# Patient Record
Sex: MALE | Race: WHITE | URBAN - METROPOLITAN AREA
[De-identification: names, ages, dates, MRNs, and addresses within clinical notes are randomized per-mention and may not be internally consistent; named-entity substitution may affect disease eponyms.]

---
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
## 2020-01-17 ENCOUNTER — EMERGENCY (EMERGENCY)
Facility: HOSPITAL | Age: 20
LOS: 0 days | Discharge: HOME | End: 2020-01-17
Attending: EMERGENCY MEDICINE | Admitting: EMERGENCY MEDICINE
Payer: COMMERCIAL

## 2020-01-17 VITALS
SYSTOLIC BLOOD PRESSURE: 144 MMHG | WEIGHT: 285.06 LBS | HEIGHT: 78 IN | DIASTOLIC BLOOD PRESSURE: 87 MMHG | HEART RATE: 75 BPM | OXYGEN SATURATION: 96 % | TEMPERATURE: 98 F | RESPIRATION RATE: 18 BRPM

## 2020-01-17 DIAGNOSIS — N50.811 RIGHT TESTICULAR PAIN: ICD-10-CM

## 2020-01-17 DIAGNOSIS — N34.2 OTHER URETHRITIS: ICD-10-CM

## 2020-01-17 DIAGNOSIS — N39.0 URINARY TRACT INFECTION, SITE NOT SPECIFIED: ICD-10-CM

## 2020-01-17 DIAGNOSIS — Z98.890 OTHER SPECIFIED POSTPROCEDURAL STATES: Chronic | ICD-10-CM

## 2020-01-17 DIAGNOSIS — N50.819 TESTICULAR PAIN, UNSPECIFIED: ICD-10-CM

## 2020-01-17 DIAGNOSIS — Z90.89 ACQUIRED ABSENCE OF OTHER ORGANS: Chronic | ICD-10-CM

## 2020-01-17 LAB
APPEARANCE UR: CLEAR — SIGNIFICANT CHANGE UP
BACTERIA # UR AUTO: ABNORMAL
BILIRUB UR-MCNC: NEGATIVE — SIGNIFICANT CHANGE UP
COD CRY URNS QL: NEGATIVE — SIGNIFICANT CHANGE UP
COLOR SPEC: YELLOW — SIGNIFICANT CHANGE UP
DIFF PNL FLD: NEGATIVE — SIGNIFICANT CHANGE UP
EPI CELLS # UR: ABNORMAL /HPF
GLUCOSE UR QL: NEGATIVE MG/DL — SIGNIFICANT CHANGE UP
GRAN CASTS # UR COMP ASSIST: NEGATIVE — SIGNIFICANT CHANGE UP
HYALINE CASTS # UR AUTO: NEGATIVE — SIGNIFICANT CHANGE UP
KETONES UR-MCNC: NEGATIVE — SIGNIFICANT CHANGE UP
LEUKOCYTE ESTERASE UR-ACNC: ABNORMAL
NITRITE UR-MCNC: NEGATIVE — SIGNIFICANT CHANGE UP
PH UR: 7 — SIGNIFICANT CHANGE UP (ref 5–8)
PROT UR-MCNC: NEGATIVE MG/DL — SIGNIFICANT CHANGE UP
RBC CASTS # UR COMP ASSIST: ABNORMAL /HPF
SP GR SPEC: 1.02 — SIGNIFICANT CHANGE UP (ref 1.01–1.03)
TRI-PHOS CRY UR QL COMP ASSIST: NEGATIVE — SIGNIFICANT CHANGE UP
URATE CRY FLD QL MICRO: NEGATIVE — SIGNIFICANT CHANGE UP
UROBILINOGEN FLD QL: 0.2 MG/DL — SIGNIFICANT CHANGE UP (ref 0.2–0.2)
WBC UR QL: ABNORMAL /HPF

## 2020-01-17 PROCEDURE — 99284 EMERGENCY DEPT VISIT MOD MDM: CPT

## 2020-01-17 PROCEDURE — 76870 US EXAM SCROTUM: CPT | Mod: 26

## 2020-01-17 RX ORDER — AZITHROMYCIN 500 MG/1
1000 TABLET, FILM COATED ORAL ONCE
Refills: 0 | Status: COMPLETED | OUTPATIENT
Start: 2020-01-17 | End: 2020-01-17

## 2020-01-17 RX ORDER — CEFTRIAXONE 500 MG/1
250 INJECTION, POWDER, FOR SOLUTION INTRAMUSCULAR; INTRAVENOUS ONCE
Refills: 0 | Status: COMPLETED | OUTPATIENT
Start: 2020-01-17 | End: 2020-01-17

## 2020-01-17 RX ORDER — NITROFURANTOIN MACROCRYSTAL 50 MG
1 CAPSULE ORAL
Qty: 10 | Refills: 0
Start: 2020-01-17 | End: 2020-01-21

## 2020-01-17 RX ADMIN — AZITHROMYCIN 1000 MILLIGRAM(S): 500 TABLET, FILM COATED ORAL at 21:29

## 2020-01-17 RX ADMIN — CEFTRIAXONE 250 MILLIGRAM(S): 500 INJECTION, POWDER, FOR SOLUTION INTRAMUSCULAR; INTRAVENOUS at 21:30

## 2020-01-17 NOTE — ED PROVIDER NOTE - PATIENT PORTAL LINK FT
You can access the FollowMyHealth Patient Portal offered by [facility] by registering at the following website: [email or web address]. By joining [employer]’s FollowMyHealth portal, you will also be able to view your health information using other applications (apps) compatible with our system.

## 2020-01-17 NOTE — ED PROVIDER NOTE - NS ED ROS FT
Constitutional: (-) fever  Eyes/ENT: (-) blurry vision, (-) epistaxis  Cardiovascular: (-) chest pain, (-) syncope  Respiratory: (-) cough, (-) shortness of breath  Gastrointestinal: (-) vomiting, (-) diarrhea  : R testicle pain (see HPI)  Musculoskeletal: (-) neck pain, (-) back pain, (-) joint pain  Integumentary: (-) rash, (-) edema  Neurological: (-) headache, (-) altered mental status  Psychiatric: (-) hallucinations  Allergic/Immunologic: (-) pruritus

## 2020-01-17 NOTE — ED PROVIDER NOTE - PHYSICAL EXAMINATION
Physical Exam    Vital Signs: I have reviewed the initial vital signs.  Constitutional: well-nourished, appears stated age, no acute distress  Eyes: Conjunctiva pink, Sclera clear, PERRLA, EOMI.  Gastrointestinal: soft, non-tender abdomen, no pulsatile mass, normal bowl sounds  : Minimal TTP over epididymis with no swelling, erythema or bruising. Pos cremasteric reflex.   Musculoskeletal: supple neck, no lower extremity edema, no midline tenderness  Integumentary: warm, dry, no rash  Neurologic: awake, alert, cranial nerves II-XII grossly intact, extremities’ motor and sensory functions grossly intact  Psychiatric: appropriate mood, appropriate affect

## 2020-01-17 NOTE — ED PROVIDER NOTE - CLINICAL SUMMARY MEDICAL DECISION MAKING FREE TEXT BOX
US unremarkable. UA is equivocal with WBC but no bacteria. Given this and isolated epidymal tenderness patient treated empirically for urethritis, GC/chlamydia sent, patient also to be treated for UTI.    Will follow up with urology.

## 2020-01-17 NOTE — ED ADULT TRIAGE NOTE - CHIEF COMPLAINT QUOTE
pt c/o pain to right testicle, started a few hours pta, denies pain with urination, denies swelling.

## 2020-01-17 NOTE — ED PROVIDER NOTE - ATTENDING CONTRIBUTION TO CARE
Healthy 18 yo M, here for assessment of 10 minute episode of R testicular pain which has resolved. Pain sharp, localized to posterior aspect of R testicle while urinating. No dysuria, hematuria, fever, chills, flank pain, abdominal pain, nausea, vomiting.     Denies penile discharge, sexually active with females, uses protection.     VS normal,    On exam has mild tenderness to R epidymis, normal appearing testicles, no scrotal swelling. Non tender abdomen, clear lungs, RRR.    Low suspicion for torsion, more suggestive of epidymitis. Will get UA, US and reassess

## 2020-01-17 NOTE — ED PROVIDER NOTE - NSFOLLOWUPINSTRUCTIONS_ED_ALL_ED_FT
Follow up with your primary medical doctor in 1-2 days as well as with the urologist that will be provided to you upon discharge     Hydrocele, Adult    A hydrocele is a collection of fluid in the loose pouch of skin that holds the testicles (scrotum). Usually, it affects only one testicle.     CAUSES  This condition may be caused by:    An injury to the scrotum.  An infection.  A tumor or cancer of the testicle.  Twisting of a testicle.  Decreased blood flow to the scrotum.    SYMPTOMS  A hydrocele feels like a water-filled balloon. It may also feel heavy. A hydrocele can cause:    Swelling of the scrotum. The swelling may decrease when you lie down.  Swelling of the groin.  Mild discomfort in the scrotum.  Pain. This can develop if the hydrocele was caused by infection or twisting.    DIAGNOSIS  This condition may be diagnosed with a medical history, physical exam, and imaging tests. You may also have blood and urine tests to check for infection.    TREATMENT  Treatment may include:    Watching and waiting, particularly if the hydrocele causes no symptoms.  Treatment of the underlying condition. This may include using antibiotic medicine.  Surgery to drain the fluid. Some surgical options include:  Needle aspiration. For this procedure, a needle is used to drain fluid.  Hydrocelectomy. For this procedure, an incision is made in the scrotum to remove the fluid sac.    HOME CARE INSTRUCTIONS  Keep all follow-up visits as told by your health care provider. This is important.  Watch the hydrocele for any changes.  Take over-the-counter and prescription medicines only as told by your health care provider.  If you were prescribed an antibiotic medicine, use it as told by your health care provider. Do not stop using the antibiotic even if your condition improves.    SEEK MEDICAL CARE IF:  The swelling in your scrotum or groin gets worse.  The hydrocele becomes red, firm, tender to the touch, or painful.  You notice any changes in the hydrocele.  You have a fever.    ADDITIONAL NOTES AND INSTRUCTIONS    Please follow up with your Primary MD in 24-48 hr.  Seek immediate medical care for any new/worsening signs or symptoms. Follow up with your primary medical doctor in 1-2 days as well as with the urologist that will be provided to you upon discharge       Testicle Pain    WHAT YOU NEED TO KNOW:    Testicle pain may start in your scrotum and spread to your abdomen. You may have sharp, sudden pain or dull pain that happens over time. Your testicle pain may come and go, or it may last for a long time. The cause of your pain may be unknown. Testicle pain can be caused by infection, trauma, hernia, kidney stones, or sexually transmitted infections (STIs). You may have a painful lump in your scrotum. The lump may be caused by an enlarged vein or fluid that collects around one of your testicles. This lump also may be caused by a more serious medical condition. Part of your testicle may twist. This is a serious condition that needs treatment as soon as possible.    DISCHARGE INSTRUCTIONS:    Medicines:     Antibiotics: This medicine helps fight or prevent infection. Take your antibiotics until they are gone, even if you feel better.      Pain medicine: You may be given a prescription medicine to decrease pain. Do not wait until the pain is severe before you take this medicine.      NSAIDs: These medicines decrease swelling, pain, and fever. NSAIDs are available without a doctor's order. Ask your healthcare provider which medicine is right for you. Ask how much to take and when to take it. Take as directed. NSAIDs can cause stomach bleeding and kidney problems if not taken correctly.      Take your medicine as directed. Contact your healthcare provider if you think your medicine is not helping or if you have side effects. Tell him or her if you are allergic to any medicine. Keep a list of the medicines, vitamins, and herbs you take. Include the amounts, and when and why you take them. Bring the list or the pill bottles to follow-up visits. Carry your medicine list with you in case of an emergency.    Decrease discomfort: With treatment, your pain may improve within 1 to 3 days. Depending on the cause of your testicle pain, your condition may take up to 4 weeks to heal.     Rest: Limit your activity until your pain decreases. Get more rest while you heal. Do not sit for long periods of time.       Cold packs: Place cold packs on your testicles to help ease your pain. Use cold packs as directed.      Elevation: Gently tuck a folded towel under your testicles to lift them as you sit in a chair or lie in bed. This will help ease your pain and decrease swelling.    Follow up with your healthcare provider or urologist in 3 to 7 days: Write down your questions so you remember to ask them during your visits.    Sexual activity: Avoid sexual activity until you have finished your antibiotics or until your healthcare provider tells you it is safe to have sex. Use condoms to lower your risk of STIs.    Contact your healthcare provider or urologist if:     You feel that your medicine or treatment is not working.      You feel more pain, tenderness, or swelling than before.      You have nausea or a low fever.      You have questions or concerns about your condition or care.    Return to the emergency department if:     You have sudden or severe pain in your testicles or abdomen.      You have pain in both testicles.      You are vomiting.      You have a high fever.      Your pain increases when you elevate your testicles.      Your scrotum turns blue. This could mean your testicle is not getting the blood flow it needs. Follow up with your primary medical doctor in 1-2 days as well as with the urologist that will be provided to you upon discharge       Testicle Pain    WHAT YOU NEED TO KNOW:    Testicle pain may start in your scrotum and spread to your abdomen. You may have sharp, sudden pain or dull pain that happens over time. Your testicle pain may come and go, or it may last for a long time. The cause of your pain may be unknown. Testicle pain can be caused by infection, trauma, hernia, kidney stones, or sexually transmitted infections (STIs). You may have a painful lump in your scrotum. The lump may be caused by an enlarged vein or fluid that collects around one of your testicles. This lump also may be caused by a more serious medical condition. Part of your testicle may twist. This is a serious condition that needs treatment as soon as possible.    Urinary Tract Infection    A urinary tract infection (UTI) is an infection of any part of the urinary tract, which includes the kidneys, ureters, bladder, and urethra. Risk factors include ignoring your need to urinate, wiping back to front if female, being an uncircumcised male, and having diabetes or a weak immune system. Symptoms include frequent urination, pain or burning with urination, foul smelling urine, cloudy urine, pain in the lower abdomen, blood in the urine, and fever. If you were prescribed an antibiotic medicine, take it as told by your health care provider. Do not stop taking the antibiotic even if you start to feel better.    SEEK IMMEDIATE MEDICAL CARE IF YOU HAVE THE FOLLOWING SYMPTOMS: severe back or abdominal pain, inability to keep fluids or medicine down, dizziness/lightheadedness, or a change in mental status.          DISCHARGE INSTRUCTIONS:    Medicines:     Antibiotics: This medicine helps fight or prevent infection. Take your antibiotics until they are gone, even if you feel better.      Pain medicine: You may be given a prescription medicine to decrease pain. Do not wait until the pain is severe before you take this medicine.      NSAIDs: These medicines decrease swelling, pain, and fever. NSAIDs are available without a doctor's order. Ask your healthcare provider which medicine is right for you. Ask how much to take and when to take it. Take as directed. NSAIDs can cause stomach bleeding and kidney problems if not taken correctly.      Take your medicine as directed. Contact your healthcare provider if you think your medicine is not helping or if you have side effects. Tell him or her if you are allergic to any medicine. Keep a list of the medicines, vitamins, and herbs you take. Include the amounts, and when and why you take them. Bring the list or the pill bottles to follow-up visits. Carry your medicine list with you in case of an emergency.    Decrease discomfort: With treatment, your pain may improve within 1 to 3 days. Depending on the cause of your testicle pain, your condition may take up to 4 weeks to heal.     Rest: Limit your activity until your pain decreases. Get more rest while you heal. Do not sit for long periods of time.       Cold packs: Place cold packs on your testicles to help ease your pain. Use cold packs as directed.      Elevation: Gently tuck a folded towel under your testicles to lift them as you sit in a chair or lie in bed. This will help ease your pain and decrease swelling.    Follow up with your healthcare provider or urologist in 3 to 7 days: Write down your questions so you remember to ask them during your visits.    Sexual activity: Avoid sexual activity until you have finished your antibiotics or until your healthcare provider tells you it is safe to have sex. Use condoms to lower your risk of STIs.    Contact your healthcare provider or urologist if:     You feel that your medicine or treatment is not working.      You feel more pain, tenderness, or swelling than before.      You have nausea or a low fever.      You have questions or concerns about your condition or care.    Return to the emergency department if:     You have sudden or severe pain in your testicles or abdomen.      You have pain in both testicles.      You are vomiting.      You have a high fever.      Your pain increases when you elevate your testicles.      Your scrotum turns blue. This could mean your testicle is not getting the blood flow it needs.

## 2020-01-17 NOTE — ED PROVIDER NOTE - CARE PROVIDER_API CALL
Jayant Wallace)  Urology  [address] [place] [address], [address]  [place] [address]  Phone: [phone]  Fax: [phone]  Follow Up Time: 1-3 Days

## 2020-01-17 NOTE — ED PROVIDER NOTE - CARE PLAN
Principal Discharge DX:	Hydrocele, bilateral Principal Discharge DX:	Testicular pain, right Principal Discharge DX:	Testicular pain, right  Secondary Diagnosis:	Urethritis  Secondary Diagnosis:	UTI (urinary tract infection)

## 2020-01-17 NOTE — ED PROVIDER NOTE - OBJECTIVE STATEMENT
Pt is a 19 year old male with no PMH presents to ED with testicular pain. Pt states approx.. 3 hours prior to arrival in ED developed pain to R testicle, first noticed while urinating. Pt states pain was originally aching, moderate in intensity, non radiating with no alleviating or aggravating factors. Pt states pain has since subsided and no just has a minimal aching sensation. Pt denies fever, chills. Denies abdominal pain, NVCD, dysuria, hematuria or clouding of urine.

## 2020-01-18 LAB
CULTURE RESULTS: SIGNIFICANT CHANGE UP
SPECIMEN SOURCE: SIGNIFICANT CHANGE UP

## 2020-01-20 LAB
C TRACH RRNA SPEC QL NAA+PROBE: SIGNIFICANT CHANGE UP
N GONORRHOEA RRNA SPEC QL NAA+PROBE: SIGNIFICANT CHANGE UP
SPECIMEN SOURCE: SIGNIFICANT CHANGE UP

## 2020-02-24 ENCOUNTER — EMERGENCY (EMERGENCY)
Facility: HOSPITAL | Age: 20
LOS: 0 days | Discharge: HOME | End: 2020-02-25
Attending: EMERGENCY MEDICINE | Admitting: EMERGENCY MEDICINE
Payer: COMMERCIAL

## 2020-02-24 DIAGNOSIS — Z98.890 OTHER SPECIFIED POSTPROCEDURAL STATES: Chronic | ICD-10-CM

## 2020-02-24 DIAGNOSIS — Z90.89 ACQUIRED ABSENCE OF OTHER ORGANS: Chronic | ICD-10-CM
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
## 2020-02-24 DIAGNOSIS — S09.90XA UNSPECIFIED INJURY OF HEAD, INITIAL ENCOUNTER: ICD-10-CM
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
## 2020-02-24 DIAGNOSIS — S06.0X0A CONCUSSION WITHOUT LOSS OF CONSCIOUSNESS, INITIAL ENCOUNTER: ICD-10-CM

## 2020-02-24 PROCEDURE — 99284 EMERGENCY DEPT VISIT MOD MDM: CPT

## 2020-02-25 VITALS
TEMPERATURE: 97 F | OXYGEN SATURATION: 98 % | WEIGHT: 288.81 LBS | HEART RATE: 76 BPM | RESPIRATION RATE: 17 BRPM | SYSTOLIC BLOOD PRESSURE: 130 MMHG | DIASTOLIC BLOOD PRESSURE: 68 MMHG

## 2020-02-25 RX ORDER — IBUPROFEN 200 MG
600 TABLET ORAL ONCE
Refills: 0 | Status: COMPLETED | OUTPATIENT
Start: 2020-02-25 | End: 2020-02-25

## 2020-02-25 RX ADMIN — Medication 600 MILLIGRAM(S): at 00:44

## 2020-02-25 RX ADMIN — Medication 600 MILLIGRAM(S): at 00:45

## 2020-02-25 NOTE — ED PROVIDER NOTE - CLINICAL SUMMARY MEDICAL DECISION MAKING FREE TEXT BOX
HA improving after motrin -- non focal neuro exam not suggestive of ICH, mass. Patient advised on cognitive rest, return precautions.

## 2020-02-25 NOTE — ED PROVIDER NOTE - PATIENT PORTAL LINK FT
You can access the FollowMyHealth Patient Portal offered by [facility] by registering at the following website: [email or web address]. By joining [employer]’s FollowMyHealth portal, you will also be able to view your health information using other applications (apps) compatible with our system.

## 2020-02-25 NOTE — ED PROVIDER NOTE - NS ED ROS FT
Constitutional: no fever, chills, no recent weight loss, change in appetite or malaise  Cardiac: No chest pain, SOB or edema.  Respiratory: No cough or respiratory distress  GI: No nausea, vomiting, diarrhea or abdominal pain.  : No dysuria, frequency, urgency or hematuria  MS: no pain to back or extremities, no loss of ROM  Neuro: see HPI  Skin: No skin rash.  Except as documented in the HPI, all other systems are negative.

## 2020-02-25 NOTE — ED PROVIDER NOTE - OBJECTIVE STATEMENT
18 yo M, history of anxiety, depression, chronic back pain, here for assessment of headache -- patient states he was looking for his car keys in his car, hit 18 yo M, history of anxiety, depression, chronic back pain, here for assessment of headache -- patient states he was looking for his car keys in his car, hit his head x 3 on the door frame. No LOC, nausea, vomiting. Did note that one time after lifting his head from bending over he was dizzy but those sx have not returned. However, has had mild throbbing frontal HA since hitting his head, no associated nausea.     No speech, vision or gait changes.     Not on any AC.

## 2020-02-25 NOTE — ED PROVIDER NOTE - CARE PROVIDER_API CALL
Shirley Ray (PhD)  [facility]  [address] [place] [address]  Phone: [phone]  Fax: [phone]  Follow Up Time:

## 2020-02-25 NOTE — ED PEDIATRIC NURSE NOTE - OBJECTIVE STATEMENT
Patient presents to ER in NAD AA&OX4 with Girlfriend. As per patient , he was looking for his keys when he banged his head on car door 3 times. Patient denies any loc, sob, c/p vomiting , diarrhea, or blood thinners, at this time. Patient states is nausea, dizziness at this time,

## 2020-02-25 NOTE — ED PEDIATRIC TRIAGE NOTE - CHIEF COMPLAINT QUOTE
Pt reports hitting head on car while looking for keys 3x. Pt reports nausea and dizziness at this time. Pt denies anticoagulation and denies loc.

## 2020-02-25 NOTE — ED PROVIDER NOTE - NSFOLLOWUPINSTRUCTIONS_ED_ALL_ED_FT
Concussion, Adult    A concussion is a brain injury from a direct hit (blow) to the head or body. This blow causes the brain to shake quickly back and forth inside the skull. This can damage brain cells and cause chemical changes in the brain. A concussion may also be known as a mild traumatic brain injury (TBI).    ImageConcussions are usually not life-threatening, but the effects of a concussion can be serious. If you have a concussion, you are more likely to experience concussion-like symptoms after a direct blow to the head in the future.    What are the causes?  This condition is caused by:    A direct blow to the head, such as from running into another player during a game, being hit in a fight, or hitting your head on a hard surface.  A jolt of the head or neck that causes the brain to move back and forth inside the skull, such as in a car crash.    What are the signs or symptoms?  The signs of a concussion can be hard to notice. Early on, they may be missed by you, family members, and health care providers. You may look fine but act or feel differently.    Symptoms are usually temporary, but they may last for days, weeks, or even longer. Some symptoms may appear right away but other symptoms may not show up for hours or days. Every head injury is different. Symptoms may include:    Headaches. This can include a feeling of pressure in the head.  Memory problems.  Trouble concentrating, organizing, or making decisions.  Slowness in thinking, acting or reacting, speaking, or reading.  Confusion.  Fatigue.  Changes in eating or sleeping patterns.  Problems with coordination or balance.  Nausea or vomiting.  Numbness or tingling.  Sensitivity to light or noise.  Vision or hearing problems.  Reduced sense of smell.  Irritability or mood changes.  Dizziness.  Lack of motivation.  Seeing or hearing things that other people do not see or hear (hallucinations).    How is this diagnosed?  This condition is diagnosed based on:    Your symptoms.  A description of your injury.    You may also have tests, including:    Imaging tests, such as a CT scan or MRI. These are done to look for signs of brain injury.  Neuropsychological tests. These measure your thinking, understanding, learning, and remembering abilities.    How is this treated?  This condition is treated with physical and mental rest and careful observation, usually at home. If the concussion is severe, you may need to stay home from work for a while. You may be referred to a concussion clinic or to other health care providers for management. It is important that you tell your health care provider if:    You are taking any medicines, including prescription medicines, over-the-counter medicines, and natural remedies. Some medicines, such as blood thinners (anticoagulants) and aspirin, may increase the chance of complications, such as bleeding.  You are taking or have taken alcohol or illegal drugs. Alcohol and certain other drugs may slow your recovery and can put you at risk of further injury.    How fast you will recover from a concussion depends on many factors, such as how severe your concussion is, what part of your brain was injured, how old you are, and how healthy you were before the concussion. Recovery can take time. It is important to wait to return to activity until a health care provider says it is safe to do that and your symptoms are completely gone.    Follow these instructions at home:  Activity     Limit activities that require a lot of thought or concentration. These may include:    Doing homework or job-related work.  Watching TV.  Working on the computer.  Playing memory games and puzzles.    Rest. Rest helps the brain to heal. Make sure you:    Get plenty of sleep at night. Avoid staying up late at night.  Keep the same bedtime hours on weekends and weekdays.  Rest during the day. Take naps or rest breaks when you feel tired.    Having another concussion before the first one has healed can be dangerous. Do not do high-risk activities that could cause a second concussion, such as riding a bicycle or playing sports.  Ask your health care provider when you can return to your normal activities, such as school, work, athletics, driving, riding a bicycle, or using heavy machinery. Your ability to react may be slower after a brain injury. Never do these activities if you are dizzy. Your health care provider will likely give you a plan for gradually returning to activities.  General instructions     Take over-the-counter and prescription medicines only as told by your health care provider.  Do not drink alcohol until your health care provider says you can.  If it is harder than usual to remember things, write them down.  If you are easily distracted, try to do one thing at a time. For example, do not try to watch TV while fixing dinner.  Talk with family members or close friends when making important decisions.  Watch your symptoms and tell others to do the same. Complications sometimes occur after a concussion. Older adults with a brain injury may have a higher risk of serious complications, such as a blood clot in the brain.  Tell your teachers, school nurse, school counselor, , , or  about your injury, symptoms, and restrictions. Tell them about what you can or cannot do. They should watch for:    Increased problems with attention or concentration.  Increased difficulty remembering or learning new information.  Increased time needed to complete tasks or assignments.  Increased irritability or decreased ability to cope with stress.  Increased symptoms.    Keep all follow-up visits as told by your health care provider. This is important.  How is this prevented?  It is very important to avoid another brain injury, especially as you recover. In rare cases, another injury can lead to permanent brain damage, brain swelling, or death. The risk of this is greatest during the first 7–10 days after a head injury. Avoid injuries by:    Wearing a seat belt when riding in a car.  Wearing a helmet when biking, skiing, skateboarding, skating, or doing similar activities.  Avoiding activities that could lead to a second concussion, such as contact or recreational sports, until your health care provider says it is okay.  Taking safety measures in your home, such as:    Removing clutter and tripping hazards from floors and stairways.  Using grab bars in bathrooms and handrails by stairs.  Placing non-slip mats on floors and in bathtubs.  Improving lighting in dim areas.      Contact a health care provider if:  Your symptoms get worse.  You have new symptoms.  You continue to have symptoms for more than 2 weeks.  Get help right away if:  You have severe or worsening headaches.  You have weakness or numbness in any part of your body.  Your coordination gets worse.  You vomit repeatedly.  You are sleepier.  The pupil of one eye is larger than the other.  You have convulsions or a seizure.  Your speech is slurred.  Your fatigue, confusion, or irritability gets worse.  You cannot recognize people or places.  You have neck pain.  It is difficult to wake you up.  You have unusual behavior changes.  You lose consciousness.  Summary  A concussion is a brain injury from a direct hit (blow) to the head or body.  A concussion may also be called a mild traumatic brain injury (TBI).  You may have imaging tests and neuropsychological tests to diagnose a concussion.  This condition is treated with physical and mental rest and careful observation.  Ask your health care provider when you can return to your normal activities, such as school, work, athletics, driving, riding a bicycle, or using heavy machinery. Follow safety instructions as told by your health care provider.  This information is not intended to replace advice given to you by your health care provider. Make sure you discuss any questions you have with your health care provider.

## 2020-02-25 NOTE — ED PEDIATRIC TRIAGE NOTE - TEMP(CELSIUS)
Chief complaint: Physical exam    69-year-old black female  whose PCP is retired.  Regarding health maintenance she is up-to-date on mammogram 12/17.  It looks like she had colon polyps in 2013 with a 5 year follow-up most likely recommended.  She did quit smoking in his care and about 10 pounds over the last 6 months.  Discussed calorie intake and expenditure as well as means to reduce craving    ROS:   CONST: weight stable. EYES: no vision change. ENT: no sore throat. CV: no chest pain w/ exertion. RESP: no shortness of breath. GI: no nausea, vomiting, diarrhea. No dysphagia. : no urinary issues. MUSCULOSKELETAL: no new myalgias or arthralgias. SKIN: no new changes. NEURO: no focal deficits. PSYCH: no new issues. ENDOCRINE: no polyuria. HEME: no lymph nodes. ALLERGY: no general pruritis.    Past Medical History   Diagnosis Date    Cataracts, bilateral     CKD (chronic kidney disease) stage 3, GFR 30-59 ml/min 12/15/2014    Combined hyperlipidemia associated with type 2 diabetes mellitus 6/7/2013    COPD (chronic obstructive pulmonary disease) 12/15/2014    Diabetes mellitus type II----with chronic kidney disease           Diabetes mellitus with renal manifestations, uncontrolled 2/1/2017    Diabetic retinopathy     Family history of stomach cancer 12/5/2013    H/O renal cell carcinoma 12/15/2015    History of colonic polyps 2/1/2017     3 polyps 7/13 ---5 yrs    History of gastroesophageal reflux (GERD)     History of urinary incontinence      s/p bladder lift-october, 2011 (at [facility])    Hyperlipidemia     Hypertension      120s/70s-80s    Nephrolithiasis     Osteoporosis, post-menopausal, evaluated by Dr. York, quit Fosamax due to CKD early 2017  3/17/2014    Primary hyperparathyroidism 10/20/2016    Schatzki's ring 2/1/2017     Dilated 2014   Prevnar 2017    Past Surgical History:   Procedure Laterality Date    bladder lift  2011    done at [facility]    BLADDER STONE REMOVAL  2011     before bladder lift    CATARACT EXTRACTION W/  INTRAOCULAR LENS IMPLANT Left 06/07/2016    Dr. Vásquez    CATARACT EXTRACTION W/  INTRAOCULAR LENS IMPLANT Right 06/21/2016    Dr. Vásquez    CYSTOSCOPY      NEPHRECTOMY      partial right     Social History     Social History    Marital status:      Spouse name: N/A    Number of children: N/A    Years of education: N/A     Occupational History    retired [employer]      Social History Main Topics    Smoking status: Current Every Day Smoker     Packs/day: 0.25     Years: 30.00     Types: Cigarettes    Smokeless tobacco: Never Used      Comment: 4-5 cigs/day    Alcohol use No    Drug use: Unknown    Sexual activity: Not on file     Other Topics Concern    Not on file     Social History Narrative    Lives on [facility]    Here with sister aKte [phone]             family history includes Cataracts in her mother; Colon cancer in her brother; Glaucoma in her mother; Nephrolithiasis in her sister; No Known Problems in her father, maternal aunt, maternal grandfather, maternal grandmother, maternal uncle, paternal aunt, paternal grandfather, paternal grandmother, and paternal uncle.     Gen: no distress  EYES: conjunctiva clear, non-icteric, PERRL  ENT: nose clear, nasal mucosa normal, oropharynx clear and moist, teeth good  NECK:supple, thyroid non-palpable  RESP: effort is good, lungs clear  CV: heart RRR w/o murmur, gallops or rubs; no carotid bruits, no edema at all today even at the ankles  GI: abdomen soft, non-distended, non-tender, no hepatosplenomegaly  MS: gait normal, no clubbing or cyanosis of the digits  SKIN: no rashes, warm to touch    Sarina was seen today for establish care.    Diagnoses and all orders for this visit:    Routine medical exam, new to me, up-to-date on mammogram and due for colonoscopy, continue smoking cessation                          Additional evaluation and management issues:    Additionally, patient  has numerous other medical issues to address today.  She has hypertension which appears to be under good control and needs refills of her medication.  She does get some edema at the end of the day that is gone in the morning and I reassured her about that but she is concerned and on Norvasc 5 mg.  Blood pressure is excellent and we will discontinue and have her monitor.  She could have hurt Diovan increased if needed.  She has diabetes which is still uncontrolled and apparently was seeing endocrine but now will be seeing me.  Her A1c is 7.3.  She does have chronic renal insufficiency and follows with nephrology.  I explained her chronic kidney disease.  Her GFR is actually better.  She is on Lantus 40 units and needs to switch to Levemir due to insurance.  We will reassess on her current medications.  She has a lot of reflux lately.  She's been taking some fish all.  Her reflux is worse at night despite use of Zantac.  We discussed reflux precautions at great length and also will try omeprazole 40 mg.  She apparently was given Protonix in the past and had sugars go over 500 and I reassured them I cannot explain how that would have worsened her sugar would apparently might of been a listed side effect and her family member present insists it was probably that medicine.  They will watch for any sugar related issues with the start of omeprazole but also reassess diet which may have changed with prior reflux issues.  She apparently is primary hyperparathyroidism and a history of hypercalcemia but all recent labs appear to be normal as well as PTH.  Vitamin D level also normal.  She apparently is on a weekly medication for many can for osteoporosis.  She has a history of COPD but no active symptoms lately.  She has hyperlipidemia and may been on Lipitor in the past with an unremembered intolerance.  We discussed benefits of statin and her LDL has responded  .  All these issues reviewed and patient counseled an evaluation  and management of all the separate issues we based upon time counselingTotal time over 25 minutes with over 50% counseling.            Assessment and plan:      Essential hypertension, chronic and stable but she is concerned about the edema which I reassured her about the we will discontinue Norvasc 5 mg    Uncontrolled type 2 diabetes mellitus with stage 3 chronic kidney disease, with long-term current use of insulin, may need to increase insulin, change insulin to Levemir, discussed low sugars with glipizide etc.    CKD (chronic kidney disease) stage 3, GFR 30-59 ml/min, followed by nephrology but improving    Hyperlipidemia, unspecified hyperlipidemia type, better LDL    Long-term insulin use    Anemia, unspecified type, chronic and stable    Primary hyperparathyroidism, followed by nephrology    History of colonic polyps, due for 5 year follow-up  -     Case request GI: COLONOSCOPY    Gastroesophageal reflux disease, esophagitis presence not specified, chronic and stable    Bilateral carotid artery stenosis    PVD (peripheral vascular disease) with claudication, reviewed ABIs which have improved with aspirin and smoking cessation apparently    Tobacco use disorder    Chronic obstructive pulmonary disease, unspecified COPD type    Lumbar arthropathy    DM type 2 without retinopathy    Osteoporosis, post-menopausal, due for two-year follow-up and she has been off Fosamax for about 1 year, may be a candidate for Prolia may refer back to Dr. York after bone density  -     DXA Bone Density Spine And Hip; Future    Other orders  -     insulin detemir U-100 (LEVEMIR FLEXTOUCH) 100 unit/mL (3 mL) SubQ InPn pen; Inject 50 Units into the skin every evening.        36.3

## 2020-09-16 ENCOUNTER — OUTPATIENT (OUTPATIENT)
Dept: OUTPATIENT SERVICES | Facility: HOSPITAL | Age: 20
LOS: 1 days | Discharge: HOME | End: 2020-09-16

## 2020-09-16 VITALS
TEMPERATURE: 97 F | RESPIRATION RATE: 16 BRPM | OXYGEN SATURATION: 98 % | HEART RATE: 78 BPM | WEIGHT: 315 LBS | SYSTOLIC BLOOD PRESSURE: 141 MMHG | HEIGHT: 78 IN | DIASTOLIC BLOOD PRESSURE: 80 MMHG

## 2020-09-16 DIAGNOSIS — Z90.89 ACQUIRED ABSENCE OF OTHER ORGANS: Chronic | ICD-10-CM

## 2020-09-16 DIAGNOSIS — M43.17 SPONDYLOLISTHESIS, LUMBOSACRAL REGION: ICD-10-CM

## 2020-09-16 DIAGNOSIS — Z01.818 ENCOUNTER FOR OTHER PREPROCEDURAL EXAMINATION: ICD-10-CM

## 2020-09-16 DIAGNOSIS — Z98.890 OTHER SPECIFIED POSTPROCEDURAL STATES: Chronic | ICD-10-CM

## 2020-09-16 LAB
ALBUMIN SERPL ELPH-MCNC: 4.5 G/DL — SIGNIFICANT CHANGE UP (ref 3.5–5.2)
ALP SERPL-CCNC: 60 U/L — SIGNIFICANT CHANGE UP (ref 30–115)
ALT FLD-CCNC: 34 U/L — SIGNIFICANT CHANGE UP (ref 13–38)
ANION GAP SERPL CALC-SCNC: 15 MMOL/L — HIGH (ref 7–14)
APPEARANCE UR: CLEAR — SIGNIFICANT CHANGE UP
APTT BLD: 31.4 SEC — SIGNIFICANT CHANGE UP (ref 27–39.2)
AST SERPL-CCNC: 24 U/L — SIGNIFICANT CHANGE UP (ref 13–38)
BACTERIA # UR AUTO: NEGATIVE — SIGNIFICANT CHANGE UP
BASOPHILS # BLD AUTO: 0.03 K/UL — SIGNIFICANT CHANGE UP (ref 0–0.2)
BASOPHILS NFR BLD AUTO: 0.3 % — SIGNIFICANT CHANGE UP (ref 0–1)
BILIRUB SERPL-MCNC: 0.5 MG/DL — SIGNIFICANT CHANGE UP (ref 0.2–1.2)
BILIRUB UR-MCNC: NEGATIVE — SIGNIFICANT CHANGE UP
BLD GP AB SCN SERPL QL: SIGNIFICANT CHANGE UP
BUN SERPL-MCNC: 20 MG/DL — SIGNIFICANT CHANGE UP (ref 10–20)
CALCIUM SERPL-MCNC: 9.3 MG/DL — SIGNIFICANT CHANGE UP (ref 8.5–10.1)
CHLORIDE SERPL-SCNC: 104 MMOL/L — SIGNIFICANT CHANGE UP (ref 98–110)
CO2 SERPL-SCNC: 23 MMOL/L — SIGNIFICANT CHANGE UP (ref 17–32)
COLOR SPEC: YELLOW — SIGNIFICANT CHANGE UP
CREAT SERPL-MCNC: 0.9 MG/DL — SIGNIFICANT CHANGE UP (ref 0.3–1)
DIFF PNL FLD: NEGATIVE — SIGNIFICANT CHANGE UP
EOSINOPHIL # BLD AUTO: 0.11 K/UL — SIGNIFICANT CHANGE UP (ref 0–0.7)
EOSINOPHIL NFR BLD AUTO: 1.2 % — SIGNIFICANT CHANGE UP (ref 0–8)
EPI CELLS # UR: 2 /HPF — SIGNIFICANT CHANGE UP (ref 0–5)
GLUCOSE SERPL-MCNC: 74 MG/DL — SIGNIFICANT CHANGE UP (ref 70–99)
GLUCOSE UR QL: NEGATIVE — SIGNIFICANT CHANGE UP
HCT VFR BLD CALC: 48.5 % — SIGNIFICANT CHANGE UP (ref 42–52)
HGB BLD-MCNC: 15.9 G/DL — SIGNIFICANT CHANGE UP (ref 14–18)
HYALINE CASTS # UR AUTO: 1 /LPF — SIGNIFICANT CHANGE UP (ref 0–7)
IMM GRANULOCYTES NFR BLD AUTO: 0.3 % — SIGNIFICANT CHANGE UP (ref 0.1–0.3)
INR BLD: 1 RATIO — SIGNIFICANT CHANGE UP (ref 0.65–1.3)
KETONES UR-MCNC: NEGATIVE — SIGNIFICANT CHANGE UP
LEUKOCYTE ESTERASE UR-ACNC: ABNORMAL
LYMPHOCYTES # BLD AUTO: 4.48 K/UL — HIGH (ref 1.2–3.4)
LYMPHOCYTES # BLD AUTO: 48 % — SIGNIFICANT CHANGE UP (ref 20.5–51.1)
MCHC RBC-ENTMCNC: 29.4 PG — SIGNIFICANT CHANGE UP (ref 27–31)
MCHC RBC-ENTMCNC: 32.8 G/DL — SIGNIFICANT CHANGE UP (ref 32–37)
MCV RBC AUTO: 89.8 FL — SIGNIFICANT CHANGE UP (ref 80–94)
MONOCYTES # BLD AUTO: 1.08 K/UL — HIGH (ref 0.1–0.6)
MONOCYTES NFR BLD AUTO: 11.6 % — HIGH (ref 1.7–9.3)
NEUTROPHILS # BLD AUTO: 3.6 K/UL — SIGNIFICANT CHANGE UP (ref 1.4–6.5)
NEUTROPHILS NFR BLD AUTO: 38.6 % — LOW (ref 42.2–75.2)
NITRITE UR-MCNC: NEGATIVE — SIGNIFICANT CHANGE UP
NRBC # BLD: 0 /100 WBCS — SIGNIFICANT CHANGE UP (ref 0–0)
PH UR: 6 — SIGNIFICANT CHANGE UP (ref 5–8)
PLATELET # BLD AUTO: 247 K/UL — SIGNIFICANT CHANGE UP (ref 130–400)
POTASSIUM SERPL-MCNC: 4.4 MMOL/L — SIGNIFICANT CHANGE UP (ref 3.5–5)
POTASSIUM SERPL-SCNC: 4.4 MMOL/L — SIGNIFICANT CHANGE UP (ref 3.5–5)
PROT SERPL-MCNC: 6.7 G/DL — SIGNIFICANT CHANGE UP (ref 6.1–8)
PROT UR-MCNC: SIGNIFICANT CHANGE UP
PROTHROM AB SERPL-ACNC: 11.5 SEC — SIGNIFICANT CHANGE UP (ref 9.95–12.87)
RBC # BLD: 5.4 M/UL — SIGNIFICANT CHANGE UP (ref 4.7–6.1)
RBC # FLD: 12.6 % — SIGNIFICANT CHANGE UP (ref 11.5–14.5)
RBC CASTS # UR COMP ASSIST: 2 /HPF — SIGNIFICANT CHANGE UP (ref 0–4)
SODIUM SERPL-SCNC: 142 MMOL/L — SIGNIFICANT CHANGE UP (ref 135–146)
SP GR SPEC: 1.03 — HIGH (ref 1.01–1.03)
UROBILINOGEN FLD QL: SIGNIFICANT CHANGE UP
WBC # BLD: 9.33 K/UL — SIGNIFICANT CHANGE UP (ref 4.8–10.8)
WBC # FLD AUTO: 9.33 K/UL — SIGNIFICANT CHANGE UP (ref 4.8–10.8)
WBC UR QL: 17 /HPF — HIGH (ref 0–5)

## 2020-09-16 NOTE — H&P PST ADULT - REASON FOR ADMISSION
20 yo male presents for PAST in preparation for lumbar five sacral one transforaminal lumbar interbody fusion with bilateral pedicle screw fixation and fusion on 9/25/2020.

## 2020-09-16 NOTE — H&P PST ADULT - HISTORY OF PRESENT ILLNESS
Pt complains of back pain caused from years of wear and tear from sports. Denies any chest pain, difficulty breathing, SOB, palpitations, dysuria, URI, or any other infections in the last 2 weeks. Denies any recent travel, contact, or exposure to any persons with known or suspected COVID-19. Pt also denies COVID testing within the last 2 weeks. Pt advised to self quarantine until day of procedure. Exercise tolerance of 1-2 flights of stairs without dyspnea. YUMIKO reviewed with patient.    Anesthesia Alert  NO--Difficult Airway  NO--History of neck surgery or radiation  NO--Limited ROM of neck  NO--History of Malignant hyperthermia  NO--No personal or family history of Pseudocholinesterase deficiency.  NO--Prior Anesthesia Complication  NO--Latex Allergy  NO--Loose teeth  NO--History of Rheumatoid Arthritis  NO--YUMIKO  NO--Other_____   Pt complains of back pain caused from years of wear and tear from sports. Denies any chest pain, difficulty breathing, SOB, palpitations, dysuria, URI, or any other infections in the last 2 weeks. Denies any recent travel, contact, or exposure to any persons with known or suspected COVID-19. Pt also denies COVID testing within the last 2 weeks. Pt advised to self quarantine until day of procedure. Exercise tolerance of 1-2 flights of stairs without dyspnea. YUMIKO reviewed with patient.    Anesthesia Alert  NO--Difficult Airway  NO--History of neck surgery or radiation  NO--Limited ROM of neck  NO--History of Malignant hyperthermia  NO--No personal or family history of Pseudocholinesterase deficiency.  YES--Prior Anesthesia Complication: PONV  NO--Latex Allergy  NO--Loose teeth  NO--History of Rheumatoid Arthritis  NO--YUMIKO  NO--Other_____

## 2020-09-16 NOTE — H&P PST ADULT - MEDICATION ADMINISTRATION INFO, PROFILE
Good FM, no LOF, Ctx, VB.   Wants paraguard placed at time of delivery, will order today. Does not think she wants to BF. Precautions given. RCS scheduled 7/27.     
no concerns

## 2020-09-22 ENCOUNTER — OUTPATIENT (OUTPATIENT)
Dept: OUTPATIENT SERVICES | Facility: HOSPITAL | Age: 20
LOS: 1 days | Discharge: HOME | End: 2020-09-22

## 2020-09-22 DIAGNOSIS — Z11.59 ENCOUNTER FOR SCREENING FOR OTHER VIRAL DISEASES: ICD-10-CM

## 2020-09-22 DIAGNOSIS — Z98.890 OTHER SPECIFIED POSTPROCEDURAL STATES: Chronic | ICD-10-CM

## 2020-09-22 DIAGNOSIS — Z90.89 ACQUIRED ABSENCE OF OTHER ORGANS: Chronic | ICD-10-CM

## 2020-09-22 PROBLEM — F41.9 ANXIETY DISORDER, UNSPECIFIED: Chronic | Status: ACTIVE | Noted: 2020-09-16

## 2020-09-25 ENCOUNTER — INPATIENT (INPATIENT)
Facility: HOSPITAL | Age: 20
LOS: 2 days | Discharge: HOME | End: 2020-09-28
Attending: NEUROLOGICAL SURGERY | Admitting: NEUROLOGICAL SURGERY
Payer: COMMERCIAL

## 2020-09-25 ENCOUNTER — RESULT REVIEW (OUTPATIENT)
Age: 20
End: 2020-09-25

## 2020-09-25 VITALS
TEMPERATURE: 99 F | SYSTOLIC BLOOD PRESSURE: 166 MMHG | HEART RATE: 88 BPM | DIASTOLIC BLOOD PRESSURE: 80 MMHG | HEIGHT: 78 IN | RESPIRATION RATE: 20 BRPM | WEIGHT: 315 LBS

## 2020-09-25 DIAGNOSIS — Z98.890 OTHER SPECIFIED POSTPROCEDURAL STATES: Chronic | ICD-10-CM

## 2020-09-25 DIAGNOSIS — Z90.89 ACQUIRED ABSENCE OF OTHER ORGANS: Chronic | ICD-10-CM

## 2020-09-25 LAB — ABO RH CONFIRMATION: SIGNIFICANT CHANGE UP

## 2020-09-25 PROCEDURE — 88304 TISSUE EXAM BY PATHOLOGIST: CPT | Mod: 26

## 2020-09-25 PROCEDURE — 88311 DECALCIFY TISSUE: CPT | Mod: 26

## 2020-09-25 RX ORDER — ONDANSETRON 8 MG/1
4 TABLET, FILM COATED ORAL EVERY 6 HOURS
Refills: 0 | Status: DISCONTINUED | OUTPATIENT
Start: 2020-09-25 | End: 2020-09-28

## 2020-09-25 RX ORDER — ACETAMINOPHEN 500 MG
650 TABLET ORAL EVERY 6 HOURS
Refills: 0 | Status: DISCONTINUED | OUTPATIENT
Start: 2020-09-25 | End: 2020-09-28

## 2020-09-25 RX ORDER — CEFAZOLIN SODIUM 1 G
1000 VIAL (EA) INJECTION EVERY 8 HOURS
Refills: 0 | Status: COMPLETED | OUTPATIENT
Start: 2020-09-25 | End: 2020-09-26

## 2020-09-25 RX ORDER — SODIUM CHLORIDE 9 MG/ML
1000 INJECTION INTRAMUSCULAR; INTRAVENOUS; SUBCUTANEOUS
Refills: 0 | Status: DISCONTINUED | OUTPATIENT
Start: 2020-09-25 | End: 2020-09-28

## 2020-09-25 RX ORDER — SENNA PLUS 8.6 MG/1
2 TABLET ORAL AT BEDTIME
Refills: 0 | Status: DISCONTINUED | OUTPATIENT
Start: 2020-09-25 | End: 2020-09-28

## 2020-09-25 RX ORDER — HYDROMORPHONE HYDROCHLORIDE 2 MG/ML
1 INJECTION INTRAMUSCULAR; INTRAVENOUS; SUBCUTANEOUS
Refills: 0 | Status: DISCONTINUED | OUTPATIENT
Start: 2020-09-25 | End: 2020-09-25

## 2020-09-25 RX ORDER — HEPARIN SODIUM 5000 [USP'U]/ML
5000 INJECTION INTRAVENOUS; SUBCUTANEOUS EVERY 8 HOURS
Refills: 0 | Status: DISCONTINUED | OUTPATIENT
Start: 2020-09-26 | End: 2020-09-28

## 2020-09-25 RX ORDER — INFLUENZA VIRUS VACCINE 15; 15; 15; 15 UG/.5ML; UG/.5ML; UG/.5ML; UG/.5ML
0.5 SUSPENSION INTRAMUSCULAR ONCE
Refills: 0 | Status: COMPLETED | OUTPATIENT
Start: 2020-09-25 | End: 2020-09-25

## 2020-09-25 RX ORDER — CLONAZEPAM 1 MG
0.5 TABLET ORAL EVERY 12 HOURS
Refills: 0 | Status: DISCONTINUED | OUTPATIENT
Start: 2020-09-25 | End: 2020-09-28

## 2020-09-25 RX ORDER — HYDROMORPHONE HYDROCHLORIDE 2 MG/ML
30 INJECTION INTRAMUSCULAR; INTRAVENOUS; SUBCUTANEOUS
Refills: 0 | Status: DISCONTINUED | OUTPATIENT
Start: 2020-09-25 | End: 2020-09-26

## 2020-09-25 RX ORDER — ONDANSETRON 8 MG/1
4 TABLET, FILM COATED ORAL ONCE
Refills: 0 | Status: DISCONTINUED | OUTPATIENT
Start: 2020-09-25 | End: 2020-09-25

## 2020-09-25 RX ORDER — PANTOPRAZOLE SODIUM 20 MG/1
40 TABLET, DELAYED RELEASE ORAL
Refills: 0 | Status: DISCONTINUED | OUTPATIENT
Start: 2020-09-25 | End: 2020-09-28

## 2020-09-25 RX ORDER — METHOCARBAMOL 500 MG/1
750 TABLET, FILM COATED ORAL EVERY 6 HOURS
Refills: 0 | Status: DISCONTINUED | OUTPATIENT
Start: 2020-09-25 | End: 2020-09-28

## 2020-09-25 RX ORDER — SODIUM CHLORIDE 9 MG/ML
1000 INJECTION, SOLUTION INTRAVENOUS
Refills: 0 | Status: DISCONTINUED | OUTPATIENT
Start: 2020-09-25 | End: 2020-09-25

## 2020-09-25 RX ADMIN — HYDROMORPHONE HYDROCHLORIDE 1 MILLIGRAM(S): 2 INJECTION INTRAMUSCULAR; INTRAVENOUS; SUBCUTANEOUS at 17:45

## 2020-09-25 RX ADMIN — METHOCARBAMOL 750 MILLIGRAM(S): 500 TABLET, FILM COATED ORAL at 23:08

## 2020-09-25 RX ADMIN — HYDROMORPHONE HYDROCHLORIDE 30 MILLILITER(S): 2 INJECTION INTRAMUSCULAR; INTRAVENOUS; SUBCUTANEOUS at 18:25

## 2020-09-25 RX ADMIN — METHOCARBAMOL 750 MILLIGRAM(S): 500 TABLET, FILM COATED ORAL at 19:03

## 2020-09-25 RX ADMIN — SODIUM CHLORIDE 100 MILLILITER(S): 9 INJECTION INTRAMUSCULAR; INTRAVENOUS; SUBCUTANEOUS at 19:00

## 2020-09-25 RX ADMIN — HYDROMORPHONE HYDROCHLORIDE 1 MILLIGRAM(S): 2 INJECTION INTRAMUSCULAR; INTRAVENOUS; SUBCUTANEOUS at 17:40

## 2020-09-25 RX ADMIN — HYDROMORPHONE HYDROCHLORIDE 1 MILLIGRAM(S): 2 INJECTION INTRAMUSCULAR; INTRAVENOUS; SUBCUTANEOUS at 18:15

## 2020-09-25 RX ADMIN — HYDROMORPHONE HYDROCHLORIDE 1 MILLIGRAM(S): 2 INJECTION INTRAMUSCULAR; INTRAVENOUS; SUBCUTANEOUS at 17:16

## 2020-09-25 RX ADMIN — Medication 100 MILLIGRAM(S): at 22:35

## 2020-09-25 NOTE — PROGRESS NOTE ADULT - SUBJECTIVE AND OBJECTIVE BOX
NEUROSURGERY POST OP NOTE:    POD# 0 S/P L5-S1 TLIF with stabilization from L4-S1    S: no headache      T(C): 36.5 (09-25-20 @ 17:09), Max: 37.1 (09-25-20 @ 06:37)  HR: 74 (09-25-20 @ 18:30) (74 - 88)  BP: 139/82 (09-25-20 @ 18:30) (138/70 - 166/80)  RR: 25 (09-25-20 @ 18:30) (19 - 25)  SpO2: 95% (09-25-20 @ 18:30) (93% - 95%)      09-25-20 @ 07:01  -  09-25-20 @ 18:59  --------------------------------------------------------  IN: 125 mL / OUT: 40 mL / NET: 85 mL        acetaminophen   Tablet .. 650 milliGRAM(s) Oral every 6 hours PRN  bisacodyl 5 milliGRAM(s) Oral daily PRN  ceFAZolin   IVPB 1000 milliGRAM(s) IV Intermittent every 8 hours  clonazePAM  Tablet 0.5 milliGRAM(s) Oral every 12 hours PRN  HYDROmorphone  Injectable 1 milliGRAM(s) IV Push every 10 minutes PRN  HYDROmorphone PCA (1 mG/mL) 30 milliLiter(s) PCA Continuous PCA Continuous  lactated ringers. 1000 milliLiter(s) IV Continuous <Continuous>  methocarbamol 750 milliGRAM(s) Oral every 6 hours  ondansetron Injectable 4 milliGRAM(s) IV Push every 6 hours PRN  ondansetron Injectable 4 milliGRAM(s) IV Push once PRN  pantoprazole    Tablet 40 milliGRAM(s) Oral before breakfast  senna 2 Tablet(s) Oral at bedtime PRN  sodium chloride 0.9%. 1000 milliLiter(s) IV Continuous <Continuous>      RADIOLOGY:     Exam: awake, alert x3, NAD, no leg pain, mild back pain, MUÑOZ with good strength, no complaints    WOUND/DRAINS: dressing dry        Assessment: 19yMale Post op, doing well      Plan: has a brace at home, family to bring in, dariusz escobedon for anxiety, lay flat in bed, HOB elevated no more than 30 degrees, will reevaluate in am and possibly start PT

## 2020-09-25 NOTE — ASU PREOP CHECKLIST - BMI (KG/M2)
Adult Mental Health Intensive Outpatient Program  Interdisciplinary Treatment Plan    Letitia Davis  [id number]   :1967    3/14/2018    Plan Type: Discharge Plan    Your patient, Letitia Davis was seen in the Adult Mental Health Intensive Outpatient Program.  Please see below for the patient agreed upon plan, including Goal(s), Focus Indicators, Outcomes and Interventions. Goals:  Patient Reported Goal #1: Billie Espinoza will learn 2+ coping skills by the sixth session to help him manage his anger. Goal #2: Billie Espinoza will improve his symptom management with an increased ability to function within his daily living environment.                    Goal Type:  Pt Reported Goal #1 Type: Short Term Goal  Goal #2 Type: Long Term Goal    Goal Progression:  Goal #1 Progression: Outcome Not Met At Discharge (Non-Nursing Only)  Goal #2 Progression: Outcome Not Met At Discharge (Non-Nursing Only)                Focus Indicators:  Anxiety, Restlessness, Poor Memory/Poor Concentration, Indecision, Worry, Obsessive Thinking, Fear, Paranoia, Irritability, Anger, Insecurity, Negative Thoughts, Mood Swings, Sad/Tearful, Decreased Energy, Sleep Problems, Physical Concerns, Physical Pain, Suicidal Ideation    Outcomes:  Patient will attend and participate in therapeutic groups, Patient will collaborate with treatment team in planning continuing care, Patient will demonstrate and/or report use of distress tolerance skills, Patient will demonstrate and/or report use of reality acceptance skills, Patient will demonstrate and/or report use of mindfulness skills, Patient will develop and practice 2 new coping skills to manage symtoms, Patient will participate in developing a Crisis Survival Plan    Interventions:  Teach distress tolerance skills, Teach reality acceptance skills, Teach mindfulness skills, Assist patient to identify manageable tasks as a way to goal achievement, Educate patient on goal setting, Encourage group attendance to learn and reinforce positive coping strategies, Offer opportunity to report challenges and successes of using new skills, Process source and effect of emotions on patient situation, Provide patient with written material for community support, recreation, and/or leisure activities    Patient progress towards goals: Discharge [address], LPC-IT 38.1

## 2020-09-25 NOTE — BRIEF OPERATIVE NOTE - NSICDXBRIEFPROCEDURE_GEN_ALL_CORE_FT
PROCEDURES:  Fusion, spine, lumbar, TLIF 25-Sep-2020 16:37:26 Left L5-S1, with bilateral L4-S1 pedicle fusion Candice Reyes

## 2020-09-25 NOTE — BRIEF OPERATIVE NOTE - OPERATION/FINDINGS
Grade II spondylolisthesis at L5-S1, with severe degenerative disc disease, facet arthropathy and neural compression.  CSF durotomy incurred during decompression, which was controlled and repaired by Duragen on-lay matrix.

## 2020-09-25 NOTE — CHART NOTE - NSCHARTNOTEFT_GEN_A_CORE
PACU ANESTHESIA ADMISSION NOTE      Procedure: Fusion, spine, lumbar, TLIF  Left L5-S1, with bilateral L4-S1 pedicle fusion      Post op diagnosis:  Lumbar spondylolysis        ____  Intubated  TV:______       Rate: ______      FiO2: ______    ____  Patent Airway    ____  Full return of protective reflexes    ____  Full recovery from anesthesia / back to baseline     Vitals:   T:    38      R:   14             BP: 146/84                 Sat:       97            P: 92      Mental Status:  __x__ Awake   _____ Alert   _____ Drowsy   _____ Sedated    Nausea/Vomiting:  __x__ NO  ______Yes,   See Post - Op Orders          Pain Scale (0-10):  __0___    Treatment: ____ None    ____ See Post - Op/PCA Orders    Post - Operative Fluids:   ____ Oral   __x__ See Post - Op Orders    Plan: Discharge:   ____Home       _x____Floor     _____Critical Care    _____  Other:_________________    Comments:

## 2020-09-25 NOTE — ASU PATIENT PROFILE, ADULT - VISION (WITH CORRECTIVE LENSES IF THE PATIENT USUALLY WEARS THEM):
corrected with glasses/Normal vision: sees adequately in most situations; can see medication labels, newsprint

## 2020-09-25 NOTE — ASU PATIENT PROFILE, ADULT - INTERNATIONAL TRAVEL
Request:   traZODone (DESYREL) 50 MG tablet TAKE 1 1/2- 2 TABLETS BY MOUTH AT NIGHT   Last rx'd: 7/16/19   1 mo supply   Refills: 0    Next: NONE  No show/pt cancel: 11/19/19  Last: 7/16/19    Verified dose against providers last note.      Approve 1 mo supply with 0 refills.       No

## 2020-09-26 LAB
ANION GAP SERPL CALC-SCNC: 9 MMOL/L — SIGNIFICANT CHANGE UP (ref 7–14)
BUN SERPL-MCNC: 18 MG/DL — SIGNIFICANT CHANGE UP (ref 10–20)
CALCIUM SERPL-MCNC: 8.9 MG/DL — SIGNIFICANT CHANGE UP (ref 8.5–10.1)
CHLORIDE SERPL-SCNC: 103 MMOL/L — SIGNIFICANT CHANGE UP (ref 98–110)
CO2 SERPL-SCNC: 28 MMOL/L — SIGNIFICANT CHANGE UP (ref 17–32)
CREAT SERPL-MCNC: 1 MG/DL — SIGNIFICANT CHANGE UP (ref 0.3–1)
GLUCOSE SERPL-MCNC: 114 MG/DL — HIGH (ref 70–99)
HCT VFR BLD CALC: 45.9 % — SIGNIFICANT CHANGE UP (ref 42–52)
HGB BLD-MCNC: 15.2 G/DL — SIGNIFICANT CHANGE UP (ref 14–18)
MCHC RBC-ENTMCNC: 30.2 PG — SIGNIFICANT CHANGE UP (ref 27–31)
MCHC RBC-ENTMCNC: 33.1 G/DL — SIGNIFICANT CHANGE UP (ref 32–37)
MCV RBC AUTO: 91.3 FL — SIGNIFICANT CHANGE UP (ref 80–94)
NRBC # BLD: 0 /100 WBCS — SIGNIFICANT CHANGE UP (ref 0–0)
PLATELET # BLD AUTO: 233 K/UL — SIGNIFICANT CHANGE UP (ref 130–400)
POTASSIUM SERPL-MCNC: 4.4 MMOL/L — SIGNIFICANT CHANGE UP (ref 3.5–5)
POTASSIUM SERPL-SCNC: 4.4 MMOL/L — SIGNIFICANT CHANGE UP (ref 3.5–5)
RBC # BLD: 5.03 M/UL — SIGNIFICANT CHANGE UP (ref 4.7–6.1)
RBC # FLD: 13.2 % — SIGNIFICANT CHANGE UP (ref 11.5–14.5)
SODIUM SERPL-SCNC: 140 MMOL/L — SIGNIFICANT CHANGE UP (ref 135–146)
WBC # BLD: 16.79 K/UL — HIGH (ref 4.8–10.8)
WBC # FLD AUTO: 16.79 K/UL — HIGH (ref 4.8–10.8)

## 2020-09-26 RX ORDER — LANOLIN ALCOHOL/MO/W.PET/CERES
5 CREAM (GRAM) TOPICAL AT BEDTIME
Refills: 0 | Status: DISCONTINUED | OUTPATIENT
Start: 2020-09-26 | End: 2020-09-28

## 2020-09-26 RX ORDER — SODIUM CHLORIDE 9 MG/ML
500 INJECTION INTRAMUSCULAR; INTRAVENOUS; SUBCUTANEOUS ONCE
Refills: 0 | Status: COMPLETED | OUTPATIENT
Start: 2020-09-26 | End: 2020-09-26

## 2020-09-26 RX ORDER — OXYCODONE AND ACETAMINOPHEN 5; 325 MG/1; MG/1
2 TABLET ORAL EVERY 4 HOURS
Refills: 0 | Status: DISCONTINUED | OUTPATIENT
Start: 2020-09-26 | End: 2020-09-28

## 2020-09-26 RX ORDER — HYDROMORPHONE HYDROCHLORIDE 2 MG/ML
1 INJECTION INTRAMUSCULAR; INTRAVENOUS; SUBCUTANEOUS
Refills: 0 | Status: DISCONTINUED | OUTPATIENT
Start: 2020-09-26 | End: 2020-09-28

## 2020-09-26 RX ORDER — OXYCODONE AND ACETAMINOPHEN 5; 325 MG/1; MG/1
1 TABLET ORAL EVERY 4 HOURS
Refills: 0 | Status: DISCONTINUED | OUTPATIENT
Start: 2020-09-26 | End: 2020-09-28

## 2020-09-26 RX ADMIN — Medication 0.5 MILLIGRAM(S): at 14:57

## 2020-09-26 RX ADMIN — ONDANSETRON 4 MILLIGRAM(S): 8 TABLET, FILM COATED ORAL at 08:21

## 2020-09-26 RX ADMIN — METHOCARBAMOL 750 MILLIGRAM(S): 500 TABLET, FILM COATED ORAL at 05:31

## 2020-09-26 RX ADMIN — METHOCARBAMOL 750 MILLIGRAM(S): 500 TABLET, FILM COATED ORAL at 17:17

## 2020-09-26 RX ADMIN — Medication 100 MILLIGRAM(S): at 05:31

## 2020-09-26 RX ADMIN — HEPARIN SODIUM 5000 UNIT(S): 5000 INJECTION INTRAVENOUS; SUBCUTANEOUS at 23:00

## 2020-09-26 RX ADMIN — SODIUM CHLORIDE 500 MILLILITER(S): 9 INJECTION INTRAMUSCULAR; INTRAVENOUS; SUBCUTANEOUS at 15:17

## 2020-09-26 RX ADMIN — HYDROMORPHONE HYDROCHLORIDE 1 MILLIGRAM(S): 2 INJECTION INTRAMUSCULAR; INTRAVENOUS; SUBCUTANEOUS at 16:19

## 2020-09-26 RX ADMIN — PANTOPRAZOLE SODIUM 40 MILLIGRAM(S): 20 TABLET, DELAYED RELEASE ORAL at 05:32

## 2020-09-26 RX ADMIN — HYDROMORPHONE HYDROCHLORIDE 1 MILLIGRAM(S): 2 INJECTION INTRAMUSCULAR; INTRAVENOUS; SUBCUTANEOUS at 16:49

## 2020-09-26 RX ADMIN — METHOCARBAMOL 750 MILLIGRAM(S): 500 TABLET, FILM COATED ORAL at 23:02

## 2020-09-26 RX ADMIN — METHOCARBAMOL 750 MILLIGRAM(S): 500 TABLET, FILM COATED ORAL at 12:13

## 2020-09-26 RX ADMIN — ONDANSETRON 4 MILLIGRAM(S): 8 TABLET, FILM COATED ORAL at 17:21

## 2020-09-26 RX ADMIN — ONDANSETRON 4 MILLIGRAM(S): 8 TABLET, FILM COATED ORAL at 01:26

## 2020-09-26 RX ADMIN — HEPARIN SODIUM 5000 UNIT(S): 5000 INJECTION INTRAVENOUS; SUBCUTANEOUS at 05:32

## 2020-09-26 RX ADMIN — Medication 0.5 MILLIGRAM(S): at 22:23

## 2020-09-26 RX ADMIN — Medication 5 MILLIGRAM(S): at 23:02

## 2020-09-26 RX ADMIN — OXYCODONE AND ACETAMINOPHEN 2 TABLET(S): 5; 325 TABLET ORAL at 20:13

## 2020-09-26 RX ADMIN — HEPARIN SODIUM 5000 UNIT(S): 5000 INJECTION INTRAVENOUS; SUBCUTANEOUS at 13:34

## 2020-09-26 RX ADMIN — Medication 100 MILLIGRAM(S): at 13:35

## 2020-09-26 NOTE — CHART NOTE - NSCHARTNOTEFT_GEN_A_CORE
Pt c/o increased back pain and had decr u/o. Fluid bolus given. Pt c/o need to void. Shirley flushed. 1500cc u/o. Pt comfortable. Shirley D/C'd

## 2020-09-26 NOTE — PROGRESS NOTE ADULT - SUBJECTIVE AND OBJECTIVE BOX
POD # 1    S/P L5-S1 TLIF with Pedicle Screw Fixation    Pt seen and examined at bedside. Pt c/o incisional pain at this time      Vital Signs Last 24 Hrs  T(C): 35.6 (26 Sep 2020 05:58), Max: 37.1 (25 Sep 2020 08:50)  T(F): 96 (26 Sep 2020 05:58), Max: 98.5 (25 Sep 2020 20:00)  HR: 55 (26 Sep 2020 05:58) (55 - 88)  BP: 121/70 (26 Sep 2020 05:58) (121/70 - 166/80)  BP(mean): --  RR: 17 (26 Sep 2020 05:58) (16 - 25)  SpO2: 97% (26 Sep 2020 02:00) (93% - 97%)    PHYSICAL EXAM:          MEDICATIONS:  Antibiotics:  ceFAZolin   IVPB 1000 milliGRAM(s) IV Intermittent every 8 hours    Neuro:  acetaminophen   Tablet .. 650 milliGRAM(s) Oral every 6 hours PRN  clonazePAM  Tablet 0.5 milliGRAM(s) Oral every 12 hours PRN  HYDROmorphone PCA (1 mG/mL) 30 milliLiter(s) PCA Continuous PCA Continuous  melatonin 5 milliGRAM(s) Oral at bedtime  methocarbamol 750 milliGRAM(s) Oral every 6 hours  ondansetron Injectable 4 milliGRAM(s) IV Push every 6 hours PRN    Anticoagulation:  heparin   Injectable 5000 Unit(s) SubCutaneous every 8 hours    OTHER:  bisacodyl 5 milliGRAM(s) Oral daily PRN  pantoprazole    Tablet 40 milliGRAM(s) Oral before breakfast  senna 2 Tablet(s) Oral at bedtime PRN    IVF:  sodium chloride 0.9%. 1000 milliLiter(s) IV Continuous <Continuous>    Assessment:  As above    Plan:  PT/Rehab POD # 1    S/P L5-S1 TLIF with Pedicle Screw Fixation    Pt seen and examined at bedside. Pt c/o incisional pain at this time. Sts improvement in his back pain from pre-op. Denies lower extremity radiculopathy, parasthesias      Vital Signs Last 24 Hrs  T(C): 35.6 (26 Sep 2020 05:58), Max: 37.1 (25 Sep 2020 08:50)  T(F): 96 (26 Sep 2020 05:58), Max: 98.5 (25 Sep 2020 20:00)  HR: 55 (26 Sep 2020 05:58) (55 - 88)  BP: 121/70 (26 Sep 2020 05:58) (121/70 - 166/80)  BP(mean): --  RR: 17 (26 Sep 2020 05:58) (16 - 25)  SpO2: 97% (26 Sep 2020 02:00) (93% - 97%)  U/OL 600cc    PHYSICAL EXAM:  Strength 5/5  Sensation intact to light touch  KJ 2+  Dressing - mild bloody d/c    MEDICATIONS:  Antibiotics:  ceFAZolin   IVPB 1000 milliGRAM(s) IV Intermittent every 8 hours    Neuro:  acetaminophen   Tablet .. 650 milliGRAM(s) Oral every 6 hours PRN  clonazePAM  Tablet 0.5 milliGRAM(s) Oral every 12 hours PRN  HYDROmorphone PCA (1 mG/mL) 30 milliLiter(s) PCA Continuous PCA Continuous  melatonin 5 milliGRAM(s) Oral at bedtime  methocarbamol 750 milliGRAM(s) Oral every 6 hours  ondansetron Injectable 4 milliGRAM(s) IV Push every 6 hours PRN    Anticoagulation:  heparin   Injectable 5000 Unit(s) SubCutaneous every 8 hours    OTHER:  bisacodyl 5 milliGRAM(s) Oral daily PRN  pantoprazole    Tablet 40 milliGRAM(s) Oral before breakfast  senna 2 Tablet(s) Oral at bedtime PRN    IVF:  sodium chloride 0.9%. 1000 milliLiter(s) IV Continuous <Continuous>    Assessment:  As above    Plan:  PT/Rehab  D/C PCA  Dilaudid, Percocet PRN  SQ Heparin

## 2020-09-27 RX ADMIN — METHOCARBAMOL 750 MILLIGRAM(S): 500 TABLET, FILM COATED ORAL at 12:44

## 2020-09-27 RX ADMIN — Medication 0.5 MILLIGRAM(S): at 21:22

## 2020-09-27 RX ADMIN — OXYCODONE AND ACETAMINOPHEN 2 TABLET(S): 5; 325 TABLET ORAL at 22:56

## 2020-09-27 RX ADMIN — OXYCODONE AND ACETAMINOPHEN 2 TABLET(S): 5; 325 TABLET ORAL at 12:48

## 2020-09-27 RX ADMIN — OXYCODONE AND ACETAMINOPHEN 1 TABLET(S): 5; 325 TABLET ORAL at 06:54

## 2020-09-27 RX ADMIN — OXYCODONE AND ACETAMINOPHEN 2 TABLET(S): 5; 325 TABLET ORAL at 18:46

## 2020-09-27 RX ADMIN — PANTOPRAZOLE SODIUM 40 MILLIGRAM(S): 20 TABLET, DELAYED RELEASE ORAL at 06:54

## 2020-09-27 RX ADMIN — ONDANSETRON 4 MILLIGRAM(S): 8 TABLET, FILM COATED ORAL at 16:17

## 2020-09-27 RX ADMIN — HYDROMORPHONE HYDROCHLORIDE 1 MILLIGRAM(S): 2 INJECTION INTRAMUSCULAR; INTRAVENOUS; SUBCUTANEOUS at 07:57

## 2020-09-27 RX ADMIN — HEPARIN SODIUM 5000 UNIT(S): 5000 INJECTION INTRAVENOUS; SUBCUTANEOUS at 22:57

## 2020-09-27 RX ADMIN — HEPARIN SODIUM 5000 UNIT(S): 5000 INJECTION INTRAVENOUS; SUBCUTANEOUS at 15:39

## 2020-09-27 RX ADMIN — OXYCODONE AND ACETAMINOPHEN 2 TABLET(S): 5; 325 TABLET ORAL at 13:18

## 2020-09-27 RX ADMIN — OXYCODONE AND ACETAMINOPHEN 2 TABLET(S): 5; 325 TABLET ORAL at 19:16

## 2020-09-27 RX ADMIN — ONDANSETRON 4 MILLIGRAM(S): 8 TABLET, FILM COATED ORAL at 07:57

## 2020-09-27 RX ADMIN — HYDROMORPHONE HYDROCHLORIDE 1 MILLIGRAM(S): 2 INJECTION INTRAMUSCULAR; INTRAVENOUS; SUBCUTANEOUS at 08:27

## 2020-09-27 RX ADMIN — HEPARIN SODIUM 5000 UNIT(S): 5000 INJECTION INTRAVENOUS; SUBCUTANEOUS at 06:54

## 2020-09-27 RX ADMIN — Medication 5 MILLIGRAM(S): at 22:56

## 2020-09-27 RX ADMIN — HYDROMORPHONE HYDROCHLORIDE 1 MILLIGRAM(S): 2 INJECTION INTRAMUSCULAR; INTRAVENOUS; SUBCUTANEOUS at 16:12

## 2020-09-27 RX ADMIN — HYDROMORPHONE HYDROCHLORIDE 1 MILLIGRAM(S): 2 INJECTION INTRAMUSCULAR; INTRAVENOUS; SUBCUTANEOUS at 03:20

## 2020-09-27 RX ADMIN — METHOCARBAMOL 750 MILLIGRAM(S): 500 TABLET, FILM COATED ORAL at 06:54

## 2020-09-27 RX ADMIN — HYDROMORPHONE HYDROCHLORIDE 1 MILLIGRAM(S): 2 INJECTION INTRAMUSCULAR; INTRAVENOUS; SUBCUTANEOUS at 15:42

## 2020-09-27 RX ADMIN — METHOCARBAMOL 750 MILLIGRAM(S): 500 TABLET, FILM COATED ORAL at 22:57

## 2020-09-27 RX ADMIN — METHOCARBAMOL 750 MILLIGRAM(S): 500 TABLET, FILM COATED ORAL at 17:10

## 2020-09-27 NOTE — PHYSICAL THERAPY INITIAL EVALUATION ADULT - GENERAL OBSERVATIONS, REHAB EVAL
pt encountered supine in bed in NAD - agreeable to PT IE - family bedside - pt educated on BLT precautions - pt anxious about moving and PT - pt tolerated session very well

## 2020-09-27 NOTE — PROGRESS NOTE ADULT - SUBJECTIVE AND OBJECTIVE BOX
Subjective: 19yMale with a pmhx of [phone] [id number]   M43.17    [id number]   M43.17    Family history of diabetes mellitus (DM)    Handoff    MEWS Score    Anxiety    Anxiety    Depression    No pertinent past medical history    Lumbar spondylolysis    Lumbar spondylolysis    Fusion, spine, lumbar, TLIF    S/P ACL repair    History of tonsillectomy    [id number]        POD #2    S/P L5-S1 TLIF with Pedicle Screw Fixation    Pt seen and examined at bedside.  Pt c/o incisional pain only.  Denies radicular pain, numbness or tingling.   Pt has voided on his own since murray removed last night.  Sts improvement in his back pain from pre-op. Denies headaches, sitting up in bed.     Allergies    No Known Allergies    Intolerances        Vital Signs Last 24 Hrs  T(C): 36 (27 Sep 2020 06:49), Max: 36.3 (26 Sep 2020 14:21)  T(F): 96.8 (27 Sep 2020 06:49), Max: 97.4 (26 Sep 2020 14:21)  HR: 82 (27 Sep 2020 06:49) (51 - 92)  BP: 123/60 (27 Sep 2020 06:49) (123/60 - 139/81)  BP(mean): --  RR: 20 (27 Sep 2020 06:49) (18 - 20)  SpO2: --      acetaminophen   Tablet .. 650 milliGRAM(s) Oral every 6 hours PRN  bisacodyl 5 milliGRAM(s) Oral daily PRN  clonazePAM  Tablet 0.5 milliGRAM(s) Oral every 12 hours PRN  heparin   Injectable 5000 Unit(s) SubCutaneous every 8 hours  HYDROmorphone  Injectable 1 milliGRAM(s) IV Push every 3 hours PRN  melatonin 5 milliGRAM(s) Oral at bedtime  methocarbamol 750 milliGRAM(s) Oral every 6 hours  ondansetron Injectable 4 milliGRAM(s) IV Push every 6 hours PRN  oxycodone    5 mG/acetaminophen 325 mG 1 Tablet(s) Oral every 4 hours PRN  oxycodone    5 mG/acetaminophen 325 mG 2 Tablet(s) Oral every 4 hours PRN  pantoprazole    Tablet 40 milliGRAM(s) Oral before breakfast  senna 2 Tablet(s) Oral at bedtime PRN  sodium chloride 0.9%. 1000 milliLiter(s) IV Continuous <Continuous>        09-26-20 @ 07:01  -  09-27-20 @ 07:00  --------------------------------------------------------  IN: 500 mL / OUT: 1100 mL / NET: -600 mL        REVIEW OF SYSTEMS    [x ] A ten-point review of systems was otherwise negative except as noted.  [ ] Due to altered mental status/intubation, subjective information were not able to be obtained from the patient. History was obtained, to the extent possible, from review of the chart and collateral sources of information.      Exam:  AAOX3. Verbal function intact  follows commands  PERRL  Motor: MAEx4  5/5 power in b/l UE   5/5 power in b/l LE's  5/5 DF/PF  Sensation: intact         CBC Full  -  ( 26 Sep 2020 17:05 )  WBC Count : 16.79 K/uL  RBC Count : 5.03 M/uL  Hemoglobin : 15.2 g/dL  Hematocrit : 45.9 %  Platelet Count - Automated : 233 K/uL  Mean Cell Volume : 91.3 fL  Mean Cell Hemoglobin : 30.2 pg  Mean Cell Hemoglobin Concentration : 33.1 g/dL  Auto Neutrophil # : x  Auto Lymphocyte # : x  Auto Monocyte # : x  Auto Eosinophil # : x  Auto Basophil # : x  Auto Neutrophil % : x  Auto Lymphocyte % : x  Auto Monocyte % : x  Auto Eosinophil % : x  Auto Basophil % : x    09-26    140  |  103  |  18  ----------------------------<  114<H>  4.4   |  28  |  1.0    Ca    8.9      26 Sep 2020 17:05            Assessment/Plan:   cont PT/rehab  cont pain med regimen  wear TLSO for ambulation  d/w attg

## 2020-09-28 ENCOUNTER — TRANSCRIPTION ENCOUNTER (OUTPATIENT)
Age: 20
End: 2020-09-28

## 2020-09-28 VITALS
RESPIRATION RATE: 18 BRPM | SYSTOLIC BLOOD PRESSURE: 139 MMHG | DIASTOLIC BLOOD PRESSURE: 79 MMHG | TEMPERATURE: 98 F | HEART RATE: 84 BPM

## 2020-09-28 LAB
HCT VFR BLD CALC: 42.8 % — SIGNIFICANT CHANGE UP (ref 42–52)
HGB BLD-MCNC: 14.1 G/DL — SIGNIFICANT CHANGE UP (ref 14–18)
MCHC RBC-ENTMCNC: 29.6 PG — SIGNIFICANT CHANGE UP (ref 27–31)
MCHC RBC-ENTMCNC: 32.9 G/DL — SIGNIFICANT CHANGE UP (ref 32–37)
MCV RBC AUTO: 89.9 FL — SIGNIFICANT CHANGE UP (ref 80–94)
NRBC # BLD: 0 /100 WBCS — SIGNIFICANT CHANGE UP (ref 0–0)
PLATELET # BLD AUTO: 193 K/UL — SIGNIFICANT CHANGE UP (ref 130–400)
RBC # BLD: 4.76 M/UL — SIGNIFICANT CHANGE UP (ref 4.7–6.1)
RBC # FLD: 13 % — SIGNIFICANT CHANGE UP (ref 11.5–14.5)
WBC # BLD: 10.5 K/UL — SIGNIFICANT CHANGE UP (ref 4.8–10.8)
WBC # FLD AUTO: 10.5 K/UL — SIGNIFICANT CHANGE UP (ref 4.8–10.8)

## 2020-09-28 RX ORDER — SENNA PLUS 8.6 MG/1
2 TABLET ORAL
Qty: 0 | Refills: 0 | DISCHARGE
Start: 2020-09-28

## 2020-09-28 RX ORDER — PANTOPRAZOLE SODIUM 20 MG/1
1 TABLET, DELAYED RELEASE ORAL
Qty: 0 | Refills: 0 | DISCHARGE
Start: 2020-09-28

## 2020-09-28 RX ORDER — METHOCARBAMOL 500 MG/1
1 TABLET, FILM COATED ORAL
Qty: 0 | Refills: 0 | DISCHARGE
Start: 2020-09-28

## 2020-09-28 RX ORDER — PANTOPRAZOLE SODIUM 20 MG/1
1 TABLET, DELAYED RELEASE ORAL
Qty: 30 | Refills: 0
Start: 2020-09-28 | End: 2020-10-27

## 2020-09-28 RX ORDER — SENNA PLUS 8.6 MG/1
2 TABLET ORAL
Qty: 20 | Refills: 0
Start: 2020-09-28 | End: 2020-10-07

## 2020-09-28 RX ORDER — METHOCARBAMOL 500 MG/1
1 TABLET, FILM COATED ORAL
Qty: 20 | Refills: 0
Start: 2020-09-28 | End: 2020-10-02

## 2020-09-28 RX ADMIN — OXYCODONE AND ACETAMINOPHEN 2 TABLET(S): 5; 325 TABLET ORAL at 10:59

## 2020-09-28 RX ADMIN — METHOCARBAMOL 750 MILLIGRAM(S): 500 TABLET, FILM COATED ORAL at 11:01

## 2020-09-28 RX ADMIN — HEPARIN SODIUM 5000 UNIT(S): 5000 INJECTION INTRAVENOUS; SUBCUTANEOUS at 14:49

## 2020-09-28 RX ADMIN — METHOCARBAMOL 750 MILLIGRAM(S): 500 TABLET, FILM COATED ORAL at 05:21

## 2020-09-28 RX ADMIN — HEPARIN SODIUM 5000 UNIT(S): 5000 INJECTION INTRAVENOUS; SUBCUTANEOUS at 05:21

## 2020-09-28 RX ADMIN — OXYCODONE AND ACETAMINOPHEN 2 TABLET(S): 5; 325 TABLET ORAL at 04:42

## 2020-09-28 RX ADMIN — PANTOPRAZOLE SODIUM 40 MILLIGRAM(S): 20 TABLET, DELAYED RELEASE ORAL at 05:21

## 2020-09-28 NOTE — PROGRESS NOTE ADULT - SUBJECTIVE AND OBJECTIVE BOX
HISTORY OF PRESENT ILLNESS: POD #3 s/p L5-S1 TLIf. Pt seen and examine chela bedside pt states his pain is a 2/10 and is feeling well.     PAST MEDICAL & SURGICAL HISTORY:  Anxiety    S/P ACL repair  x2    History of tonsillectomy      FAMILY HISTORY:  Family history of diabetes mellitus (DM)    Allergies  No Known Allergies    Intolerances    REVIEW OF SYSTEMS  General:	  Skin/Breast:  Ophthalmologic:  ENMT:	  Respiratory and Thorax:  Cardiovascular:	  Gastrointestinal:	  Genitourinary:	  Musculoskeletal:	  Neurological:	  Psychiatric:	  Hematology/Lymphatics:	  Endocrine:	  Allergic/Immunologic:	    MEDICATIONS:  Antibiotics:    Neuro:  acetaminophen   Tablet .. 650 milliGRAM(s) Oral every 6 hours PRN  clonazePAM  Tablet 0.5 milliGRAM(s) Oral every 12 hours PRN  HYDROmorphone  Injectable 1 milliGRAM(s) IV Push every 3 hours PRN  melatonin 5 milliGRAM(s) Oral at bedtime  methocarbamol 750 milliGRAM(s) Oral every 6 hours  ondansetron Injectable 4 milliGRAM(s) IV Push every 6 hours PRN  oxycodone    5 mG/acetaminophen 325 mG 1 Tablet(s) Oral every 4 hours PRN  oxycodone    5 mG/acetaminophen 325 mG 2 Tablet(s) Oral every 4 hours PRN    Anticoagulation:  heparin   Injectable 5000 Unit(s) SubCutaneous every 8 hours    OTHER:  bisacodyl 5 milliGRAM(s) Oral daily PRN  pantoprazole    Tablet 40 milliGRAM(s) Oral before breakfast  senna 2 Tablet(s) Oral at bedtime PRN    IVF:  sodium chloride 0.9%. 1000 milliLiter(s) IV Continuous <Continuous>      Vital Signs Last 24 Hrs  T(C): 36.9 (28 Sep 2020 05:00), Max: 37.1 (27 Sep 2020 21:53)  T(F): 98.5 (28 Sep 2020 05:00), Max: 98.8 (27 Sep 2020 21:53)  HR: 83 (28 Sep 2020 05:00) (83 - 92)  BP: 128/71 (28 Sep 2020 05:00) (128/71 - 139/75)  BP(mean): --  RR: 18 (28 Sep 2020 05:00) (18 - 18)  SpO2: --    Physical Exam :  General :   A&O x 3   Tongue midline  Facial features symmetric, No droop  Speech clear and appropriate, no slur   Pt speaking in full sentences   Follows all commands   Occular :   MAXIM JUNG  Motor :   MAEx4 b/l  strength 5/5 in b/l LE & UE   dorsiflexion / plantarflexion intact   no pain on straight leg raise   Shoulder shrug intact   Full ROM of neck   No spinal point tenderness   Withdraws / Extends to painful stimuli  Sensory :  Intact bilaterally in UE & LE    Wound : Pt reports 2/10 pain in incision site.     LABS:                        14.1   10.50 )-----------( 193      ( 28 Sep 2020 04:30 )             42.8     09-26    140  |  103  |  18  ----------------------------<  114<H>  4.4   |  28  |  1.0    Ca    8.9      26 Sep 2020 17:05    RADIOLOGY & ADDITIONAL STUDIES:    Assessment / Plan: Pt seen and examined at bedside POD #3. Pt requesting to be discharged home today, states he is in 2/10 pain and has ambulated with PT.   -   -Will discuss with attending

## 2020-09-28 NOTE — DISCHARGE NOTE PROVIDER - CARE PROVIDER_API CALL
Candice Reyes  [facility]  [address] [place] [address]  Phone: [phone]  Fax: [phone]  Follow Up Time:

## 2020-09-28 NOTE — DISCHARGE NOTE PROVIDER - NSDCFUADDINST_GEN_ALL_CORE_FT
- Upon discharge,  please call to schedule a follow up with Dr. Reyes in 1-2 weeks.  - Upon discharge, please call to make a follow up appointment with your primary care provider to discuss your recent hospitalization/operation.  - Keep dressings dry for 48 hours after which you may remove dressing and cleanse with soap and water in the shower (no scrubbing).  Run water and soap over incision sites and pat dry (no scrubbing). No submerging your incision sites in water (i.e. no swimming or baths) for 2-3 weeks and avoid exposing the area to jets/streams of water.   - You can resume your normal activities as tolerated, but avoid heavy (>15lb.) lifting and strenuous exercise for 4-6 weeks.    - You were prescribed percocet (oxycodone-acetaminophen) for pain, take these only as needed.  Your pain should subside over the next few days.  While taking narcotic pain medications, you should not drive or operate heavy machinery. If taking with other medications containing acetaminophen (Tylenol), reduce your dose of percocet so that you  do not exceed 3000mg of acetaminophen per day.  ***You were prescribed narcotic pain medications, take these only as needed.  Your pain should subside over the next few days.  While taking narcotic pain medications, you should not drive or operate heavy machinery.   - Narcotic pain medicine tends to cause constipation, you have been prescribed colace 3 times a day to help prevent that. Hold the colace if you start to have loose stools.  - You were prescribed Robaxin for muscle spasm, only take this medication as needed.   - If you experience fevers, chills, increasing abdominal pain, nausea, vomiting, inability to pass stool or gas, bleeding, or any other acute symptoms, please call your doctor and report to the emergency room immediately for further management.

## 2020-09-28 NOTE — DISCHARGE NOTE PROVIDER - NSDCMRMEDTOKEN_GEN_ALL_CORE_FT
oxycodone-acetaminophen 5 mg-325 mg oral tablet: 2 tab(s) orally every 6 hours, As Needed -Moderate Pain (4 - 6) MDD:8  pantoprazole 40 mg oral delayed release tablet: 1 tab(s) orally once a day (before a meal)  Robaxin-750 oral tablet: 1 tab(s) orally every 6 hours, As Needed -for muscle spasm MDD:4  senna oral tablet: 2 tab(s) orally once a day (at bedtime), As Needed -Constipation - for congestion - for constipation

## 2020-09-28 NOTE — DISCHARGE NOTE PROVIDER - NSDCACTIVITY_GEN_ALL_CORE
Stairs allowed/Showering allowed/Walking - Indoors allowed/Do not make important decisions/No heavy lifting/straining/Walking - Outdoors allowed/Do not drive or operate machinery

## 2020-09-28 NOTE — DISCHARGE NOTE PROVIDER - NSDCCPTREATMENT_GEN_ALL_CORE_FT
PRINCIPAL PROCEDURE  Procedure: Fusion, spine, lumbar, TLIF  Findings and Treatment: Left L5-S1, with bilateral L4-S1 pedicle fusion

## 2020-09-28 NOTE — DISCHARGE NOTE NURSING/CASE MANAGEMENT/SOCIAL WORK - PATIENT PORTAL LINK FT
You can access the FollowMyHealth Patient Portal offered by [facility] by registering at the following website: [email or web address]. By joining [employer]’s FollowMyHealth portal, you will also be able to view your health information using other applications (apps) compatible with our system.

## 2020-09-28 NOTE — PROGRESS NOTE ADULT - ATTENDING COMMENTS
The patient was seen and examined at bedside.  Golf is at bedside.  Patient appears comfortable, reports that his symptoms are improved, compared with the day prior.  Neurological examination lower 70s remained stable.  Patient denies headache.  Patient has been sitting at least, up to, 45-90°, without evidence of headache.  PT evaluation was pending.    Plan: Continue with oral pain medications, DVT prophylaxis, mobilization out of bed with physical therapy.  Discharge will be pending clearance by physical therapy.
Patient seen and examined in the operating room, given that he was on PACU fold.    Neurological examination remained stable, compared with preoperative neurological examination.  Discussion was held with the patient's mother.
The patient's status was discussed with neurosurgery KRISHNA Ramirez.  Agree with slow mobilization out of bed.  First, agree with slow increase in bed height, in order to ensure that patient does not have any headache.    Initiate DVT prophylaxis, appropriate pain control.
Discussed with neurosurgery PAEmmett.    In the patient be discharged, patient must be cleared for stairs by physical therapy.  Conversation was held with the patient's mother, yesterday, which expressed, that the patient had approximately 12 stairs at home, which the patient would need to traverse from his room to the bathroom.  Once he is cleared by physical therapy for stairs, patient can be discharged home, with instructions to follow up with me in the office in approximately 2 weeks.

## 2020-09-28 NOTE — DISCHARGE NOTE PROVIDER - NSDCHHNEEDSERVICE_GEN_ALL_CORE
Medication teaching and assessment/Teaching and training/Observation and assessment/Wound care and assessment/Rehabilitation services

## 2020-09-28 NOTE — DISCHARGE NOTE PROVIDER - HOSPITAL COURSE
Patient arrived at the hospital on 9.25.20 for a scheduled TLIF with Dr. Reyes. The patient was cleared by pre admission testing and was brought to the OR for surgery - Upon discharge,  please call to schedule a follow The patient was cleared by pre admission testing and was brought to the OR for surgery - Upon discharge,  please call to schedule a follow   20yo M w/ that presented with persistent lower back pain arrived at the hospital on 9.25.20 for a scheduled TLIF with Dr. Reyes. The patient was admitted to the surgical service and subsequently underwent an L5-S1 TLIF. The patient tolerated the procedure well and postoperatively was transferred to the PACU and subsequently to the floor. Patient's diet was advanced as tolerated and his pain was controlled with IV, and subsequently po, pain medication as needed. On post op day 3, the patient ambulated with physical therapy and was cleared for discharge, the patient was afebrile, tolerating a regular diet, voiding appropriately, ambulating without difficulty, making flatus, and the patient's pain was well controlled. VS were WNL and pt was hemodynamically stable. Pt was medically cleared for discharge and patient was provided with discharge instructions regarding, but not limited to medication regimen and follow up.        The patient was cleared by pre admission testing and was brought to the OR for surgery - Upon discharge,  please call to schedule a follow   18yo M w/ that presented with persistent lower back pain arrived at the hospital on 9.25.20 for a scheduled TLIF with Dr. Reyes. The patient was admitted to the surgical service and subsequently underwent an L5-S1 TLIF. The patient tolerated the procedure well and postoperatively was transferred to the PACU and subsequently to the floor. Patient's diet was advanced as tolerated and his pain was controlled with IV, and subsequently po, pain medication as needed. On post op day 3, the patient ambulated with physical therapy and was cleared for discharge, the patient was afebrile, tolerating a regular diet, voiding appropriately, ambulating without difficulty, making flatus, and the patient's pain was well controlled. VS were WNL and pt was hemodynamically stable. Pt was medically cleared for discharge and patient was provided with discharge instructions regarding, but not limited to medication regimen and follow up. Pt given script for a rolling walker and 3:1 commode at time of d/c.

## 2020-09-30 LAB — SURGICAL PATHOLOGY STUDY: SIGNIFICANT CHANGE UP

## 2020-10-02 ENCOUNTER — INPATIENT (INPATIENT)
Facility: HOSPITAL | Age: 20
LOS: 9 days | Discharge: HOME HEALTH | End: 2020-10-12
Admitting: NEUROLOGICAL SURGERY
Payer: COMMERCIAL

## 2020-10-02 VITALS
TEMPERATURE: 98 F | OXYGEN SATURATION: 97 % | DIASTOLIC BLOOD PRESSURE: 77 MMHG | WEIGHT: 315 LBS | SYSTOLIC BLOOD PRESSURE: 137 MMHG | HEART RATE: 80 BPM | RESPIRATION RATE: 20 BRPM

## 2020-10-02 DIAGNOSIS — Z90.89 ACQUIRED ABSENCE OF OTHER ORGANS: Chronic | ICD-10-CM

## 2020-10-02 DIAGNOSIS — Z98.890 OTHER SPECIFIED POSTPROCEDURAL STATES: Chronic | ICD-10-CM

## 2020-10-02 LAB
ALBUMIN SERPL ELPH-MCNC: 4.3 G/DL — SIGNIFICANT CHANGE UP (ref 3.5–5.2)
ALP SERPL-CCNC: 49 U/L — SIGNIFICANT CHANGE UP (ref 30–115)
ALT FLD-CCNC: 58 U/L — HIGH (ref 13–38)
ANION GAP SERPL CALC-SCNC: 12 MMOL/L — SIGNIFICANT CHANGE UP (ref 7–14)
AST SERPL-CCNC: 33 U/L — SIGNIFICANT CHANGE UP (ref 13–38)
BASOPHILS # BLD AUTO: 0.03 K/UL — SIGNIFICANT CHANGE UP (ref 0–0.2)
BASOPHILS NFR BLD AUTO: 0.3 % — SIGNIFICANT CHANGE UP (ref 0–1)
BILIRUB SERPL-MCNC: 0.7 MG/DL — SIGNIFICANT CHANGE UP (ref 0.2–1.2)
BLD GP AB SCN SERPL QL: SIGNIFICANT CHANGE UP
BUN SERPL-MCNC: 20 MG/DL — SIGNIFICANT CHANGE UP (ref 10–20)
CALCIUM SERPL-MCNC: 9.7 MG/DL — SIGNIFICANT CHANGE UP (ref 8.5–10.1)
CHLORIDE SERPL-SCNC: 105 MMOL/L — SIGNIFICANT CHANGE UP (ref 98–110)
CO2 SERPL-SCNC: 24 MMOL/L — SIGNIFICANT CHANGE UP (ref 17–32)
CREAT SERPL-MCNC: 1 MG/DL — SIGNIFICANT CHANGE UP (ref 0.3–1)
EOSINOPHIL # BLD AUTO: 0.29 K/UL — SIGNIFICANT CHANGE UP (ref 0–0.7)
EOSINOPHIL NFR BLD AUTO: 2.5 % — SIGNIFICANT CHANGE UP (ref 0–8)
GLUCOSE SERPL-MCNC: 129 MG/DL — HIGH (ref 70–99)
HCT VFR BLD CALC: 46 % — SIGNIFICANT CHANGE UP (ref 42–52)
HGB BLD-MCNC: 15.1 G/DL — SIGNIFICANT CHANGE UP (ref 14–18)
IMM GRANULOCYTES NFR BLD AUTO: 0.7 % — HIGH (ref 0.1–0.3)
LYMPHOCYTES # BLD AUTO: 2.72 K/UL — SIGNIFICANT CHANGE UP (ref 1.2–3.4)
LYMPHOCYTES # BLD AUTO: 23.9 % — SIGNIFICANT CHANGE UP (ref 20.5–51.1)
MAGNESIUM SERPL-MCNC: 2.1 MG/DL — SIGNIFICANT CHANGE UP (ref 1.8–2.4)
MCHC RBC-ENTMCNC: 29.5 PG — SIGNIFICANT CHANGE UP (ref 27–31)
MCHC RBC-ENTMCNC: 32.8 G/DL — SIGNIFICANT CHANGE UP (ref 32–37)
MCV RBC AUTO: 90 FL — SIGNIFICANT CHANGE UP (ref 80–94)
MONOCYTES # BLD AUTO: 1.23 K/UL — HIGH (ref 0.1–0.6)
MONOCYTES NFR BLD AUTO: 10.8 % — HIGH (ref 1.7–9.3)
NEUTROPHILS # BLD AUTO: 7.05 K/UL — HIGH (ref 1.4–6.5)
NEUTROPHILS NFR BLD AUTO: 61.8 % — SIGNIFICANT CHANGE UP (ref 42.2–75.2)
NRBC # BLD: 0 /100 WBCS — SIGNIFICANT CHANGE UP (ref 0–0)
PLATELET # BLD AUTO: 271 K/UL — SIGNIFICANT CHANGE UP (ref 130–400)
POTASSIUM SERPL-MCNC: 4.4 MMOL/L — SIGNIFICANT CHANGE UP (ref 3.5–5)
POTASSIUM SERPL-SCNC: 4.4 MMOL/L — SIGNIFICANT CHANGE UP (ref 3.5–5)
PROT SERPL-MCNC: 7 G/DL — SIGNIFICANT CHANGE UP (ref 6.1–8)
RBC # BLD: 5.11 M/UL — SIGNIFICANT CHANGE UP (ref 4.7–6.1)
RBC # FLD: 12.7 % — SIGNIFICANT CHANGE UP (ref 11.5–14.5)
SODIUM SERPL-SCNC: 141 MMOL/L — SIGNIFICANT CHANGE UP (ref 135–146)
WBC # BLD: 11.4 K/UL — HIGH (ref 4.8–10.8)
WBC # FLD AUTO: 11.4 K/UL — HIGH (ref 4.8–10.8)

## 2020-10-02 PROCEDURE — 71045 X-RAY EXAM CHEST 1 VIEW: CPT | Mod: 26

## 2020-10-02 PROCEDURE — 72131 CT LUMBAR SPINE W/O DYE: CPT | Mod: 26

## 2020-10-02 PROCEDURE — 99285 EMERGENCY DEPT VISIT HI MDM: CPT

## 2020-10-02 PROCEDURE — 70450 CT HEAD/BRAIN W/O DYE: CPT | Mod: 26

## 2020-10-02 PROCEDURE — 93010 ELECTROCARDIOGRAM REPORT: CPT

## 2020-10-02 RX ORDER — SENNA PLUS 8.6 MG/1
2 TABLET ORAL AT BEDTIME
Refills: 0 | Status: DISCONTINUED | OUTPATIENT
Start: 2020-10-02 | End: 2020-10-03

## 2020-10-02 RX ORDER — ACETAZOLAMIDE 250 MG/1
500 TABLET ORAL
Refills: 0 | Status: DISCONTINUED | OUTPATIENT
Start: 2020-10-02 | End: 2020-10-03

## 2020-10-02 RX ORDER — KETOROLAC TROMETHAMINE 30 MG/ML
30 SYRINGE (ML) INJECTION ONCE
Refills: 0 | Status: DISCONTINUED | OUTPATIENT
Start: 2020-10-02 | End: 2020-10-02

## 2020-10-02 RX ORDER — CYCLOBENZAPRINE HYDROCHLORIDE 10 MG/1
10 TABLET, FILM COATED ORAL
Refills: 0 | Status: DISCONTINUED | OUTPATIENT
Start: 2020-10-02 | End: 2020-10-03

## 2020-10-02 RX ORDER — ONDANSETRON 8 MG/1
4 TABLET, FILM COATED ORAL ONCE
Refills: 0 | Status: COMPLETED | OUTPATIENT
Start: 2020-10-02 | End: 2020-10-02

## 2020-10-02 RX ORDER — SODIUM CHLORIDE 9 MG/ML
1000 INJECTION INTRAMUSCULAR; INTRAVENOUS; SUBCUTANEOUS ONCE
Refills: 0 | Status: COMPLETED | OUTPATIENT
Start: 2020-10-02 | End: 2020-10-02

## 2020-10-02 RX ORDER — PANTOPRAZOLE SODIUM 20 MG/1
40 TABLET, DELAYED RELEASE ORAL
Refills: 0 | Status: DISCONTINUED | OUTPATIENT
Start: 2020-10-02 | End: 2020-10-03

## 2020-10-02 RX ORDER — POLYETHYLENE GLYCOL 3350 17 G/17G
17 POWDER, FOR SOLUTION ORAL DAILY
Refills: 0 | Status: DISCONTINUED | OUTPATIENT
Start: 2020-10-02 | End: 2020-10-03

## 2020-10-02 RX ORDER — DIAZEPAM 5 MG
5 TABLET ORAL
Refills: 0 | Status: DISCONTINUED | OUTPATIENT
Start: 2020-10-02 | End: 2020-10-03

## 2020-10-02 RX ORDER — OXYCODONE AND ACETAMINOPHEN 5; 325 MG/1; MG/1
2 TABLET ORAL EVERY 6 HOURS
Refills: 0 | Status: DISCONTINUED | OUTPATIENT
Start: 2020-10-02 | End: 2020-10-03

## 2020-10-02 RX ORDER — LANOLIN ALCOHOL/MO/W.PET/CERES
5 CREAM (GRAM) TOPICAL AT BEDTIME
Refills: 0 | Status: DISCONTINUED | OUTPATIENT
Start: 2020-10-02 | End: 2020-10-03

## 2020-10-02 RX ADMIN — ACETAZOLAMIDE 500 MILLIGRAM(S): 250 TABLET ORAL at 17:24

## 2020-10-02 RX ADMIN — CYCLOBENZAPRINE HYDROCHLORIDE 10 MILLIGRAM(S): 10 TABLET, FILM COATED ORAL at 10:07

## 2020-10-02 RX ADMIN — Medication 5 MILLIGRAM(S): at 09:52

## 2020-10-02 RX ADMIN — Medication 5 MILLIGRAM(S): at 23:05

## 2020-10-02 RX ADMIN — ONDANSETRON 4 MILLIGRAM(S): 8 TABLET, FILM COATED ORAL at 07:35

## 2020-10-02 RX ADMIN — Medication 30 MILLIGRAM(S): at 07:35

## 2020-10-02 RX ADMIN — SODIUM CHLORIDE 2000 MILLILITER(S): 9 INJECTION INTRAMUSCULAR; INTRAVENOUS; SUBCUTANEOUS at 07:05

## 2020-10-02 RX ADMIN — Medication 5 MILLIGRAM(S): at 17:23

## 2020-10-02 NOTE — H&P ADULT - NSHPREVIEWOFSYSTEMS_GEN_ALL_CORE
20 yo male POD#7 s/p L5-S1 TLIF with dural leak repair, now with headache likely secondary to CSF leak.  Plan for blood patch as inpatient.     - admit to Dr. Reyes  - CTH and CT Lspine   - start diamox 500mg BID  - regular diet, IVF  - trend WBC and fever curve  - pain control prn: toradol, alternating flexeril/valium  - bowel regimen: senna  - bedrest  -HOB up to 30 degrees  - VTE proph: SQH 5000u q8h  - daily CBC/BMP/MG, replete electrolytes prn  - Dispo: pending clinical course

## 2020-10-02 NOTE — ED PROVIDER NOTE - PHYSICAL EXAMINATION
CONSTITUTIONAL: well-appearing, well developed male. In mild painful distress.   SKIN: Warm dry, normal skin turgor  HEAD: NCAT  EYES: EOMI, no scleral icterus  ENT: normal pharynx with no erythema or exudates  NECK: Supple. Full ROM. No midline tenderness.   CARD: RRR, no murmurs.  RESP: clear to ausculation b/l. No crackles or wheezing.  ABD: soft, non-tender, non-distended, no rebound or guarding.  EXT: Full ROM, no bony tenderness, no pedal edema, no calf tenderness  NEURO: 5/5 motor strength to bilateral UE and LE. No sensory deficits diffusely.   PSYCH: Cooperative, appropriate. CONSTITUTIONAL: well-appearing, well developed male. In mild painful distress.   SKIN: Warm dry, normal skin turgor  HEAD: NCAT  EYES: EOMI, no scleral icterus  ENT: normal pharynx with no erythema or exudates  NECK: Supple. Full ROM. No midline tenderness.   CARD: RRR, no murmurs.  RESP: clear to ausculation b/l. No crackles or wheezing.  ABD: soft, mild diffuse TTP, non-distended, no rebound or guarding.  EXT: Full ROM, no bony tenderness, no pedal edema, no calf tenderness  NEURO: 5/5 motor strength to bilateral UE and LE. No sensory deficits diffusely. No signs of saddle anesthesia; No focal deficits  PSYCH: Cooperative, appropriate.

## 2020-10-02 NOTE — ED PEDIATRIC TRIAGE NOTE - CHIEF COMPLAINT QUOTE
Pt with hx of spinal fusion last week came c/o severe headache, lower back pain and vomiting x 2 days

## 2020-10-02 NOTE — ED PROVIDER NOTE - CLINICAL SUMMARY MEDICAL DECISION MAKING FREE TEXT BOX
pt with recent lumbar fusion 9/25 with Dr. Reyes, discharge Mon presenting with worsening back pain, headache x Mon, postural, and nausea/vomiting x2d, non-bloody, non-bilious. No vision changes, numbness/focal weakness, saddle anesthesia, urinary/fecal incontinence. Has been taking prescribed home meds with minimal improvement. Per mom, called Dr. Reyes this AM, told to come in for admission. Per mom/pt, ambulatory with rolling walker. Non toxic. NCAT PERRLA no photophobia EOMI neck supple non tender FROM normal wob abdomen s nt nd no rebound no guarding WWPx4 neuro non focal. pt refusing to turn for examination of back- per pt, finally got comfortable, would prefer to move for examination later. per mom, she just looked at the incision pta, c/d/i, mild ecchymosis. no drainage. labs reviewed. dw nsx. requesting imaging and admit to Dr. Reyes's service, will follow.

## 2020-10-02 NOTE — ED PROVIDER NOTE - NS ED ROS FT
Constitutional:  (-) fever, (-) chills,   Eyes:  (-) eye pain (-) visual changes  ENMT: (+) neck pain   Cardiac: (-) chest pain (-) palpitations  Respiratory:  (-) cough (-) respiratory distress.   GI:  (-) nausea (-) vomiting (-) diarrhea (-) abdominal pain.  :  (-) dysuria (-) burning.  MS:  (+) neck and back pain, acute on chronic  Neuro:  (+) headache (-) focal weakness.  Skin:  (-) rash  Except as documented in the HPI,  all other systems are negative Constitutional:  (-) fever, (-) chills,   Eyes:  (-) eye pain (-) visual changes  ENMT: (+) neck pain   Cardiac: (-) chest pain (-) palpitations  Respiratory:  (-) cough (-) respiratory distress.   GI:  (+) nausea (+) vomiting (-) diarrhea (-) abdominal pain.  :  (-) dysuria (-) burning.  MS:  (+) neck and back pain, acute on chronic  Neuro:  (+) headache (-) focal weakness.  Skin:  (-) rash  Except as documented in the HPI,  all other systems are negative

## 2020-10-02 NOTE — H&P ADULT - HISTORY OF PRESENT ILLNESS
Patient is a 19y old  Male who presents with a chief complaint of headache    HISTORY OF PRESENT ILLNESS:   19 year old male with a h/o chronic sports-related vertebral degenerative changes, s/p L5-S1 TLIF with repaired dural leak with Dr. Reyes on 9/25/20, who was sent by his neurosurgeon to the ED for evaluation of progressively worsening positional diffuse headache, nausea with emesis, and back pain after being discharged 4 days ago. As per mother at the bedside, pt has been having headache escalating from 0-20 upon standing up, and has had 6-7 episodes of vomiting over the past 2 days. He also notes associated photophobia. Pt states symptoms are persistent and is unable to sleep due to pain.   He has been on Diazepam, Percocet, Robaxin, Acetazolamide, Senna, Protonix.  Medications were changed from Percocet to Fioricet for symptomatic control yesterday morning. Last pain medication taken was 11 PM last night, Diazepam.  Urinated this AM and able to defecate.  Otherwise, he denies vision changes, stool/urine incontinence or retention, weakness, focal deficits, or other sx and complaints including fever, chills, diarrhea, chest pain, dyspnea.  Denies saddle anesthesia, paresthesias.       PAST MEDICAL & SURGICAL HISTORY:  Anxiety    S/P ACL repair  x2    History of tonsillectomy      FAMILY HISTORY:  Family history of diabetes mellitus (DM)        SOCIAL HISTORY:  Tobacco Use:  EtOH use:   Substance:    Allergies    No Known Allergies    Intolerances        REVIEW OF SYSTEMS  Negative except as noted in HPI  CONSTITUTIONAL: No fever, weight loss, or fatigue  EYES: No eye pain, visual disturbances, or discharge  ENMT:  No difficulty hearing, tinnitus, vertigo; No sinus or throat pain  NECK: No pain or stiffness  BREASTS: No pain, masses, or nipple discharge  RESPIRATORY: No cough, wheezing, chills or hemoptysis; No shortness of breath  CARDIOVASCULAR: No chest pain, palpitations, dizziness, or leg swelling  GASTROINTESTINAL: No abdominal or epigastric pain. No nausea, vomiting, or hematemesis; No diarrhea or constipation. No melena or hematochezia.  GENITOURINARY: No dysuria, frequency, hematuria, or incontinence  NEUROLOGICAL: No headaches, memory loss, loss of strength, numbness, or tremors  SKIN: No itching, burning, rashes, or lesions   LYMPH NODES: No enlarged glands  ENDOCRINE: No heat or cold intolerance; No hair loss  MUSCULOSKELETAL: No joint pain or swelling; No muscle, back, or extremity pain  PSYCHIATRIC: No depression, anxiety, mood swings, or difficulty sleeping  HEME/LYMPH: No easy bruising, or bleeding gums  ALLERY AND IMMUNOLOGIC: No hives or eczema    HOME MEDICATIONS:  Home Medications:      MEDICATIONS:  Antibiotics:    Neuro:  cyclobenzaprine 10 milliGRAM(s) Oral <User Schedule> PRN  diazepam    Tablet 5 milliGRAM(s) Oral <User Schedule>    Anticoagulation:  heparin SubCutaneous Injection - Peds 5000 Unit(s) SubCutaneous every 12 hours    OTHER:  acetaZOLAMIDE    Tablet 500 milliGRAM(s) Oral two times a day  pantoprazole    Tablet 40 milliGRAM(s) Oral before breakfast    IVF:      Vital Signs Last 24 Hrs  T(C): 36.7 (02 Oct 2020 10:37), Max: 36.7 (02 Oct 2020 10:37)  T(F): 98 (02 Oct 2020 10:37), Max: 98 (02 Oct 2020 10:37)  HR: 67 (02 Oct 2020 10:37) (67 - 80)  BP: 134/63 (02 Oct 2020 10:37) (134/63 - 137/77)  BP(mean): --  RR: 20 (02 Oct 2020 10:37) (20 - 20)  SpO2: 98% (02 Oct 2020 10:37) (97% - 98%)      PHYSICAL EXAM:  GENERAL: NAD, well-groomed, well-developed  HEAD:  Atraumatic, normocephalic  EYES: Conjunctiva and sclera clear; corneal reflex intact  ENMT: No tonsillar erythema, exudates, or enlargement; moist mucous membranes, good dentition,   MENTAL STATUS: AAO x3; Opens eyes spontaneously; Appropriately conversant without aphasia; following simple commands  CRANIAL NERVES: Visual acuity normal for age, visual fields full to confrontation,   PERRL. EOMI without nystagmus.  Facial sensation intact V1-3 distribution b/l.   Face symmetric w/ normal eye closure and smile, tongue midline.   Hearing grossly intact. Speech clear.   Head turning and shoulder shrug intact.   REFLEXES: PERRL. Corneals intact b/l. Gag intact. Cough intact.   MOTOR: strength 5/5 b/l upper and lower extremities  SENSATION: grossly intact to light touch all extremities  COORDINATION:  no upper extremity dysmetria  No pronator drift  MUSCLE STRETCH REFLEXES: DTRs 2+ intact and symmetric  SKIN: Warm, dry; no rashes or lesions  Wound - patient deferred evaluation of wound 2/2 to pain    LABS:                        15.1   11.40 )-----------( 271      ( 02 Oct 2020 07:13 )             46.0     10-02    141  |  105  |  20  ----------------------------<  129<H>  4.4   |  24  |  1.0    Ca    9.7      02 Oct 2020 07:13  Mg     2.1     10-02    TPro  7.0  /  Alb  4.3  /  TBili  0.7  /  DBili  x   /  AST  33  /  ALT  58<H>  /  AlkPhos  49  10-02    PT/INR - ( 02 Oct 2020 07:13 )   PT: 12.80 sec;   INR: 1.11 ratio         PTT - ( 02 Oct 2020 07:13 )  PTT:26.7 sec      CULTURES:      RADIOLOGY & ADDITIONAL STUDIES:  pending

## 2020-10-02 NOTE — ED PROVIDER NOTE - PROGRESS NOTE DETAILS
MIT[facility] - spoke to Neurosurgery, [facility].  Per Dr. Reyes, she recommends admission to her service, labs, CT Head, Lumbar Spine, etc. MITUL - spoke to Neurosurgery, [facility].  Per Dr. Reyes, she recommends admission to her service, labs, CT Head, Lumbar Spine, etc.  Will admit to Surgery under Dr. Candice Reyes

## 2020-10-02 NOTE — ED PEDIATRIC NURSE NOTE - OBJECTIVE STATEMENT
Pt presents to ED from home s/p spinal fusion last week, now c/o severe headache, lower back pain, & vomiting x2 days. As per mom, pt is very unsteady on his feet.

## 2020-10-02 NOTE — ED PROVIDER NOTE - OBJECTIVE STATEMENT
This is a 19 year old male with a h/o chronic sports-related vertebral degenerative changes, s/p spinal fusion 7 days ago (9/25), who was sent by his neurosurgeon to the ED for evaluation of progressively worsening positional diffuse headache, nausea with emesis, and back pain after being discharged 4 days ago. As per mother at the bedside, pt has been having headache escalating from 0-20 upon standing up, and has had 6-7 episodes of vomiting over the past 2 days. He also notes associated photophobia. Given persistent symptoms and inability to sleep due to pain, Dr. Candice Reyes (neurosurgery) requested them to be admitted to pain management and imaging. Medications were changed from Percocet to Fioricet and Diazepam for symptomatic control yesterday morning. Otherwise, he denies vision changes, stool/urine incontinence or retention, weakness, or other sx and complaints including fever, chills, chest pain, dyspnea. This is a 19 year old male with a h/o chronic sports-related vertebral degenerative changes, s/p spinal fusion 7 days ago (9/25/20), who was sent by his neurosurgeon to the ED for evaluation of progressively worsening positional diffuse headache, nausea with emesis, and back pain after being discharged 4 days ago. As per mother at the bedside, pt has been having headache escalating from 0-20 upon standing up, and has had 6-7 episodes of vomiting over the past 2 days. He also notes associated photophobia. Given persistent symptoms and inability to sleep due to pain, Dr. Candice Reyes (neurosurgery) requested them to be admitted to pain management and imaging. He has been on Diazepam, Percocet, Robaxin, Acetazolamide, Senna, Protonix.  Medications were changed from Percocet to Fioricet for symptomatic control yesterday morning. Last pain medication taken was 11 PM last night, Diazepam.  Urinated this AM and able to defecate.  Otherwise, he denies vision changes, stool/urine incontinence or retention, weakness, focal deficits, or other sx and complaints including fever, chills, diarrhea, chest pain, dyspnea.

## 2020-10-02 NOTE — H&P ADULT - ATTENDING COMMENTS
The patient was seen and examined at bedside.  Patient had called early this morning, with mother reporting patient had severe symptoms of position related headaches.  Patient advised to proceed to the emergency room.    On examination, patient is awake, alert, and in no significant distress.  Motor examination in the lower extremities is full and at baseline.    Bilateral paraspinal incisions are clean, dry, without any evidence of tenderness, or leakage.    CT of the head demonstrates no abnormal findings.  CT of the lumbar spine was reviewed by me.  I'll, there is evidence of expected subcutaneous, and subfascial edema, without obvious evidence of fluid collection.  Hardware is in satisfactory position.    Extensive discussions were held with the patient and his mother.  We discussed management possibilities, which include, possible blood Patch versus proceeding to the operating room for exploration and repair of CSF leak.  Discussion was held with neuroradiology and anesthesiology.  Unfortunately, blood patch neuroradiology will not be able to be performed until next week.  Upon inquiring, anesthesiology does not perform blood patches on postoperative spine patients.    Given this, I recommended to the patient's and family that he would benefit from proceeding to the operating room for exploration of lumbar wound with repair of CSF leak plus/minus lumbar drain placement.  Patient understands, and is agreeable to proceed.  Patient will be prepped for surgery for tomorrow morning.

## 2020-10-02 NOTE — ED PEDIATRIC NURSE NOTE - LOW RISK FALLS INTERVENTIONS (SCORE 7-11)
Call light is within reach, educate patient/family on its functionality/Patient and family education available to parents and patient/Environment clear of unused equipment, furniture's in place, clear of hazards/Assess eliminations need, assist as needed/Use of non-skid footwear for ambulating patients, use of appropriate size clothing to prevent risk of tripping/Bed in low position, brakes on/Orientation to room/Side rails x 2 or 4 up, assess large gaps, such that a patient could get extremity or other body part entrapped, use additional safety procedures

## 2020-10-03 LAB
ALBUMIN SERPL ELPH-MCNC: 3.9 G/DL — SIGNIFICANT CHANGE UP (ref 3.5–5.2)
ALP SERPL-CCNC: 59 U/L — SIGNIFICANT CHANGE UP (ref 30–115)
ALT FLD-CCNC: 47 U/L — HIGH (ref 13–38)
ANION GAP SERPL CALC-SCNC: 13 MMOL/L — SIGNIFICANT CHANGE UP (ref 7–14)
APTT BLD: 30.7 SEC — SIGNIFICANT CHANGE UP (ref 27–39.2)
AST SERPL-CCNC: 26 U/L — SIGNIFICANT CHANGE UP (ref 13–38)
BASOPHILS # BLD AUTO: 0.04 K/UL — SIGNIFICANT CHANGE UP (ref 0–0.2)
BASOPHILS NFR BLD AUTO: 0.4 % — SIGNIFICANT CHANGE UP (ref 0–1)
BILIRUB SERPL-MCNC: 0.5 MG/DL — SIGNIFICANT CHANGE UP (ref 0.2–1.2)
BUN SERPL-MCNC: 27 MG/DL — HIGH (ref 10–20)
CALCIUM SERPL-MCNC: 9.2 MG/DL — SIGNIFICANT CHANGE UP (ref 8.5–10.1)
CHLORIDE SERPL-SCNC: 108 MMOL/L — SIGNIFICANT CHANGE UP (ref 98–110)
CO2 SERPL-SCNC: 17 MMOL/L — SIGNIFICANT CHANGE UP (ref 17–32)
CREAT SERPL-MCNC: 1.3 MG/DL — HIGH (ref 0.3–1)
EOSINOPHIL # BLD AUTO: 0.08 K/UL — SIGNIFICANT CHANGE UP (ref 0–0.7)
EOSINOPHIL NFR BLD AUTO: 0.7 % — SIGNIFICANT CHANGE UP (ref 0–8)
GLUCOSE SERPL-MCNC: 158 MG/DL — HIGH (ref 70–99)
HCT VFR BLD CALC: 46 % — SIGNIFICANT CHANGE UP (ref 42–52)
HGB BLD-MCNC: 15.2 G/DL — SIGNIFICANT CHANGE UP (ref 14–18)
IMM GRANULOCYTES NFR BLD AUTO: 1 % — HIGH (ref 0.1–0.3)
INR BLD: 1.16 RATIO — SIGNIFICANT CHANGE UP (ref 0.65–1.3)
LYMPHOCYTES # BLD AUTO: 0.94 K/UL — LOW (ref 1.2–3.4)
LYMPHOCYTES # BLD AUTO: 8.6 % — LOW (ref 20.5–51.1)
MAGNESIUM SERPL-MCNC: 2.2 MG/DL — SIGNIFICANT CHANGE UP (ref 1.8–2.4)
MCHC RBC-ENTMCNC: 30 PG — SIGNIFICANT CHANGE UP (ref 27–31)
MCHC RBC-ENTMCNC: 33 G/DL — SIGNIFICANT CHANGE UP (ref 32–37)
MCV RBC AUTO: 90.9 FL — SIGNIFICANT CHANGE UP (ref 80–94)
MONOCYTES # BLD AUTO: 0.14 K/UL — SIGNIFICANT CHANGE UP (ref 0.1–0.6)
MONOCYTES NFR BLD AUTO: 1.3 % — LOW (ref 1.7–9.3)
NEUTROPHILS # BLD AUTO: 9.6 K/UL — HIGH (ref 1.4–6.5)
NEUTROPHILS NFR BLD AUTO: 88 % — HIGH (ref 42.2–75.2)
NRBC # BLD: 0 /100 WBCS — SIGNIFICANT CHANGE UP (ref 0–0)
PHOSPHATE SERPL-MCNC: 5.9 MG/DL — HIGH (ref 2.1–4.9)
PLATELET # BLD AUTO: 284 K/UL — SIGNIFICANT CHANGE UP (ref 130–400)
POTASSIUM SERPL-MCNC: 5 MMOL/L — SIGNIFICANT CHANGE UP (ref 3.5–5)
POTASSIUM SERPL-SCNC: 5 MMOL/L — SIGNIFICANT CHANGE UP (ref 3.5–5)
PROT SERPL-MCNC: 6.7 G/DL — SIGNIFICANT CHANGE UP (ref 6.1–8)
PROTHROM AB SERPL-ACNC: 13.3 SEC — HIGH (ref 9.95–12.87)
RBC # BLD: 5.06 M/UL — SIGNIFICANT CHANGE UP (ref 4.7–6.1)
RBC # FLD: 12.9 % — SIGNIFICANT CHANGE UP (ref 11.5–14.5)
SARS-COV-2 RNA SPEC QL NAA+PROBE: SIGNIFICANT CHANGE UP
SODIUM SERPL-SCNC: 138 MMOL/L — SIGNIFICANT CHANGE UP (ref 135–146)
WBC # BLD: 10.91 K/UL — HIGH (ref 4.8–10.8)
WBC # FLD AUTO: 10.91 K/UL — HIGH (ref 4.8–10.8)

## 2020-10-03 PROCEDURE — 63707 REPAIR SPINAL FLUID LEAKAGE: CPT | Mod: AS,78

## 2020-10-03 PROCEDURE — 99223 1ST HOSP IP/OBS HIGH 75: CPT

## 2020-10-03 PROCEDURE — 93010 ELECTROCARDIOGRAM REPORT: CPT

## 2020-10-03 RX ORDER — POLYETHYLENE GLYCOL 3350 17 G/17G
17 POWDER, FOR SOLUTION ORAL DAILY
Refills: 0 | Status: DISCONTINUED | OUTPATIENT
Start: 2020-10-03 | End: 2020-10-12

## 2020-10-03 RX ORDER — CEFAZOLIN SODIUM 1 G
1000 VIAL (EA) INJECTION EVERY 8 HOURS
Refills: 0 | Status: DISCONTINUED | OUTPATIENT
Start: 2020-10-03 | End: 2020-10-11

## 2020-10-03 RX ORDER — ONDANSETRON 8 MG/1
4 TABLET, FILM COATED ORAL EVERY 6 HOURS
Refills: 0 | Status: DISCONTINUED | OUTPATIENT
Start: 2020-10-03 | End: 2020-10-04

## 2020-10-03 RX ORDER — PANTOPRAZOLE SODIUM 20 MG/1
40 TABLET, DELAYED RELEASE ORAL
Refills: 0 | Status: DISCONTINUED | OUTPATIENT
Start: 2020-10-03 | End: 2020-10-12

## 2020-10-03 RX ORDER — DIAZEPAM 5 MG
5 TABLET ORAL
Refills: 0 | Status: DISCONTINUED | OUTPATIENT
Start: 2020-10-03 | End: 2020-10-10

## 2020-10-03 RX ORDER — LANOLIN ALCOHOL/MO/W.PET/CERES
5 CREAM (GRAM) TOPICAL AT BEDTIME
Refills: 0 | Status: DISCONTINUED | OUTPATIENT
Start: 2020-10-03 | End: 2020-10-12

## 2020-10-03 RX ORDER — ACETAMINOPHEN 500 MG
1000 TABLET ORAL ONCE
Refills: 0 | Status: COMPLETED | OUTPATIENT
Start: 2020-10-03 | End: 2020-10-03

## 2020-10-03 RX ORDER — SODIUM CHLORIDE 9 MG/ML
500 INJECTION INTRAMUSCULAR; INTRAVENOUS; SUBCUTANEOUS ONCE
Refills: 0 | Status: COMPLETED | OUTPATIENT
Start: 2020-10-03 | End: 2020-10-03

## 2020-10-03 RX ORDER — HEPARIN SODIUM 5000 [USP'U]/ML
7500 INJECTION INTRAVENOUS; SUBCUTANEOUS EVERY 8 HOURS
Refills: 0 | Status: DISCONTINUED | OUTPATIENT
Start: 2020-10-03 | End: 2020-10-03

## 2020-10-03 RX ORDER — ONDANSETRON 8 MG/1
4 TABLET, FILM COATED ORAL ONCE
Refills: 0 | Status: COMPLETED | OUTPATIENT
Start: 2020-10-03 | End: 2020-10-03

## 2020-10-03 RX ORDER — ACETAMINOPHEN 500 MG
650 TABLET ORAL ONCE
Refills: 0 | Status: COMPLETED | OUTPATIENT
Start: 2020-10-03 | End: 2020-10-03

## 2020-10-03 RX ORDER — SODIUM CHLORIDE 9 MG/ML
1000 INJECTION, SOLUTION INTRAVENOUS
Refills: 0 | Status: DISCONTINUED | OUTPATIENT
Start: 2020-10-03 | End: 2020-10-04

## 2020-10-03 RX ORDER — INFLUENZA VIRUS VACCINE 15; 15; 15; 15 UG/.5ML; UG/.5ML; UG/.5ML; UG/.5ML
0.5 SUSPENSION INTRAMUSCULAR ONCE
Refills: 0 | Status: COMPLETED | OUTPATIENT
Start: 2020-10-03 | End: 2020-10-03

## 2020-10-03 RX ORDER — CYCLOBENZAPRINE HYDROCHLORIDE 10 MG/1
10 TABLET, FILM COATED ORAL
Refills: 0 | Status: DISCONTINUED | OUTPATIENT
Start: 2020-10-03 | End: 2020-10-12

## 2020-10-03 RX ORDER — OXYCODONE HYDROCHLORIDE 5 MG/1
5 TABLET ORAL EVERY 6 HOURS
Refills: 0 | Status: DISCONTINUED | OUTPATIENT
Start: 2020-10-03 | End: 2020-10-07

## 2020-10-03 RX ORDER — ACETAMINOPHEN 500 MG
650 TABLET ORAL ONCE
Refills: 0 | Status: DISCONTINUED | OUTPATIENT
Start: 2020-10-03 | End: 2020-10-03

## 2020-10-03 RX ORDER — HYDROMORPHONE HYDROCHLORIDE 2 MG/ML
1 INJECTION INTRAMUSCULAR; INTRAVENOUS; SUBCUTANEOUS
Refills: 0 | Status: DISCONTINUED | OUTPATIENT
Start: 2020-10-03 | End: 2020-10-03

## 2020-10-03 RX ORDER — SODIUM CHLORIDE 9 MG/ML
1000 INJECTION, SOLUTION INTRAVENOUS
Refills: 0 | Status: DISCONTINUED | OUTPATIENT
Start: 2020-10-03 | End: 2020-10-03

## 2020-10-03 RX ORDER — OXYCODONE AND ACETAMINOPHEN 5; 325 MG/1; MG/1
2 TABLET ORAL EVERY 6 HOURS
Refills: 0 | Status: DISCONTINUED | OUTPATIENT
Start: 2020-10-03 | End: 2020-10-03

## 2020-10-03 RX ORDER — SENNA PLUS 8.6 MG/1
2 TABLET ORAL AT BEDTIME
Refills: 0 | Status: DISCONTINUED | OUTPATIENT
Start: 2020-10-03 | End: 2020-10-06

## 2020-10-03 RX ORDER — HEPARIN SODIUM 5000 [USP'U]/ML
7500 INJECTION INTRAVENOUS; SUBCUTANEOUS EVERY 8 HOURS
Refills: 0 | Status: DISCONTINUED | OUTPATIENT
Start: 2020-10-03 | End: 2020-10-12

## 2020-10-03 RX ORDER — OXYCODONE HYDROCHLORIDE 5 MG/1
10 TABLET ORAL EVERY 6 HOURS
Refills: 0 | Status: DISCONTINUED | OUTPATIENT
Start: 2020-10-03 | End: 2020-10-07

## 2020-10-03 RX ORDER — ACETAMINOPHEN 500 MG
650 TABLET ORAL EVERY 6 HOURS
Refills: 0 | Status: DISCONTINUED | OUTPATIENT
Start: 2020-10-03 | End: 2020-10-06

## 2020-10-03 RX ADMIN — Medication 5 MILLIGRAM(S): at 21:27

## 2020-10-03 RX ADMIN — Medication 650 MILLIGRAM(S): at 02:30

## 2020-10-03 RX ADMIN — OXYCODONE HYDROCHLORIDE 5 MILLIGRAM(S): 5 TABLET ORAL at 23:39

## 2020-10-03 RX ADMIN — PANTOPRAZOLE SODIUM 40 MILLIGRAM(S): 20 TABLET, DELAYED RELEASE ORAL at 14:23

## 2020-10-03 RX ADMIN — Medication 1000 MILLIGRAM(S): at 16:49

## 2020-10-03 RX ADMIN — OXYCODONE HYDROCHLORIDE 5 MILLIGRAM(S): 5 TABLET ORAL at 17:39

## 2020-10-03 RX ADMIN — Medication 100 MILLIGRAM(S): at 14:43

## 2020-10-03 RX ADMIN — HYDROMORPHONE HYDROCHLORIDE 1 MILLIGRAM(S): 2 INJECTION INTRAMUSCULAR; INTRAVENOUS; SUBCUTANEOUS at 13:27

## 2020-10-03 RX ADMIN — Medication 650 MILLIGRAM(S): at 23:18

## 2020-10-03 RX ADMIN — SODIUM CHLORIDE 3000 MILLILITER(S): 9 INJECTION INTRAMUSCULAR; INTRAVENOUS; SUBCUTANEOUS at 20:43

## 2020-10-03 RX ADMIN — CYCLOBENZAPRINE HYDROCHLORIDE 10 MILLIGRAM(S): 10 TABLET, FILM COATED ORAL at 04:29

## 2020-10-03 RX ADMIN — HEPARIN SODIUM 5000 UNIT(S): 5000 INJECTION INTRAVENOUS; SUBCUTANEOUS at 21:29

## 2020-10-03 RX ADMIN — HEPARIN SODIUM 7500 UNIT(S): 5000 INJECTION INTRAVENOUS; SUBCUTANEOUS at 14:11

## 2020-10-03 RX ADMIN — ONDANSETRON 4 MILLIGRAM(S): 8 TABLET, FILM COATED ORAL at 12:35

## 2020-10-03 RX ADMIN — Medication 5 MILLIGRAM(S): at 17:27

## 2020-10-03 RX ADMIN — SODIUM CHLORIDE 100 MILLILITER(S): 9 INJECTION, SOLUTION INTRAVENOUS at 17:27

## 2020-10-03 RX ADMIN — HYDROMORPHONE HYDROCHLORIDE 1 MILLIGRAM(S): 2 INJECTION INTRAMUSCULAR; INTRAVENOUS; SUBCUTANEOUS at 12:57

## 2020-10-03 RX ADMIN — OXYCODONE HYDROCHLORIDE 5 MILLIGRAM(S): 5 TABLET ORAL at 18:30

## 2020-10-03 RX ADMIN — Medication 650 MILLIGRAM(S): at 04:05

## 2020-10-03 RX ADMIN — Medication 5 MILLIGRAM(S): at 23:15

## 2020-10-03 RX ADMIN — SODIUM CHLORIDE 75 MILLILITER(S): 9 INJECTION, SOLUTION INTRAVENOUS at 14:09

## 2020-10-03 RX ADMIN — Medication 400 MILLIGRAM(S): at 16:35

## 2020-10-03 RX ADMIN — Medication 1 TABLET(S): at 14:23

## 2020-10-03 RX ADMIN — Medication 100 MILLIGRAM(S): at 21:26

## 2020-10-03 NOTE — CONSULT NOTE ADULT - SUBJECTIVE AND OBJECTIVE BOX
SICU Consultation Note  =====================================================  HPI: 19y Male  HPI:  Patient is a 19y old  Male who presents with a chief complaint of headache    HISTORY OF PRESENT ILLNESS:   19 year old male with a h/o chronic sports-related vertebral degenerative changes, s/p L5-S1 TLIF with repaired dural leak with Dr. Reyes on 9/25/20, who was sent by his neurosurgeon to the ED for evaluation of progressively worsening positional diffuse headache, nausea with emesis, and back pain after being discharged 4 days ago. As per mother at the bedside, pt has been having headache escalating from 0-20 upon standing up, and has had 6-7 episodes of vomiting over the past 2 days. He also notes associated photophobia. Pt states symptoms are persistent and is unable to sleep due to pain.   He has been on Diazepam, Percocet, Robaxin, Acetazolamide, Senna, Protonix.  Medications were changed from Percocet to Fioricet for symptomatic control yesterday morning. Last pain medication taken was 11 PM last night, Diazepam.  Urinated this AM and able to defecate.  Otherwise, he denies vision changes, stool/urine incontinence or retention, weakness, focal deficits, or other sx and complaints including fever, chills, diarrhea, chest pain, dyspnea.  Denies saddle anesthesia, paresthesias.       PAST MEDICAL & SURGICAL HISTORY:  Anxiety    S/P ACL repair  x2    History of tonsillectomy      FAMILY HISTORY:  Family history of diabetes mellitus (DM)        SOCIAL HISTORY:  Tobacco Use:  EtOH use:   Substance:    Allergies    No Known Allergies    Intolerances        REVIEW OF SYSTEMS  Negative except as noted in HPI  CONSTITUTIONAL: No fever, weight loss, or fatigue  EYES: No eye pain, visual disturbances, or discharge  ENMT:  No difficulty hearing, tinnitus, vertigo; No sinus or throat pain  NECK: No pain or stiffness  BREASTS: No pain, masses, or nipple discharge  RESPIRATORY: No cough, wheezing, chills or hemoptysis; No shortness of breath  CARDIOVASCULAR: No chest pain, palpitations, dizziness, or leg swelling  GASTROINTESTINAL: No abdominal or epigastric pain. No nausea, vomiting, or hematemesis; No diarrhea or constipation. No melena or hematochezia.  GENITOURINARY: No dysuria, frequency, hematuria, or incontinence  NEUROLOGICAL: No headaches, memory loss, loss of strength, numbness, or tremors  SKIN: No itching, burning, rashes, or lesions   LYMPH NODES: No enlarged glands  ENDOCRINE: No heat or cold intolerance; No hair loss  MUSCULOSKELETAL: No joint pain or swelling; No muscle, back, or extremity pain  PSYCHIATRIC: No depression, anxiety, mood swings, or difficulty sleeping  HEME/LYMPH: No easy bruising, or bleeding gums  ALLERY AND IMMUNOLOGIC: No hives or eczema    HOME MEDICATIONS:  Home Medications:      MEDICATIONS:  Antibiotics:    Neuro:  cyclobenzaprine 10 milliGRAM(s) Oral <User Schedule> PRN  diazepam    Tablet 5 milliGRAM(s) Oral <User Schedule>    Anticoagulation:  heparin SubCutaneous Injection - Peds 5000 Unit(s) SubCutaneous every 12 hours    OTHER:  acetaZOLAMIDE    Tablet 500 milliGRAM(s) Oral two times a day  pantoprazole    Tablet 40 milliGRAM(s) Oral before breakfast    IVF:      Vital Signs Last 24 Hrs  T(C): 36.7 (02 Oct 2020 10:37), Max: 36.7 (02 Oct 2020 10:37)  T(F): 98 (02 Oct 2020 10:37), Max: 98 (02 Oct 2020 10:37)  HR: 67 (02 Oct 2020 10:37) (67 - 80)  BP: 134/63 (02 Oct 2020 10:37) (134/63 - 137/77)  BP(mean): --  RR: 20 (02 Oct 2020 10:37) (20 - 20)  SpO2: 98% (02 Oct 2020 10:37) (97% - 98%)      PHYSICAL EXAM:  GENERAL: NAD, well-groomed, well-developed  HEAD:  Atraumatic, normocephalic  EYES: Conjunctiva and sclera clear; corneal reflex intact  ENMT: No tonsillar erythema, exudates, or enlargement; moist mucous membranes, good dentition,   MENTAL STATUS: AAO x3; Opens eyes spontaneously; Appropriately conversant without aphasia; following simple commands  CRANIAL NERVES: Visual acuity normal for age, visual fields full to confrontation,   PERRL. EOMI without nystagmus.  Facial sensation intact V1-3 distribution b/l.   Face symmetric w/ normal eye closure and smile, tongue midline.   Hearing grossly intact. Speech clear.   Head turning and shoulder shrug intact.   REFLEXES: PERRL. Corneals intact b/l. Gag intact. Cough intact.   MOTOR: strength 5/5 b/l upper and lower extremities  SENSATION: grossly intact to light touch all extremities  COORDINATION:  no upper extremity dysmetria  No pronator drift  MUSCLE STRETCH REFLEXES: DTRs 2+ intact and symmetric  SKIN: Warm, dry; no rashes or lesions  Wound - patient deferred evaluation of wound 2/2 to pain    LABS:                        15.1   11.40 )-----------( 271      ( 02 Oct 2020 07:13 )             46.0     10-02    141  |  105  |  20  ----------------------------<  129<H>  4.4   |  24  |  1.0    Ca    9.7      02 Oct 2020 07:13  Mg     2.1     10-02    TPro  7.0  /  Alb  4.3  /  TBili  0.7  /  DBili  x   /  AST  33  /  ALT  58<H>  /  AlkPhos  49  10-02    PT/INR - ( 02 Oct 2020 07:13 )   PT: 12.80 sec;   INR: 1.11 ratio         PTT - ( 02 Oct 2020 07:13 )  PTT:26.7 sec      CULTURES:      RADIOLOGY & ADDITIONAL STUDIES:  pending   (02 Oct 2020 10:49)      Surgery Information  OR time:      EBL:          IV Fluids:       Blood Products:   UOP:          PAST MEDICAL & SURGICAL HISTORY:  Anxiety  S/P ACL repair x2  History of tonsillectomy    Home Meds: Home Medications: None    Allergies:   No Known Allergies    Soc:   Advanced Directives: Presumed Full Code     ROS:    REVIEW OF SYSTEMS    [ ] A ten-point review of systems was otherwise negative except as noted.  [ ] Due to altered mental status/intubation, subjective information were not able to be obtained from the patient. History was obtained, to the extent possible, from review of the chart and collateral sources of information.      CURRENT MEDICATIONS:   --------------------------------------------------------------------------------------  Neurologic Medications  acetaminophen   Tablet .. 650 milliGRAM(s) Oral once PRN Mild Pain (1 - 3), Moderate Pain (4 - 6)  acetaminophen  IVPB .. 1000 milliGRAM(s) IV Intermittent once  cyclobenzaprine 10 milliGRAM(s) Oral <User Schedule> PRN Muscle Spasm  diazepam    Tablet 5 milliGRAM(s) Oral <User Schedule>  HYDROmorphone  Injectable 1 milliGRAM(s) IV Push every 10 minutes PRN Severe Pain (7 - 10)  melatonin 5 milliGRAM(s) Oral at bedtime  oxycodone    5 mG/acetaminophen 325 mG 2 Tablet(s) Oral every 6 hours PRN Moderate Pain (4 - 6)    Gastrointestinal Medications  bisacodyl 5 milliGRAM(s) Oral at bedtime  lactated ringers. 1000 milliLiter(s) IV Continuous <Continuous>  pantoprazole    Tablet 40 milliGRAM(s) Oral before breakfast  polyethylene glycol 3350 17 Gram(s) Oral daily  senna 2 Tablet(s) Oral at bedtime PRN Constipation      Hematologic/Oncologic Medications  influenza   Vaccine 0.5 milliLiter(s) IntraMuscular once      --------------------------------------------------------------------------------------    VITAL SIGNS, INS/OUTS (last 24 hours):  --------------------------------------------------------------------------------------  ICU Vital Signs Last 24 Hrs  T(C): 36.6 (03 Oct 2020 07:52), Max: 37.2 (02 Oct 2020 23:13)  T(F): 97.8 (03 Oct 2020 07:33), Max: 98.9 (02 Oct 2020 23:13)  HR: 93 (03 Oct 2020 07:52) (81 - 93)  BP: 137/93 (03 Oct 2020 07:52) (137/93 - 147/82)  BP(mean): 99 (02 Oct 2020 23:13) (99 - 99)  ABP: --  ABP(mean): --  RR: 18 (03 Oct 2020 07:52) (15 - 18)  SpO2: 100% (03 Oct 2020 07:52) (98% - 100%)    I&O's Summary    --------------------------------------------------------------------------------------    EXAM:  General/Neuro  RASS:   GCS:   Exam: Normal, NAD, alert, oriented x 3, no focal deficits. PERRLA  ***    Respiratory  Exam: Lungs clear to auscultation, Normal expansion/effort.  ***  [] Tracheostomy   [] Intubated  Mechanical Ventilation:     Cardiovascular  Exam: S1, S2.  Regular rate and rhythm.  Peripheral edema  ***  Cardiac Rhythm: Normal Sinus Rhythm  ECHO:     GI  Exam: Abdomen soft, Non-tender, Non-distended.  Gastrostomy / Jejunostomy tube in place.  Nasogastric tube in place.  Colostomy / Ileostomy.  ***  Wound:   ***  Current Diet:  NPO***      Tubes/Lines/Drains  ***  [x] Peripheral IV  [] Central Venous Line     	[] R	[] L	[] IJ	[] Fem	[] SC        Type:	    Date Placed:   [] Arterial Line		[] R	[] L	[] Fem	[] Rad	[] Ax	Date Placed:   [] PICC:         	[] Midline		[] Mediport           [] Urinary Catheter		Date Placed:     Extremities  Exam: Extremities warm, pink, well-perfused.        Derm:  Exam: Good skin turgor, no skin breakdown.      :   Exam: Shirley catheter in place.     LABS  --------------------------------------------------------------------------------------  Labs:  CAPILLARY BLOOD GLUCOSE                              15.1   11.40 )-----------( 271      ( 02 Oct 2020 07:13 )             46.0         10-02    141  |  105  |  20  ----------------------------<  129<H>  4.4   |  24  |  1.0          LFTs:             7.0  | 0.7  | 33       ------------------[49      ( 02 Oct 2020 07:13 )  4.3  | x    | 58          Lipase:x      Amylase:x             Coags:     12.80  ----< 1.11    ( 02 Oct 2020 07:13 )     26.7                      --------------------------------------------------------------------------------------    OTHER LABS    IMAGING RESULTS      ASSESSMENT:  19y Male ***    PLAN:   Neurologic:   Respiratory:   Cardiovascular:   Gastrointestinal/Nutrition:   Renal/Genitourinary:   Hematologic:   Infectious Disease:   Lines/Tubes:  Endocrine:   Disposition:     --------------------------------------------------------------------------------------    Critical Care Diagnoses:     SICU Consultation Note  =====================================================  HPI: 19y Male  Patient is a 19y old  Male who presents with a chief complaint of headache    Indication for ICU admission:  Spinal drain  Admit Date:  10/2  ICU Date:      10/3  OR Date:      10/3    No Known Allergies    PAST MEDICAL & SURGICAL HISTORY:  Anxiety  S/P ACL repair  x2  History of tonsillectomy  Home Medications:         HISTORY OF PRESENT ILLNESS:   19 year old male with a h/o chronic sports-related vertebral degenerative changes, s/p L5-S1 TLIF with repaired dural leak with Dr. Ryees on 9/25/20, who sent patient to the  ED for evaluation of progressively worsening positional diffuse headache, nausea with emesis, and back pain after being discharged 4 days ago.  He also notes associated photophobia. Pt states symptoms are persistent and is unable to sleep due to pain.   He has been on Diazepam, Percocet, Robaxin, Acetazolamide, Senna, Protonix.  Medications were changed from Percocet to Fioricet for symptomatic control yesterday morning. Last pain medication taken was 11 PM last night, Diazepam.  Urinated this AM and able to defecate.  Otherwise, he denies vision changes, stool/urine incontinence or retention, weakness, focal deficits, or other sx and complaints including fever, chills, diarrhea, chest pain, dyspnea.  Denies saddle anesthesia, paresthesias.                 < from: CT Lumbar Spine No Cont (10.02.20 @ 17:15) >  1.  Patient is status post L4-S1 posterior germain and screw fixation, with a disc spacer at L5-S1. No evidence of hardware complication.    2.  Bilateral dorsal subcutaneous fluid collection centered at about L3/L4, measuring up to 5.5 cm on the right side and 8.5 cm on the left side. Additional edema/ill-defined fluid collection within the left dorsal paraspinal musculature measuring up to 7 cm. Since the clinical concern is for CSF leak, a CSF leak is not excluded. Consider MRI for better delineation as clinically warranted.    3.  L5-S1- grade II spondylolisthesis on the basis of bilateral L5 pars interarticularis defects. There is likely severe bilateral neural foraminal stenosis (limited in evaluation due to streak artifact from the hardware).    4.  L4-5- grade I retrolisthesis with likely mild bilateral neural foraminal stenosis.    This morning, the patient was taken urgently for repair of CSF leak  OR Time GETA 3.5 hours  IVF 1100 ml  EBL 50 ml  NO murray  Pt required intubation with glidescope for both operative procedures  He was successfully extubated and transferred to PACU in stable condition    ICU is consulted for neurologic monitoring and spinal drain    Daily Height in cm: 198.12 (03 Oct 2020 07:52)        Diet, Regular (10-03-20 @ 12:55)      CURRENT MEDS:  Neurologic Medications  acetaminophen   Tablet .. 650 milliGRAM(s) Oral every 6 hours  acetaminophen  IVPB .. 1000 milliGRAM(s) IV Intermittent once  cyclobenzaprine 10 milliGRAM(s) Oral <User Schedule> PRN Muscle Spasm  diazepam    Tablet 5 milliGRAM(s) Oral <User Schedule>  melatonin 5 milliGRAM(s) Oral at bedtime  ondansetron Injectable 4 milliGRAM(s) IV Push every 6 hours PRN Nausea and/or Vomiting  oxyCODONE    IR 5 milliGRAM(s) Oral every 6 hours PRN Moderate Pain (4 - 6)  oxyCODONE    IR 10 milliGRAM(s) Oral every 6 hours PRN Severe Pain (7 - 10)    Gastrointestinal Medications  bisacodyl 5 milliGRAM(s) Oral at bedtime  dextrose 5% + sodium chloride 0.45%. 1000 milliLiter(s) IV Continuous <Continuous>  multivitamin 1 Tablet(s) Oral daily  pantoprazole    Tablet 40 milliGRAM(s) Oral before breakfast  polyethylene glycol 3350 17 Gram(s) Oral daily  senna 2 Tablet(s) Oral at bedtime PRN Constipation    Hematologic/Oncologic Medications  heparin   Injectable 7500 Unit(s) SubCutaneous every 8 hours  influenza   Vaccine 0.5 milliLiter(s) IntraMuscular once    Antimicrobial/Immunologic Medications  ceFAZolin   IVPB 1000 milliGRAM(s) IV Intermittent every 8 hours      ICU Vital Signs Last 24 Hrs  T(C): 36.8 (03 Oct 2020 13:15), Max: 37.2 (02 Oct 2020 23:13)  T(F): 98.2 (03 Oct 2020 13:15), Max: 98.9 (02 Oct 2020 23:13)  HR: 77 (03 Oct 2020 16:00) (76 - 96)  BP: 129/79 (03 Oct 2020 16:00) (119/62 - 147/82)  BP(mean): 85 (03 Oct 2020 12:45) (85 - 99)  RR: 16 (03 Oct 2020 16:00) (12 - 20)  SpO2: 96% (03 Oct 2020 16:00) (96% - 100%)        PHYSICAL EXAM:    General/Neuro  RASS:   0          GCS:  15         alert & oriented x 3, no focal deficits  MUÑOZ  Motor strength 5/5 UE, 5/5 LE bilaterally  Pupils: equal and reactive    Lungs:   Clear to auscultation, Normal expansion/effort.     Cardiovascular : S1, S2.  Regular rate and rhythm.    Cardiac Rhythm: Normal Sinus Rhythm    GI: Abdomen soft, Non-tender, Non-distended.    Bowel sounds present    Extremities: Extremities warm, pink, well-perfused. Calves soft, non tender    Back- spinal drain intact  Dressings  left lateral with serosanguinous drainage  right lateral C/D/I  inferior dressing (drain underneath- is clean and dry    Derm: Good skin turgor, no skin breakdown.      :   nl genitalia, no murray      CXR:     LABS:                          15.2   10.91 )-----------( 284      ( 03 Oct 2020 12:56 )             46.0       10-03    138  |  108  |  27<H>  ----------------------------<  158<H>  5.0   |  17  |  1.3<H>    Ca    9.2      03 Oct 2020 12:56  Phos  5.9     10-03  Mg     2.2     10-03    TPro  6.7  /  Alb  3.9  /  TBili  0.5  /  DBili  x   /  AST  26  /  ALT  47<H>  /  AlkPhos  59  10-03      PT/INR - ( 03 Oct 2020 12:56 )   PT: 13.30 sec;   INR: 1.16 ratio         PTT - ( 03 Oct 2020 12:56 )  PTT:30.7 sec

## 2020-10-03 NOTE — CONSULT NOTE ADULT - ATTENDING COMMENTS
I personally examined this patient with the PA and resident and discussed my plan with them.    pt with dural leak after spinal fusion, repaired and post-op has a lumbar drain  pt admitted for lumbar drain care, hourly opening  hd stable  anxiety non medicated at home  post-op pain treated with tylenol, oxycodone  ancef for drain I personally examined this patient with the PA and resident and discussed my plan with them.    pt with dural leak after spinal fusion, repaired and post-op has a lumbar drain  pt admitted for lumbar drain care, hourly opening  hd stable  anxiety on valium at home  post-op pain treated with tylenol, oxycodone  ancef for drain

## 2020-10-03 NOTE — CONSULT NOTE ADULT - ASSESSMENT
18 y/o male with recurrent CSF leak on POD #7  (had been repaired at time of initial surgery)  Now with spinal drain    Neuro-  Pain control Tylenol standing/ Oxycodone 5mg/ 10 mg prn  Muscle spasm- Valium 5mg prn and Robaxin  Sensory and motor function intact  Lumbar drain to be opened q1hr and drain 15ml each hour    Pulm-  No chronic diagnosis  Currently on RA, saturating 93-95%  Encourage IS    Cardiology-  No chronic diagnosis  Cardiac monitoring, baseline   while on Zofran      GI-  Intractable vomiting prior to admission  Post op nausea- Zofran with relief of symptoms  Regular diet as tolerated  Protonix PPX    Renal  MAGNUS- 2/2 dehydration baseline BUN/ creat 20/0.9-1.0   Continue fluids and change to LR  Can NICOM for fluid responsiveness once arrives in the unit    ID  Mild leukocytosis 10.9  desirae-op ancef- continue until drain is discontinued  No signs of infection    Heme  H&H 15/46  this is patient's baseline  HSQ for DVT PPX    MSK-  MUÑOZ  bedrest with HOB at 15 degrees, may increase to 30 degrees when eating    Endo  No diagnosis  FS ac and qhs with coverage as needed    DIspo- Admit to SICU approved by Dr. Cintron

## 2020-10-03 NOTE — PROGRESS NOTE ADULT - SUBJECTIVE AND OBJECTIVE BOX
Post op check     S/p Repair of CSF leak and placement of Lumbar drain     pt seen and examined at bedside pt is alert , has c/o some nausea after he had dilaudid he  was given zofran and it resolved     Vital Signs Last 24 Hrs  T(C): 36.8 (03 Oct 2020 13:15), Max: 37.2 (02 Oct 2020 23:13)  T(F): 98.2 (03 Oct 2020 13:15), Max: 98.9 (02 Oct 2020 23:13)  HR: 86 (03 Oct 2020 14:00) (81 - 96)  BP: 129/75 (03 Oct 2020 14:00) (119/62 - 147/82)  BP(mean): 85 (03 Oct 2020 12:45) (85 - 99)  RR: 14 (03 Oct 2020 14:00) (12 - 18)  SpO2: 97% (03 Oct 2020 14:00) (97% - 100%)    I&O's Detail    03 Oct 2020 07:01  -  03 Oct 2020 14:27  --------------------------------------------------------  IN:    Lactated Ringers: 125 mL  Total IN: 125 mL    OUT:    External Ventricular Device (mL): 22 mL  Total OUT: 22 mL    Total NET: 103 mL        I&O's Summary    03 Oct 2020 07:01  -  03 Oct 2020 14:27  --------------------------------------------------------  IN: 125 mL / OUT: 22 mL / NET: 103 mL        PHYSICAL EXAM:    A&OX3 , PERRL   EOM (  I  )   MAEX4   MS bilateral UE's 5/5         bilateral LE"s 5/5   lumbar drain intact     LABS:                        15.2   10.91 )-----------( 284      ( 03 Oct 2020 12:56 )             46.0     10-03    138  |  108  |  27<H>  ----------------------------<  158<H>  5.0   |  17  |  1.3<H>    Ca    9.2      03 Oct 2020 12:56  Phos  5.9     10-03  Mg     2.2     10-03    TPro  6.7  /  Alb  3.9  /  TBili  0.5  /  DBili  x   /  AST  26  /  ALT  47<H>  /  AlkPhos  59  10-03    PT/INR - ( 03 Oct 2020 12:56 )   PT: 13.30 sec;   INR: 1.16 ratio         PTT - ( 03 Oct 2020 12:56 )  PTT:30.7 sec        Allergies    No Known Allergies    Intolerances      MEDICATIONS:  Antibiotics:  ceFAZolin   IVPB 1000 milliGRAM(s) IV Intermittent every 8 hours    Neuro:  acetaminophen   Tablet .. 650 milliGRAM(s) Oral every 6 hours  acetaminophen  IVPB .. 1000 milliGRAM(s) IV Intermittent once  cyclobenzaprine 10 milliGRAM(s) Oral <User Schedule> PRN  diazepam    Tablet 5 milliGRAM(s) Oral <User Schedule>  HYDROmorphone  Injectable 1 milliGRAM(s) IV Push every 10 minutes PRN  melatonin 5 milliGRAM(s) Oral at bedtime  ondansetron Injectable 4 milliGRAM(s) IV Push every 6 hours PRN  oxyCODONE    IR 5 milliGRAM(s) Oral every 6 hours PRN  oxyCODONE    IR 10 milliGRAM(s) Oral every 6 hours PRN    Anticoagulation:  heparin   Injectable 7500 Unit(s) SubCutaneous every 8 hours    OTHER:  bisacodyl 5 milliGRAM(s) Oral at bedtime  influenza   Vaccine 0.5 milliLiter(s) IntraMuscular once  pantoprazole    Tablet 40 milliGRAM(s) Oral before breakfast  polyethylene glycol 3350 17 Gram(s) Oral daily  senna 2 Tablet(s) Oral at bedtime PRN    IVF:  dextrose 5% + sodium chloride 0.45%. 1000 milliLiter(s) IV Continuous <Continuous>  multivitamin 1 Tablet(s) Oral daily    CULTURES:    RADIOLOGY & ADDITIONAL TESTS:      ASSESSMENT:  19y Male s/p    BACK PAIN, HEADACHE    ^BACK PAIN, HEADACHE    Family history of diabetes mellitus (DM)    Handoff    MEWS Score    Anxiety    Anxiety    Depression    No pertinent past medical history    Postoperative CSF leak    Postoperative CSF leak    Back pain    Exploration, wound, lumbar region, with repair of dural defect if indicated, for CSF leak    S/P ACL repair    History of tonsillectomy    BACK SURGERY COMPLICATIONS    3    Headache    [email or web address]        A/p          S/p Repair of CSF leak and placement of lumbar drain                  drain 15 cc CSF every hour                   flat bed rest for now                   pain control                   Bowel regimen

## 2020-10-03 NOTE — CHART NOTE - NSCHARTNOTEFT_GEN_A_CORE
PACU ANESTHESIA ADMISSION NOTE      Procedure: EXPLORATION AND REPAIR OF CSF LEAK WITH LUMBAR DRAIN  Post op diagnosis:  CSF LEAK    ____  Intubated  TV:______       Rate: ______      FiO2: ______    __X__  Patent Airway    ___X_  Full return of protective reflexes    __X__  Full recovery from anesthesia / back to baseline status    Vitals:  T(C):98  HR: 93  BP: 137/93   RR: 18   SpO2: 100%     Mental Status:  _X___ Awake   _____ Alert   _____ Drowsy   _____ Sedated    Nausea/Vomiting:  X____ NO  ______Yes,   See Post - Op Orders          Pain Scale (0-10):  _____    Treatment: _X___ None    ____ See Post - Op/PCA Orders    Post - Operative Fluids:   ____ Oral   _X___ See Post - Op Orders    Plan: Discharge:   ____Home       _____Floor     __X___Critical Care    _____  Other:_________________    Comments:REPORT GIVEN TP ICU KRISHNA JEONG

## 2020-10-03 NOTE — BRIEF OPERATIVE NOTE - OPERATION/FINDINGS
dural opening along lateral aspect of dura with extravasation of CSF.  Dura was closed with 4-0 Nurulon, covered with Duragen, DuraSeal, and further covered with Fat and further DuraSeal.  Lumbar drain placed as well.

## 2020-10-03 NOTE — BRIEF OPERATIVE NOTE - NSICDXBRIEFPROCEDURE_GEN_ALL_CORE_FT
PROCEDURES:  Exploration, wound, lumbar region, with repair of dural defect if indicated, for CSF leak 03-Oct-2020 12:36:13 left L5-S1 Candice Reyes

## 2020-10-04 LAB
ANION GAP SERPL CALC-SCNC: 11 MMOL/L — SIGNIFICANT CHANGE UP (ref 7–14)
APTT BLD: 27.6 SEC — SIGNIFICANT CHANGE UP (ref 27–39.2)
BUN SERPL-MCNC: 22 MG/DL — HIGH (ref 10–20)
CALCIUM SERPL-MCNC: 9.1 MG/DL — SIGNIFICANT CHANGE UP (ref 8.5–10.1)
CHLORIDE SERPL-SCNC: 108 MMOL/L — SIGNIFICANT CHANGE UP (ref 98–110)
CO2 SERPL-SCNC: 19 MMOL/L — SIGNIFICANT CHANGE UP (ref 17–32)
CREAT SERPL-MCNC: 1 MG/DL — SIGNIFICANT CHANGE UP (ref 0.3–1)
GLUCOSE SERPL-MCNC: 113 MG/DL — HIGH (ref 70–99)
HCT VFR BLD CALC: 43.4 % — SIGNIFICANT CHANGE UP (ref 42–52)
HGB BLD-MCNC: 14.2 G/DL — SIGNIFICANT CHANGE UP (ref 14–18)
INR BLD: 1.16 RATIO — SIGNIFICANT CHANGE UP (ref 0.65–1.3)
MAGNESIUM SERPL-MCNC: 2 MG/DL — SIGNIFICANT CHANGE UP (ref 1.8–2.4)
MCHC RBC-ENTMCNC: 29.5 PG — SIGNIFICANT CHANGE UP (ref 27–31)
MCHC RBC-ENTMCNC: 32.7 G/DL — SIGNIFICANT CHANGE UP (ref 32–37)
MCV RBC AUTO: 90.2 FL — SIGNIFICANT CHANGE UP (ref 80–94)
NRBC # BLD: 0 /100 WBCS — SIGNIFICANT CHANGE UP (ref 0–0)
PHOSPHATE SERPL-MCNC: 4.3 MG/DL — SIGNIFICANT CHANGE UP (ref 2.1–4.9)
PLATELET # BLD AUTO: 299 K/UL — SIGNIFICANT CHANGE UP (ref 130–400)
POTASSIUM SERPL-MCNC: 4.4 MMOL/L — SIGNIFICANT CHANGE UP (ref 3.5–5)
POTASSIUM SERPL-SCNC: 4.4 MMOL/L — SIGNIFICANT CHANGE UP (ref 3.5–5)
PROTHROM AB SERPL-ACNC: 13.3 SEC — HIGH (ref 9.95–12.87)
RBC # BLD: 4.81 M/UL — SIGNIFICANT CHANGE UP (ref 4.7–6.1)
RBC # FLD: 12.6 % — SIGNIFICANT CHANGE UP (ref 11.5–14.5)
SODIUM SERPL-SCNC: 138 MMOL/L — SIGNIFICANT CHANGE UP (ref 135–146)
WBC # BLD: 10.45 K/UL — SIGNIFICANT CHANGE UP (ref 4.8–10.8)
WBC # FLD AUTO: 10.45 K/UL — SIGNIFICANT CHANGE UP (ref 4.8–10.8)

## 2020-10-04 PROCEDURE — 71045 X-RAY EXAM CHEST 1 VIEW: CPT | Mod: 26

## 2020-10-04 PROCEDURE — 99232 SBSQ HOSP IP/OBS MODERATE 35: CPT

## 2020-10-04 RX ORDER — CLONAZEPAM 1 MG
0.5 TABLET ORAL ONCE
Refills: 0 | Status: DISCONTINUED | OUTPATIENT
Start: 2020-10-04 | End: 2020-10-04

## 2020-10-04 RX ORDER — CHLORHEXIDINE GLUCONATE 213 G/1000ML
1 SOLUTION TOPICAL
Refills: 0 | Status: DISCONTINUED | OUTPATIENT
Start: 2020-10-04 | End: 2020-10-12

## 2020-10-04 RX ORDER — DIPHENHYDRAMINE HCL 50 MG
50 CAPSULE ORAL ONCE
Refills: 0 | Status: COMPLETED | OUTPATIENT
Start: 2020-10-04 | End: 2020-10-04

## 2020-10-04 RX ADMIN — Medication 0.5 MILLIGRAM(S): at 21:43

## 2020-10-04 RX ADMIN — OXYCODONE HYDROCHLORIDE 10 MILLIGRAM(S): 5 TABLET ORAL at 12:29

## 2020-10-04 RX ADMIN — CHLORHEXIDINE GLUCONATE 1 APPLICATION(S): 213 SOLUTION TOPICAL at 05:49

## 2020-10-04 RX ADMIN — Medication 650 MILLIGRAM(S): at 12:29

## 2020-10-04 RX ADMIN — PANTOPRAZOLE SODIUM 40 MILLIGRAM(S): 20 TABLET, DELAYED RELEASE ORAL at 06:26

## 2020-10-04 RX ADMIN — Medication 650 MILLIGRAM(S): at 18:06

## 2020-10-04 RX ADMIN — Medication 5 MILLIGRAM(S): at 09:57

## 2020-10-04 RX ADMIN — Medication 5 MILLIGRAM(S): at 18:06

## 2020-10-04 RX ADMIN — HEPARIN SODIUM 7500 UNIT(S): 5000 INJECTION INTRAVENOUS; SUBCUTANEOUS at 21:26

## 2020-10-04 RX ADMIN — POLYETHYLENE GLYCOL 3350 17 GRAM(S): 17 POWDER, FOR SOLUTION ORAL at 14:06

## 2020-10-04 RX ADMIN — Medication 650 MILLIGRAM(S): at 06:24

## 2020-10-04 RX ADMIN — Medication 5 MILLIGRAM(S): at 23:18

## 2020-10-04 RX ADMIN — Medication 100 MILLIGRAM(S): at 21:26

## 2020-10-04 RX ADMIN — Medication 650 MILLIGRAM(S): at 12:55

## 2020-10-04 RX ADMIN — OXYCODONE HYDROCHLORIDE 5 MILLIGRAM(S): 5 TABLET ORAL at 06:25

## 2020-10-04 RX ADMIN — HEPARIN SODIUM 7500 UNIT(S): 5000 INJECTION INTRAVENOUS; SUBCUTANEOUS at 14:09

## 2020-10-04 RX ADMIN — POLYETHYLENE GLYCOL 3350 17 GRAM(S): 17 POWDER, FOR SOLUTION ORAL at 12:30

## 2020-10-04 RX ADMIN — Medication 650 MILLIGRAM(S): at 18:30

## 2020-10-04 RX ADMIN — Medication 1 TABLET(S): at 12:30

## 2020-10-04 RX ADMIN — CYCLOBENZAPRINE HYDROCHLORIDE 10 MILLIGRAM(S): 10 TABLET, FILM COATED ORAL at 09:44

## 2020-10-04 RX ADMIN — Medication 650 MILLIGRAM(S): at 23:18

## 2020-10-04 RX ADMIN — Medication 100 MILLIGRAM(S): at 06:34

## 2020-10-04 RX ADMIN — Medication 5 MILLIGRAM(S): at 21:26

## 2020-10-04 RX ADMIN — HEPARIN SODIUM 7500 UNIT(S): 5000 INJECTION INTRAVENOUS; SUBCUTANEOUS at 06:26

## 2020-10-04 RX ADMIN — OXYCODONE HYDROCHLORIDE 5 MILLIGRAM(S): 5 TABLET ORAL at 00:40

## 2020-10-04 RX ADMIN — OXYCODONE HYDROCHLORIDE 10 MILLIGRAM(S): 5 TABLET ORAL at 13:00

## 2020-10-04 RX ADMIN — Medication 650 MILLIGRAM(S): at 06:11

## 2020-10-04 RX ADMIN — Medication 5 MILLIGRAM(S): at 20:26

## 2020-10-04 RX ADMIN — Medication 100 MILLIGRAM(S): at 14:04

## 2020-10-04 RX ADMIN — OXYCODONE HYDROCHLORIDE 5 MILLIGRAM(S): 5 TABLET ORAL at 07:43

## 2020-10-04 NOTE — PROGRESS NOTE ADULT - SUBJECTIVE AND OBJECTIVE BOX
Subjective: 19yMale with a pmhx of BACK PAIN, HEADACHE    ^BACK PAIN, HEADACHE    Family history of diabetes mellitus (DM)    Handoff    MEWS Score    Anxiety    Anxiety    Depression    No pertinent past medical history    Postoperative CSF leak    Postoperative CSF leak    Back pain    Exploration, wound, lumbar region, with repair of dural defect if indicated, for CSF leak    S/P ACL repair    History of tonsillectomy    BACK SURGERY COMPLICATIONS    3    Headache    [email or web address]      POD # 1 S/p Repair of CSF leak and placement of Lumbar drain     pt seen and examined at bedside pt is alert , c/o some incisional back pain. No radicular pain or parethesias.     Allergies    No Known Allergies    Intolerances        Vital Signs Last 24 Hrs  T(C): 36.8 (04 Oct 2020 08:23), Max: 37 (03 Oct 2020 20:00)  T(F): 98.3 (04 Oct 2020 08:23), Max: 98.6 (03 Oct 2020 20:00)  HR: 73 (04 Oct 2020 07:00) (73 - 95)  BP: 138/75 (04 Oct 2020 07:00) (119/62 - 150/87)  BP(mean): 101 (04 Oct 2020 07:00) (84 - 113)  RR: 16 (03 Oct 2020 21:00) (12 - 20)  SpO2: 100% (04 Oct 2020 07:00) (96% - 100%)      acetaminophen   Tablet .. 650 milliGRAM(s) Oral every 6 hours  bisacodyl 5 milliGRAM(s) Oral at bedtime  ceFAZolin   IVPB 1000 milliGRAM(s) IV Intermittent every 8 hours  chlorhexidine 4% Liquid 1 Application(s) Topical <User Schedule>  cyclobenzaprine 10 milliGRAM(s) Oral <User Schedule> PRN  diazepam    Tablet 5 milliGRAM(s) Oral <User Schedule>  heparin   Injectable 7500 Unit(s) SubCutaneous every 8 hours  influenza   Vaccine 0.5 milliLiter(s) IntraMuscular once  melatonin 5 milliGRAM(s) Oral at bedtime  multivitamin 1 Tablet(s) Oral daily  oxyCODONE    IR 5 milliGRAM(s) Oral every 6 hours PRN  oxyCODONE    IR 10 milliGRAM(s) Oral every 6 hours PRN  pantoprazole    Tablet 40 milliGRAM(s) Oral before breakfast  polyethylene glycol 3350 17 Gram(s) Oral daily  senna 2 Tablet(s) Oral at bedtime PRN        10-03-20 @ 07:01  -  10-04-20 @ 07:00  --------------------------------------------------------  IN: 2550 mL / OUT: 1498 mL / NET: 1052 mL        REVIEW OF SYSTEMS    [ x ] A ten-point review of systems was otherwise negative except as noted.  [ ] Due to altered mental status/intubation, subjective information were not able to be obtained from the patient. History was obtained, to the extent possible, from review of the chart and collateral sources of information.      Exam:  AAOX3. Verbal function intact  tongue midline, facial motions symmetric  PERRLA, EOMI  Motor: MAEx4, 5/5 power in b/l UE and LE  Sensation: intact to touch in all extremities  lumbar drain intact     CBC Full  -  ( 03 Oct 2020 23:30 )  WBC Count : 10.45 K/uL  RBC Count : 4.81 M/uL  Hemoglobin : 14.2 g/dL  Hematocrit : 43.4 %  Platelet Count - Automated : 299 K/uL  Mean Cell Volume : 90.2 fL  Mean Cell Hemoglobin : 29.5 pg  Mean Cell Hemoglobin Concentration : 32.7 g/dL      10-03    138  |  108  |  22<H>  ----------------------------<  113<H>  4.4   |  19  |  1.0    Ca    9.1      03 Oct 2020 23:30  Phos  4.3     10-03  Mg     2.0     10-03    TPro  6.7  /  Alb  3.9  /  TBili  0.5  /  DBili  x   /  AST  26  /  ALT  47<H>  /  AlkPhos  59  10-03    PT/INR - ( 03 Oct 2020 23:30 )   PT: 13.30 sec;   INR: 1.16 ratio         PTT - ( 03 Oct 2020 23:30 )  PTT:27.6 sec      OUT:    External Ventricular Device (mL): 98 mL      Imaging:    < from: Xray Chest 1 View- PORTABLE-Routine (10.04.20 @ 06:20) >  Impression:    No radiographic evidence of acute cardiopulmonary disease.    < end of copied text >    < from: CT Lumbar Spine No Cont (10.02.20 @ 17:15) >  IMPRESSION:    1.  Patient is status post L4-S1 posterior germain and screw fixation, with a disc spacer at L5-S1. No evidence of hardware complication.    2.  Bilateral dorsal subcutaneous fluid collection centered at about L3/L4, measuring up to 5.5 cm on the right side and 8.5 cm on the left side. Additional edema/ill-defined fluid collection within the left dorsal paraspinal musculature measuring up to 7 cm. Since the clinical concern is for CSF leak, a CSF leak is not excluded. Consider MRI for better delineation as clinically warranted.    3.  L5-S1- grade II spondylolisthesis on the basis of bilateral L5 pars interarticularis defects. There is likely severe bilateral neural foraminal stenosis (limited in evaluation due to streak artifact from the hardware).    4.  L4-5- grade I retrolisthesis with likely mild bilateral neural foraminal stenosis.    ANTIONETTE GRULLON M.D., ATTENDING RADIOLOGIST  This document has been electronically signed. Oct  2 2020  5:12PM    < end of copied text >    Assessment/Plan:           S/p Repair of CSF leak and placement of lumbar drain                   drain 15 cc CSF every hour                   ok to raise HOB slowly today up to 90 degrees but keep in bed today                  pain control                   Bowel regimen                   on hep sub q for dvt/prophylaxis                  d/w attending

## 2020-10-04 NOTE — PROGRESS NOTE ADULT - SUBJECTIVE AND OBJECTIVE BOX
Ary name: Manohar Aguayo  Age 19 yrs    Indication for ICU admission:  Spinal drain  Admit Date:  10/2  ICU Date:      10/3  OR Date:      10/3    No Known Allergies    PAST MEDICAL & SURGICAL HISTORY:  Anxiety  S/P ACL repair  x2  History of tonsillectomy  Home Medications:     HISTORY OF PRESENT ILLNESS:   19 year old male with a h/o chronic sports-related vertebral degenerative changes, s/p L5-S1 TLIF with repaired dural leak with Dr. Reyes on 9/25/20, who sent patient to the  ED for evaluation of progressively worsening positional diffuse headache, nausea with emesis, and back pain after being discharged 4 days ago.  He also notes associated photophobia. Pt states symptoms are persistent and is unable to sleep due to pain.   He has been on Diazepam, Percocet, Robaxin, Acetazolamide, Senna, Protonix.  Medications were changed from Percocet to Fioricet for symptomatic control yesterday morning. Last pain medication taken was 11 PM last night, Diazepam.  Urinated this AM and able to defecate.  Otherwise, he denies vision changes, stool/urine incontinence or retention, weakness, focal deficits, or other sx and complaints including fever, chills, diarrhea, chest pain, dyspnea.  Denies saddle anesthesia, paresthesias.     20 y/o male with recurrent CSF leak on POD #7  (had been repaired at time of initial surgery)  Now s/p primary repair of dura , application of duraseal and a fat plug  A spinal drain was placed for intermittant drainage of CSF    24 HOUR EVENTS    NICOM was 7.9. Total received 500cc. Creatinine improved from 1.3 to 1.0. IVL. Received Benadryl 50 IVP x1 for sleep.     Neuro-  Pain control Tylenol standing/ Oxycodone 5mg/ 10 mg prn  Muscle spasm- Valium 5mg prn and Robaxin  Sensory and motor function intact  Lumbar drain to be opened q1hr and drain 15ml each hour    Pulm-  No chronic diagnosis  Currently on RA, saturating 93-95%  Encourage IS    Cardiology-  No chronic diagnosis  Cardiac monitoring, baseline   while on Zofran      GI-  Intractable vomiting prior to admission  Post op nausea- Zofran with relief of symptoms  Regular diet as tolerated  Protonix PPX    Renal  MAGNUS 2/2 dehydration resolved. BUN/Cr now 1.0/22  Baseline BUN/ creat 20/0.9-1.0   IVL  NICOM 500 was 7.9  Voiding     ID  Mild leukocytosis that is lateral 10.9 > 10.3  desirae-op ancef- continue until drain is discontinued  No signs of infection    Heme  H&H 15/46 > 14.2  this is patient's baseline  HSQ for DVT PPX    MSK-  MUÑOZ  bedrest with HOB at 15 degrees, may increase to 30 degrees when eating    Endo  No diagnosis  FS ac and qhs with coverage as needed                     Ary name: Manohar Aguayo  Age 19 yrs    Indication for ICU admission:  Spinal drain  Admit Date:  10/2  ICU Date:      10/3  OR Date:      10/3    No Known Allergies    PAST MEDICAL & SURGICAL HISTORY:  Anxiety  S/P ACL repair  x2  History of tonsillectomy  Home Medications:     HISTORY OF PRESENT ILLNESS:   19 year old male with a h/o chronic sports-related vertebral degenerative changes, s/p L5-S1 TLIF with repaired dural leak with Dr. Reeys on 9/25/20, who sent patient to the  ED for evaluation of progressively worsening positional diffuse headache, nausea with emesis, and back pain after being discharged 4 days ago.  He also notes associated photophobia. Pt states symptoms are persistent and is unable to sleep due to pain.   He has been on Diazepam, Percocet, Robaxin, Acetazolamide, Senna, Protonix.  Medications were changed from Percocet to Fioricet for symptomatic control yesterday morning. Last pain medication taken was 11 PM last night, Diazepam.  Urinated this AM and able to defecate.  Otherwise, he denies vision changes, stool/urine incontinence or retention, weakness, focal deficits, or other sx and complaints including fever, chills, diarrhea, chest pain, dyspnea.  Denies saddle anesthesia, paresthesias.     20 y/o male with recurrent CSF leak on POD #7  (had been repaired at time of initial surgery)  Now s/p primary repair of dura , application of duraseal and a fat plug  A spinal drain was placed for intermittant drainage of CSF    24 HOUR EVENTS:    NICOM was 7.9. Total received 500cc. Creatinine improved from 1.3 to 1.0. IVL. Received Benadryl 50 IVP x1 for sleep.     Neuro-  Pain control Tylenol standing/ Oxycodone 5mg/ 10 mg prn  Muscle spasm- Valium 5mg prn and Robaxin  Sensory and motor function intact  Lumbar drain to be opened q1hr and drain 15ml each hour    Pulm-  No chronic diagnosis  Currently on RA, saturating 93-95%  Encourage IS    Cardiology-  No chronic diagnosis  Cardiac monitoring, baseline   while on Zofran    GI-  Intractable vomiting prior to admission  Post op nausea- Zofran with relief of symptoms  Regular diet as tolerated  Protonix PPX    Renal  MAGNUS 2/2 dehydration resolved. BUN/Cr now 1.0/22  Baseline BUN/ creat 20/0.9-1.0   IVL  NICOM 500 was 7.9  Voiding   Lytes-Na 138 // K 4.4 // Phos 4.3 //  Mag 2.0      ID  Mild leukocytosis that is lateral 10.9 > 10.3  desirae-op ancef- continue until drain is discontinued  No signs of infection    Heme  H&H 15/46 > 14.2  this is patient's baseline  HSQ for DVT PPX    MSK-  MUÑOZ  bedrest with HOB at 15 degrees, may increase to 30 degrees when eating    Endo  No diagnosis  FS ac and qhs with coverage as needed    DVT PTX: heparin   Injectable 7500 Unit(s) SubCutaneous every 8 hours    GI PTX:bisacodyl 5 milliGRAM(s) Oral at bedtime  pantoprazole    Tablet 40 milliGRAM(s) Oral before breakfast  polyethylene glycol 3350 17 Gram(s) Oral daily  senna 2 Tablet(s) Oral at bedtime PRN      ***Tubes/Lines/Drains  ***  Peripheral IV    [ x]A ten-point review of systems was otherwise negative except as noted above.  [ ]Due to altered mental status/intubation, subjective information was not attained from the patient.  History was obtained, to the extent possible, from review of the chart and collateral sources of information.                      Ary name: Manohar Aguayo  Age 19 yrs    Indication for ICU admission:  Spinal drain  Admit Date:  10/2  ICU Date:      10/3  OR Date:      10/3    No Known Allergies    PAST MEDICAL & SURGICAL HISTORY:  Anxiety  S/P ACL repair  x2  History of tonsillectomy  Home Medications:     HISTORY OF PRESENT ILLNESS:   19 year old male with a h/o chronic sports-related vertebral degenerative changes, s/p L5-S1 TLIF with repaired dural leak with Dr. Reyes on 20, who sent patient to the  ED for evaluation of progressively worsening positional diffuse headache, nausea with emesis, and back pain after being discharged 4 days ago.  He also notes associated photophobia. Pt states symptoms are persistent and is unable to sleep due to pain.   He has been on Diazepam, Percocet, Robaxin, Acetazolamide, Senna, Protonix.  Medications were changed from Percocet to Fioricet for symptomatic control yesterday morning. Last pain medication taken was 11 PM last night, Diazepam.  Urinated this AM and able to defecate.  Otherwise, he denies vision changes, stool/urine incontinence or retention, weakness, focal deficits, or other sx and complaints including fever, chills, diarrhea, chest pain, dyspnea.  Denies saddle anesthesia, paresthesias.     18 y/o male with recurrent CSF leak on POD #7  (had been repaired at time of initial surgery)  Now s/p primary repair of dura , application of duraseal and a fat plug  A spinal drain was placed for intermittant drainage of CSF    24 HOUR EVENTS:    NICOM was 7.9. Total received 500cc. Creatinine improved from 1.3 to 1.0. IVL. Received Benadryl 50 IVP x1 for sleep.     DVT PTX: heparin   Injectable 7500 Unit(s) SubCutaneous every 8 hours    GI PTX:bisacodyl 5 milliGRAM(s) Oral at bedtime  pantoprazole    Tablet 40 milliGRAM(s) Oral before breakfast  polyethylene glycol 3350 17 Gram(s) Oral daily  senna 2 Tablet(s) Oral at bedtime PRN      ***Tubes/Lines/Drains  ***  Peripheral IV    [ x]A ten-point review of systems was otherwise negative except as noted above.  [ ]Due to altered mental status/intubation, subjective information was not attained from the patient.  History was obtained, to the extent possible, from review of the chart and collateral sources of information.     Daily Height in cm: 198.12 (03 Oct 2020 07:52)    Daily Weight in k.9 (04 Oct 2020 06:00)    Diet, Regular (10-03-20 @ 12:55)      CURRENT MEDS:  Neurologic Medications  acetaminophen   Tablet .. 650 milliGRAM(s) Oral every 6 hours  cyclobenzaprine 10 milliGRAM(s) Oral <User Schedule> PRN Muscle Spasm  diazepam    Tablet 5 milliGRAM(s) Oral <User Schedule>  melatonin 5 milliGRAM(s) Oral at bedtime  ondansetron Injectable 4 milliGRAM(s) IV Push every 6 hours PRN Nausea and/or Vomiting  oxyCODONE    IR 5 milliGRAM(s) Oral every 6 hours PRN Moderate Pain (4 - 6)  oxyCODONE    IR 10 milliGRAM(s) Oral every 6 hours PRN Severe Pain (7 - 10)    Respiratory Medications    Cardiovascular Medications    Gastrointestinal Medications  bisacodyl 5 milliGRAM(s) Oral at bedtime  multivitamin 1 Tablet(s) Oral daily  pantoprazole    Tablet 40 milliGRAM(s) Oral before breakfast  polyethylene glycol 3350 17 Gram(s) Oral daily  senna 2 Tablet(s) Oral at bedtime PRN Constipation    Genitourinary Medications    Hematologic/Oncologic Medications  heparin   Injectable 7500 Unit(s) SubCutaneous every 8 hours  influenza   Vaccine 0.5 milliLiter(s) IntraMuscular once    Antimicrobial/Immunologic Medications  ceFAZolin   IVPB 1000 milliGRAM(s) IV Intermittent every 8 hours    Endocrine/Metabolic Medications    Topical/Other Medications  chlorhexidine 4% Liquid 1 Application(s) Topical <User Schedule>      ICU Vital Signs Last 24 Hrs  T(C): 37 (04 Oct 2020 04:00), Max: 37 (03 Oct 2020 20:00)  T(F): 98.6 (04 Oct 2020 04:00), Max: 98.6 (03 Oct 2020 20:00)  HR: 73 (04 Oct 2020 07:00) (73 - 96)  BP: 138/75 (04 Oct 2020 07:00) (119/62 - 150/87)  BP(mean): 101 (04 Oct 2020 07:00) (84 - 113)  RR: 16 (03 Oct 2020 21:00) (12 - 20)  SpO2: 100% (04 Oct 2020 07:00) (96% - 100%)            I&O's Summary    03 Oct 2020 07:01  -  04 Oct 2020 07:00  --------------------------------------------------------  IN: 2550 mL / OUT: 1498 mL / NET: 1052 mL      I&O's Detail    03 Oct 2020 07:01  -  04 Oct 2020 07:00  --------------------------------------------------------  IN:    dextrose 5% + sodium chloride 0.45%: 225 mL    Lactated Ringers: 125 mL    Lactated Ringers: 1000 mL    Oral Fluid: 700 mL    Sodium Chloride 0.9% Bolus: 500 mL  Total IN: 2550 mL    OUT:    External Ventricular Device (mL): 98 mL    Voided (mL): 1400 mL  Total OUT: 1498 mL    Total NET: 1052 mL          PHYSICAL EXAM:    General/Neuro  RASS:             GCS:     = E   / V   / M      Deficits:                             alert & oriented x 3, no focal deficits  Pupils:    Lungs:      clear to auscultation, Normal expansion/effort.     Cardiovascular : S1, S2.  Regular rate and rhythm.  Peripheral edema   Cardiac Rhythm: Normal Sinus Rhythm    GI: Abdomen soft, Non-tender, Non-distended.    Gastrostomy / Jejunostomy tube in place.  Nasogastric tube in place.  Colostomy / Ileostomy.    Wound:    Extremities: Extremities warm, pink, well-perfused. Pulses:Rt     Lt    Derm: Good skin turgor, no skin breakdown.      :       Shirley catheter in place.      CXR:     LABS:  CAPILLARY BLOOD GLUCOSE                              14.2   10.45 )-----------( 299      ( 03 Oct 2020 23:30 )             43.4       10-03    138  |  108  |  22<H>  ----------------------------<  113<H>  4.4   |  19  |  1.0    Ca    9.1      03 Oct 2020 23:30  Phos  4.3     10-03  Mg     2.0     10-03    TPro  6.7  /  Alb  3.9  /  TBili  0.5  /  DBili  x   /  AST  26  /  ALT  47<H>  /  AlkPhos  59  10-03      PT/INR - ( 03 Oct 2020 23:30 )   PT: 13.30 sec;   INR: 1.16 ratio         PTT - ( 03 Oct 2020 23:30 )  PTT:27.6 sec                               Patient  name: Manohar Aguayo  Age 19 yrs    Indication for ICU admission:  Spinal drain  Admit Date:  10/2  ICU Date:      10/3  OR Date:      10/3    No Known Allergies    PAST MEDICAL & SURGICAL HISTORY:  Anxiety  S/P ACL repair  x2  History of tonsillectomy  Home Medications:     HISTORY OF PRESENT ILLNESS:   19 year old male with a h/o chronic sports-related vertebral degenerative changes, s/p L5-S1 TLIF with repaired dural leak with Dr. Reyes on 20, who sent patient to the  ED for evaluation of progressively worsening positional diffuse headache, nausea with emesis, and back pain after being discharged 4 days ago.  He also notes associated photophobia. Pt states symptoms are persistent and is unable to sleep due to pain.   He has been on Diazepam, Percocet, Robaxin, Acetazolamide, Senna, Protonix.  Medications were changed from Percocet to Fioricet for symptomatic control yesterday morning. Last pain medication taken was 11 PM last night, Diazepam.  Urinated this AM and able to defecate.  Otherwise, he denies vision changes, stool/urine incontinence or retention, weakness, focal deficits, or other sx and complaints including fever, chills, diarrhea, chest pain, dyspnea.  Denies saddle anesthesia, paresthesias.     18 y/o male with recurrent CSF leak on POD #7  (had been repaired at time of initial surgery)  Now s/p primary repair of dura , application of duraseal and a fat plug  A spinal drain was placed for intermittant drainage of CSF    24 HOUR EVENTS:    NICOM was 7.9. Total received 500cc. Creatinine improved from 1.3 to 1.0. IVL. Received Benadryl 50 IVP x1 for sleep.     DVT PTX: heparin   Injectable 7500 Unit(s) SubCutaneous every 8 hours    GI PTX:bisacodyl 5 milliGRAM(s) Oral at bedtime  pantoprazole    Tablet 40 milliGRAM(s) Oral before breakfast  polyethylene glycol 3350 17 Gram(s) Oral daily  senna 2 Tablet(s) Oral at bedtime PRN      ***Tubes/Lines/Drains  ***  Peripheral IV    [ x]A ten-point review of systems was otherwise negative except as noted above.  [ ]Due to altered mental status/intubation, subjective information was not attained from the patient.  History was obtained, to the extent possible, from review of the chart and collateral sources of information.     Daily Height in cm: 198.12 (03 Oct 2020 07:52)    Daily Weight in k.9 (04 Oct 2020 06:00)    Diet, Regular (10-03-20 @ 12:55)      CURRENT MEDS:  Neurologic Medications  acetaminophen   Tablet .. 650 milliGRAM(s) Oral every 6 hours  cyclobenzaprine 10 milliGRAM(s) Oral <User Schedule> PRN Muscle Spasm  diazepam    Tablet 5 milliGRAM(s) Oral <User Schedule>  melatonin 5 milliGRAM(s) Oral at bedtime  ondansetron Injectable 4 milliGRAM(s) IV Push every 6 hours PRN Nausea and/or Vomiting  oxyCODONE    IR 5 milliGRAM(s) Oral every 6 hours PRN Moderate Pain (4 - 6)  oxyCODONE    IR 10 milliGRAM(s) Oral every 6 hours PRN Severe Pain (7 - 10)      Gastrointestinal Medications  bisacodyl 5 milliGRAM(s) Oral at bedtime  multivitamin 1 Tablet(s) Oral daily  pantoprazole    Tablet 40 milliGRAM(s) Oral before breakfast  polyethylene glycol 3350 17 Gram(s) Oral daily  senna 2 Tablet(s) Oral at bedtime PRN Constipation      Hematologic/Oncologic Medications  heparin   Injectable 7500 Unit(s) SubCutaneous every 8 hours  influenza   Vaccine 0.5 milliLiter(s) IntraMuscular once    Antimicrobial/Immunologic Medications  ceFAZolin   IVPB 1000 milliGRAM(s) IV Intermittent every 8 hours      Topical/Other Medications  chlorhexidine 4% Liquid 1 Application(s) Topical <User Schedule>      ICU Vital Signs Last 24 Hrs  T(C): 37 (04 Oct 2020 04:00), Max: 37 (03 Oct 2020 20:00)  T(F): 98.6 (04 Oct 2020 04:00), Max: 98.6 (03 Oct 2020 20:00)  HR: 73 (04 Oct 2020 07:00) (73 - 96)  BP: 138/75 (04 Oct 2020 07:00) (119/62 - 150/87)  BP(mean): 101 (04 Oct 2020 07:00) (84 - 113)  RR: 16 (03 Oct 2020 21:00) (12 - 20)  SpO2: 100% (04 Oct 2020 07:00) (96% - 100%)            I&O's Summary    03 Oct 2020 07:01  -  04 Oct 2020 07:00  --------------------------------------------------------  IN: 2550 mL / OUT: 1498 mL / NET: 1052 mL      I&O's Detail    03 Oct 2020 07:01  -  04 Oct 2020 07:00  --------------------------------------------------------  IN:    dextrose 5% + sodium chloride 0.45%: 225 mL    Lactated Ringers: 125 mL    Lactated Ringers: 1000 mL    Oral Fluid: 700 mL    Sodium Chloride 0.9% Bolus: 500 mL  Total IN: 2550 mL    OUT:    External Ventricular Device (mL): 98 mL    Voided (mL): 1400 mL  Total OUT: 1498 mL    Total NET: 1052 mL          PHYSICAL EXAM:    General/Neuro  RASS:  0           GCS: 15     Alert & oriented x 3, no focal deficits  Motor strength 5/5 bilateral upper and lower extremities  no sensory deficits    Lungs:      clear to auscultation, Normal expansion/effort.     Cardiovascular : S1, S2.  Regular rate and rhythm.  No murmur      GI: Abdomen soft, Non-tender, Non-distended.    BS present  Wound:    Extremities: Extremities warm, pink, well-perfused. Pulses: DP/ PT palpable bilaterally    Derm: Good skin turgor, no skin breakdown.      :  No murray, voiding      LABS:                        14.2   10.45 )-----------( 299      ( 03 Oct 2020 23:30 )             43.4       10-    138  |  108  |  22<H>  ----------------------------<  113<H>  4.4   |  19  |  1.0    Ca    9.1        Phos  4.3       Mg     2.0         TPro  6.7  /  Alb  3.9  /  TBili  0.5  /  DBili  x   /  AST  26  /  ALT  47<H>  /  AlkPhos  59  10-03      PT/INR - ( 03 Oct 2020 23:30 )   PT: 13.30 sec;   INR: 1.16 ratio         PTT - ( 03 Oct 2020 23:30 )  PTT:27.6 sec

## 2020-10-04 NOTE — PROGRESS NOTE ADULT - ASSESSMENT
ASSESSMENT: 19M with h/o chronic sports related degenerative changes s/p L5-S1 TLIF w/ repaired dural leak 9/25/20 no s/p wound exploration, dural defect repair and lumbar drain     Neuro-  Pain control Tylenol standing/ Oxycodone 5mg/ 10 mg prn  Muscle spasm- Valium 5mg prn and Robaxin  Sensory and motor function intact  Lumbar drain to be opened q1hr and drain 15ml each hour    Pulm-  No chronic diagnosis  Currently on RA, saturating 93-95%  Encourage IS    Cardiology-  No chronic diagnosis  Cardiac monitoring, baseline   while on Zofran    GI-  Intractable vomiting prior to admission  Post op nausea- Zofran with relief of symptoms  Regular diet as tolerated  Protonix PPX    Renal  MAGNUS 2/2 dehydration resolved. BUN/Cr now 1.0/22  Baseline BUN/ creat 20/0.9-1.0   IVL  NICOM 500 was 7.9  Voiding     ID  Mild leukocytosis that is lateral 10.9 > 10.3  desirae-op ancef- continue until drain is discontinued  No signs of infection    Heme  H&H 15/46 > 14.2  this is patient's baseline  HSQ for DVT PPX    MSK-  MUÑOZ  bedrest with HOB at 15 degrees, may increase to 30 degrees when eating    Endo  No diagnosis  FS ac and qhs with coverage as needed Neuro-  Pain control Tylenol standing/ Oxycodone 5mg/ 10 mg prn  Muscle spasm- Valium 5mg prn and Robaxin  Sensory and motor function intact  Lumbar drain to be opened q1hr and drain 15ml each hour    Pulm-  No chronic diagnosis  Currently on RA, saturating 93-95%  Encourage IS    Cardiology-  No chronic diagnosis  Cardiac monitoring, baseline   while on Zofran    GI-  Intractable vomiting prior to admission  Post op nausea- Zofran with relief of symptoms  Regular diet as tolerated  Protonix PPX    Renal  MAGNUS 2/2 dehydration resolved. BUN/Cr now 1.0/22  Baseline BUN/ creat 20/0.9-1.0   IVL  NICOM 500 was 7.9  Voiding   Lytes-Na 138 // K 4.4 // Phos 4.3 //  Mag 2.0      ID  Mild leukocytosis that is lateral 10.9 > 10.3  desirae-op ancef- continue until drain is discontinued  No signs of infection    Heme  H&H 15/46 > 14.2  this is patient's baseline  HSQ for DVT PPX    MSK-  MUÑOZ  bedrest with HOB at 15 degrees, may increase to 30 degrees when eating    Endo  No diagnosis  FS ac and qhs with coverage as needed     Neuro-  Pain control Tylenol standing/ Oxycodone 5mg/ 10 mg prn  Muscle spasm- Valium 5mg prn and Robaxin  Sensory and motor function intact  Lumbar drain management as per neurosurgery  currently opening once per hour to drain 15 ml/hr  strict bedrest with HOB 15 degrees, can increase to 30 degrees for meals    Pulm-  No chronic diagnosis  NO acute issues  Currently on RA, saturating 93-95%  Encourage IS    Cardiology-  No chronic diagnosis  No acute issues  Cardiac monitoring, baseline      GI-  Intractable vomiting prior to admission- appears to have resolved  Tolerating regular diet  Protonix PPX- continue    Renal  MAGNUS 2/2 dehydration resolved. BUN/Cr now 22/1.0  Baseline BUN/ creat 20/0.9-1.0   IVL  Voiding   No electrolyte abnl      ID  Mild leukocytosis 10.9 > 10.3  desirae-op ancef- until drain is discontinued    Heme  H&H 15/46 > 14.2  this is patient's baseline  HSQ for DVT PPX    MSK-  MUÑOZ  bedrest with HOB at 15 degrees, may increase to 30 degrees when eating    Endo  No diagnosis  FS ac and qhs    Dispo- contnue ICU level of care.

## 2020-10-05 LAB
ANION GAP SERPL CALC-SCNC: 11 MMOL/L — SIGNIFICANT CHANGE UP (ref 7–14)
BUN SERPL-MCNC: 25 MG/DL — HIGH (ref 10–20)
CALCIUM SERPL-MCNC: 8.7 MG/DL — SIGNIFICANT CHANGE UP (ref 8.5–10.1)
CHLORIDE SERPL-SCNC: 107 MMOL/L — SIGNIFICANT CHANGE UP (ref 98–110)
CO2 SERPL-SCNC: 21 MMOL/L — SIGNIFICANT CHANGE UP (ref 17–32)
CREAT SERPL-MCNC: 1 MG/DL — SIGNIFICANT CHANGE UP (ref 0.3–1)
GLUCOSE SERPL-MCNC: 127 MG/DL — HIGH (ref 70–99)
HCT VFR BLD CALC: 41.6 % — LOW (ref 42–52)
HGB BLD-MCNC: 13.6 G/DL — LOW (ref 14–18)
INR BLD: 1.04 RATIO — SIGNIFICANT CHANGE UP (ref 0.65–1.3)
MAGNESIUM SERPL-MCNC: 1.8 MG/DL — SIGNIFICANT CHANGE UP (ref 1.8–2.4)
MCHC RBC-ENTMCNC: 29.4 PG — SIGNIFICANT CHANGE UP (ref 27–31)
MCHC RBC-ENTMCNC: 32.7 G/DL — SIGNIFICANT CHANGE UP (ref 32–37)
MCV RBC AUTO: 90 FL — SIGNIFICANT CHANGE UP (ref 80–94)
NRBC # BLD: 0 /100 WBCS — SIGNIFICANT CHANGE UP (ref 0–0)
PHOSPHATE SERPL-MCNC: 4.1 MG/DL — SIGNIFICANT CHANGE UP (ref 2.1–4.9)
PLATELET # BLD AUTO: 296 K/UL — SIGNIFICANT CHANGE UP (ref 130–400)
POTASSIUM SERPL-MCNC: 4.1 MMOL/L — SIGNIFICANT CHANGE UP (ref 3.5–5)
POTASSIUM SERPL-SCNC: 4.1 MMOL/L — SIGNIFICANT CHANGE UP (ref 3.5–5)
PROTHROM AB SERPL-ACNC: 12 SEC — SIGNIFICANT CHANGE UP (ref 9.95–12.87)
RBC # BLD: 4.62 M/UL — LOW (ref 4.7–6.1)
RBC # FLD: 13.1 % — SIGNIFICANT CHANGE UP (ref 11.5–14.5)
SODIUM SERPL-SCNC: 139 MMOL/L — SIGNIFICANT CHANGE UP (ref 135–146)
WBC # BLD: 13.59 K/UL — HIGH (ref 4.8–10.8)
WBC # FLD AUTO: 13.59 K/UL — HIGH (ref 4.8–10.8)

## 2020-10-05 PROCEDURE — 71045 X-RAY EXAM CHEST 1 VIEW: CPT | Mod: 26

## 2020-10-05 PROCEDURE — 99291 CRITICAL CARE FIRST HOUR: CPT

## 2020-10-05 PROCEDURE — 93010 ELECTROCARDIOGRAM REPORT: CPT

## 2020-10-05 RX ORDER — LACTULOSE 10 G/15ML
10 SOLUTION ORAL ONCE
Refills: 0 | Status: COMPLETED | OUTPATIENT
Start: 2020-10-05 | End: 2020-10-05

## 2020-10-05 RX ORDER — CLONAZEPAM 1 MG
0.5 TABLET ORAL ONCE
Refills: 0 | Status: DISCONTINUED | OUTPATIENT
Start: 2020-10-05 | End: 2020-10-05

## 2020-10-05 RX ORDER — MAGNESIUM SULFATE 500 MG/ML
1 VIAL (ML) INJECTION ONCE
Refills: 0 | Status: COMPLETED | OUTPATIENT
Start: 2020-10-05 | End: 2020-10-05

## 2020-10-05 RX ADMIN — Medication 650 MILLIGRAM(S): at 06:00

## 2020-10-05 RX ADMIN — OXYCODONE HYDROCHLORIDE 5 MILLIGRAM(S): 5 TABLET ORAL at 09:40

## 2020-10-05 RX ADMIN — Medication 650 MILLIGRAM(S): at 17:37

## 2020-10-05 RX ADMIN — Medication 100 MILLIGRAM(S): at 21:52

## 2020-10-05 RX ADMIN — HEPARIN SODIUM 7500 UNIT(S): 5000 INJECTION INTRAVENOUS; SUBCUTANEOUS at 21:52

## 2020-10-05 RX ADMIN — OXYCODONE HYDROCHLORIDE 5 MILLIGRAM(S): 5 TABLET ORAL at 08:53

## 2020-10-05 RX ADMIN — Medication 100 MILLIGRAM(S): at 05:04

## 2020-10-05 RX ADMIN — PANTOPRAZOLE SODIUM 40 MILLIGRAM(S): 20 TABLET, DELAYED RELEASE ORAL at 06:01

## 2020-10-05 RX ADMIN — Medication 650 MILLIGRAM(S): at 23:01

## 2020-10-05 RX ADMIN — Medication 50 GRAM(S): at 04:09

## 2020-10-05 RX ADMIN — Medication 5 MILLIGRAM(S): at 23:01

## 2020-10-05 RX ADMIN — Medication 0.5 MILLIGRAM(S): at 18:57

## 2020-10-05 RX ADMIN — OXYCODONE HYDROCHLORIDE 5 MILLIGRAM(S): 5 TABLET ORAL at 22:01

## 2020-10-05 RX ADMIN — Medication 100 MILLIGRAM(S): at 14:07

## 2020-10-05 RX ADMIN — Medication 650 MILLIGRAM(S): at 00:47

## 2020-10-05 RX ADMIN — POLYETHYLENE GLYCOL 3350 17 GRAM(S): 17 POWDER, FOR SOLUTION ORAL at 12:03

## 2020-10-05 RX ADMIN — Medication 650 MILLIGRAM(S): at 13:50

## 2020-10-05 RX ADMIN — HEPARIN SODIUM 7500 UNIT(S): 5000 INJECTION INTRAVENOUS; SUBCUTANEOUS at 14:22

## 2020-10-05 RX ADMIN — LACTULOSE 10 GRAM(S): 10 SOLUTION ORAL at 23:00

## 2020-10-05 RX ADMIN — Medication 650 MILLIGRAM(S): at 05:04

## 2020-10-05 RX ADMIN — OXYCODONE HYDROCHLORIDE 5 MILLIGRAM(S): 5 TABLET ORAL at 23:02

## 2020-10-05 RX ADMIN — Medication 5 MILLIGRAM(S): at 17:40

## 2020-10-05 RX ADMIN — CHLORHEXIDINE GLUCONATE 1 APPLICATION(S): 213 SOLUTION TOPICAL at 04:10

## 2020-10-05 RX ADMIN — Medication 5 MILLIGRAM(S): at 09:44

## 2020-10-05 RX ADMIN — Medication 650 MILLIGRAM(S): at 12:03

## 2020-10-05 RX ADMIN — Medication 650 MILLIGRAM(S): at 18:20

## 2020-10-05 RX ADMIN — Medication 5 MILLIGRAM(S): at 21:52

## 2020-10-05 RX ADMIN — HEPARIN SODIUM 5000 UNIT(S): 5000 INJECTION INTRAVENOUS; SUBCUTANEOUS at 05:05

## 2020-10-05 RX ADMIN — Medication 1 TABLET(S): at 12:03

## 2020-10-05 NOTE — PROGRESS NOTE ADULT - ASSESSMENT
Assesssment and Plan: 20 y/o male with recurrent CSF leak on POD #8  (had been repaired at time of initial surgery)  Now s/p primary repair of dura , application of duraseal and a fat plug. A spinal drain was placed for intermittant drainage of CSF    Neuro-  Pain control Tylenol standing/ Oxycodone 5mg/ 10 mg prn  Muscle spasm- Valium 5mg prn and Robaxin  Sensory and motor function intact  Lumbar drain to be opened q1hr and drain 15ml each hour  Management of drain as per NS    Pulm-  No chronic diagnosis  Currently on RA, saturating 93-95%  Encourage IS    Cardiology-  No chronic diagnosis  Cardiac monitoring    GI-  Contiue regular diet as tolerated  Protonix PPX  Avoid constipation  bisacodyl 5 milliGRAM(s) Oral at bedtime  polyethylene glycol 3350 17 Gram(s) Oral daily  senna 2 Tablet(s) Oral at bedtime PRN    Renal  MAGNUS 2/2 acetozolamide resolved with hydration  BUN/ creat back at baseline (1.0)  IVL  Voiding adequately  Lytes-Na 139 // K 4.1 // Phos 4.1 //  Mag 1.8    ID  Mild leukocytosis stable at 10.4 > 13  Ancef until drain is discontinued  No signs of infection    Heme  H&H at baseline 13  this is patient's baseline  HSQ for DVT PPX    MSK-  MUOÑZ, motor strength 5/5  bedrest with HOB at 90 degrees  or less    Endo  No chronic diagnosis  FS ac and qhs with coverage as needed    Dispo- SICU   A/P: 18 y/o male with recurrent CSF leak on POD #8  (had been repaired at time of initial surgery), POD#2 s/p primary repair of dura with application of duraseal and a fat plug & lumbar spinal drain for intermittent drainage of CSF    Neuro-  Pain control Tylenol standing/ Oxycodone 5mg/ 10 mg prn  Muscle spasm- Valium 5mg prn and Robaxin  Sensory and motor function intact  Lumbar drain to be opened q1hr and drain 10ml each hour per neurosurgery. Neuro checks hourly   Head of Bed to 90 degrees   Management of drain as per NS    Pulm-  No chronic diagnosis  Currently on RA, saturating 93-95%  Encourage IS use     Cardiology-  No chronic diagnosis  Cardiac monitoring    GI-  Diet: regular  GI PPX: Protonix   Avoid constipation, Bowel regimen:   bisacodyl 5 milliGRAM(s) Oral at bedtime  polyethylene glycol 3350 17 Gram(s) Oral daily  senna 2 Tablet(s) Oral at bedtime PRN    Renal  MAGNUS 2/2 acetozolamide resolved with hydration  BUN/ creat back at baseline (1.0)  IVL  Voiding adequately  Lytes-Na 139 // K 4.1 // Phos 4.1 //  Mag 1.8    ID  Mild leukocytosis stable at 10.4 > 13  Ancef until drain is discontinued  No signs of infection    Heme  H&H at baseline 13  this is patient's baseline  DVT PPX: Heparin subq     MSK-  MUÑOZ, motor strength 5/5 in BUE and 4/5 in BLE  Bedrest with HOB at 90 degrees  or less    Endo  No chronic diagnosis  FS ac and qhs with coverage as needed    Dispo- SICU with neurosurgery co-management

## 2020-10-05 NOTE — PROGRESS NOTE ADULT - SUBJECTIVE AND OBJECTIVE BOX
Overnight events:  POD #2 s/p wound exploration, CSF leak repair, lumbar drain placement  Patient doing well post-operatively. Sitting up to 60 degrees in bed. Denies headache, back pain. C/o persistent weakness of LEs, unchanged from prior to current admission.        Vital Signs Last 24 Hrs  T(C): 36.7 (05 Oct 2020 07:26), Max: 37.4 (04 Oct 2020 20:00)  T(F): 98.1 (05 Oct 2020 07:26), Max: 99.3 (04 Oct 2020 20:00)  HR: 89 (05 Oct 2020 10:00) (68 - 101)  BP: 128/81 (05 Oct 2020 10:00) (107/56 - 145/68)  BP(mean): 97 (05 Oct 2020 10:00) (78 - 104)  RR: 18 (05 Oct 2020 10:00) (18 - 19)  SpO2: 98% (05 Oct 2020 10:00) (97% - 99%)    PHYSICAL EXAM:  AOx3, NAD  speech clear,  CN II-XII WNL  FRANKI x4  Motor UE 5/5, LE 4/5   sens intact as per patient  lumbar drain in place          MEDICATIONS:  Antibiotics:  ceFAZolin   IVPB 1000 milliGRAM(s) IV Intermittent every 8 hours    Neuro:  acetaminophen   Tablet .. 650 milliGRAM(s) Oral every 6 hours  cyclobenzaprine 10 milliGRAM(s) Oral <User Schedule> PRN  diazepam    Tablet 5 milliGRAM(s) Oral <User Schedule>  melatonin 5 milliGRAM(s) Oral at bedtime  oxyCODONE    IR 5 milliGRAM(s) Oral every 6 hours PRN  oxyCODONE    IR 10 milliGRAM(s) Oral every 6 hours PRN    Anticoagulation:  heparin   Injectable 7500 Unit(s) SubCutaneous every 8 hours    OTHER:  bisacodyl 5 milliGRAM(s) Oral at bedtime  chlorhexidine 4% Liquid 1 Application(s) Topical <User Schedule>  influenza   Vaccine 0.5 milliLiter(s) IntraMuscular once  pantoprazole    Tablet 40 milliGRAM(s) Oral before breakfast  polyethylene glycol 3350 17 Gram(s) Oral daily  senna 2 Tablet(s) Oral at bedtime PRN    IVF:  multivitamin 1 Tablet(s) Oral daily        LABS:                        13.6   13.59 )-----------( 296      ( 05 Oct 2020 00:10 )             41.6     10-05    139  |  107  |  25<H>  ----------------------------<  127<H>  4.1   |  21  |  1.0    Ca    8.7      05 Oct 2020 00:10  Phos  4.1     10-05  Mg     1.8     10-05    TPro  6.7  /  Alb  3.9  /  TBili  0.5  /  DBili  x   /  AST  26  /  ALT  47<H>  /  AlkPhos  59  10-03    PT/INR - ( 05 Oct 2020 00:10 )   PT: 12.00 sec;   INR: 1.04 ratio         PTT - ( 03 Oct 2020 23:30 )  PTT:27.6 sec      RADIOLOGY:      Assessment:   18 yo male POD#2 s/p would exploration, dural leak repair, lumbar drain placement.      Plan:  - continue SICU monitoring  -drain 10 cc CSF every hour   -ok to raise HOB  to 90 degrees but keep in bed today  - pain control   - Bowel regimen   -on hep sub q for dvt/prophylaxis  -d/w attending

## 2020-10-05 NOTE — PROGRESS NOTE ADULT - SUBJECTIVE AND OBJECTIVE BOX
Ary name: Manohar Aguayo  Age 19 yrs    Indication for ICU admission:  Spinal drain  Admit Date:  10/2  ICU Date:      10/3  OR Date:      10/3    No Known Allergies    PAST MEDICAL & SURGICAL HISTORY:  Anxiety  S/P ACL repair  x2  History of tonsillectomy  Home Medications:     20 y/o male with recurrent CSF leak on POD #7  (had been repaired at time of initial surgery)  Now s/p primary repair of dura , application of duraseal and a fat plug  A spinal drain was placed for intermittant drainage of CSF    24 HOUR EVENTS:  During the Day:  Patient is doing well remains neurologically intact.  Able to raise HOB to 90 degrees  Continued hourly drainage for spinal drain with goal of 15 mleach hour    Overnight:  Received one dose of Clonapin 0.5 (according to pt and mothers takes it PRN at home)  Repleted 1g Mg sulfate    DVT PTX: heparin   Injectable 7500 Unit(s) SubCutaneous every 8 hours    GI PTX: pantoprazole    Tablet 40 milliGRAM(s) Oral before breakfast    ***Tubes/Lines/Drains  ***  Peripheral IV    [ x]A ten-point review of systems was otherwise negative except as noted above.  [ ]Due to altered mental status/intubation, subjective information was not attained from the patient.  History was obtained, to the extent possible, from review of the chart and collateral sources of information.      Ary name: Manohar Aguayo  Age 19 yrs    Indication for ICU admission:  Spinal drain  Admit Date:  10/2  ICU Date:      10/3  OR Date:      10/3    No Known Allergies    PAST MEDICAL & SURGICAL HISTORY:  Anxiety  S/P ACL repair  x2  History of tonsillectomy  Home Medications:     18 y/o male with recurrent CSF leak on POD #7  (had been repaired at time of initial surgery)  Now s/p primary repair of dura , application of duraseal and a fat plug  A spinal drain was placed for intermittant drainage of CSF    24 HOUR EVENTS:  During the Day:  Patient is doing well remains neurologically intact.  Able to raise HOB to 90 degrees  Continued hourly drainage for spinal drain with goal of 15 mleach hour    Overnight:  Received one dose of Clonapin 0.5 (according to pt and mothers takes it PRN at home)  Repleted 1g Mg sulfate    DVT PTX: heparin   Injectable 7500 Unit(s) SubCutaneous every 8 hours    GI PTX: pantoprazole    Tablet 40 milliGRAM(s) Oral before breakfast    ***Tubes/Lines/Drains  ***  Peripheral IV    [ x]A ten-point review of systems was otherwise negative except as noted above.  [ ]Due to altered mental status/intubation, subjective information was not attained from the patient.  History was obtained, to the extent possible, from review of the chart and collateral sources of information.     Daily     Daily Weight in k.1 (05 Oct 2020 04:00)    Diet, Regular (10-03-20 @ 12:55)      CURRENT MEDS:  Neurologic Medications  acetaminophen   Tablet .. 650 milliGRAM(s) Oral every 6 hours  cyclobenzaprine 10 milliGRAM(s) Oral <User Schedule> PRN Muscle Spasm  diazepam    Tablet 5 milliGRAM(s) Oral <User Schedule>  melatonin 5 milliGRAM(s) Oral at bedtime  oxyCODONE    IR 5 milliGRAM(s) Oral every 6 hours PRN Moderate Pain (4 - 6)  oxyCODONE    IR 10 milliGRAM(s) Oral every 6 hours PRN Severe Pain (7 - 10)    Respiratory Medications    Cardiovascular Medications    Gastrointestinal Medications  bisacodyl 5 milliGRAM(s) Oral at bedtime  multivitamin 1 Tablet(s) Oral daily  pantoprazole    Tablet 40 milliGRAM(s) Oral before breakfast  polyethylene glycol 3350 17 Gram(s) Oral daily  senna 2 Tablet(s) Oral at bedtime PRN Constipation    Genitourinary Medications    Hematologic/Oncologic Medications  heparin   Injectable 7500 Unit(s) SubCutaneous every 8 hours  influenza   Vaccine 0.5 milliLiter(s) IntraMuscular once    Antimicrobial/Immunologic Medications  ceFAZolin   IVPB 1000 milliGRAM(s) IV Intermittent every 8 hours    Endocrine/Metabolic Medications    Topical/Other Medications  chlorhexidine 4% Liquid 1 Application(s) Topical <User Schedule>      ICU Vital Signs Last 24 Hrs  T(C): 36.7 (05 Oct 2020 07:26), Max: 37.4 (04 Oct 2020 20:00)  T(F): 98.1 (05 Oct 2020 07:26), Max: 99.3 (04 Oct 2020 20:00)  HR: 94 (05 Oct 2020 11:) (68 - 101)  BP: 125/90 (05 Oct 2020 11:) (107/56 - 145/68)  BP(mean): 99 (05 Oct 2020 11:) (78 - 104)  RR: 20 (05 Oct 2020 11:) (18 - 20)  SpO2: 99% (05 Oct 2020 11:) (97% - 99%)      I&O's Summary    04 Oct 2020 07:  -  05 Oct 2020 07:00  --------------------------------------------------------  IN: 300 mL / OUT: 2060 mL / NET: -1760 mL    05 Oct 2020 07:  -  05 Oct 2020 11:13  --------------------------------------------------------  IN: 520 mL / OUT: 915 mL / NET: -395 mL      I&O's Detail    04 Oct 2020 07:01  -  05 Oct 2020 07:00  --------------------------------------------------------  IN:    Oral Fluid: 300 mL  Total IN: 300 mL    OUT:    External Ventricular Device (mL): 360 mL    Voided (mL): 1700 mL  Total OUT: 2060 mL    Total NET: -1760 mL      05 Oct 2020 07:01  -  05 Oct 2020 11:13  --------------------------------------------------------  IN:    Oral Fluid: 520 mL  Total IN: 520 mL    OUT:    External Ventricular Device (mL): 65 mL    Voided (mL): 850 mL  Total OUT: 915 mL    Total NET: -395 mL          PHYSICAL EXAM:    General/Neuro  GCS: 15   Alert & oriented x 3, no focal deficits. No acute distress    Lungs:  Clear to auscultation, Normal expansion/effort.     Cardiovascular : S1, S2.  Regular rate and rhythm.  Peripheral edema   Cardiac Rhythm: Normal Sinus Rhythm    GI: Abdomen soft, Non-tender, Non-distended.      Wound: Lumbar drain in place     MSK: UE 5/5, LE 4/5. Lumbar drain in place     Extremities: Extremities warm, pink, well-perfused. SCDs on lower extremities     Derm: Good skin turgor, no skin breakdown.      : Deferred. Voiding       CXR:   < from: Xray Chest 1 View- PORTABLE-Routine (10.05.20 @ 06:00) >  EXAM:  XR CHEST PORTABLE ROUTINE 1V        PROCEDURE DATE:  10/05/2020    INTERPRETATION:  Clinical History / Reason for exam: Recurrent CSF leak.  Comparison : Chest radiograph 10/4/2020.  Technique/Positioning: Single AP chest radiograph.  Findings:    Support devices: None.    Cardiac/mediastinum/hilum: Stable.    Lung parenchyma/Pleura: Low lung volumes. There is blunting of the left costophrenic angle which may be related to atelectasis/effusion. No pneumothorax.    Skeleton/soft tissues: Stable.    Impression:    Mild blunting of the left costophrenic angle which may be related to atelectasis/effusion. Low lung volumes.  < end of copied text >      LABS:  CAPILLARY BLOOD GLUCOSE                              13.6   13.59 )-----------( 296      ( 05 Oct 2020 00:10 )             41.6       10-05    139  |  107  |  25<H>  ----------------------------<  127<H>  4.1   |  21  |  1.0    Ca    8.7      05 Oct 2020 00:10  Phos  4.1     10-05  Mg     1.8     10-    TPro  6.7  /  Alb  3.9  /  TBili  0.5  /  DBili  x   /  AST  26  /  ALT  47<H>  /  AlkPhos  59  10-03      PT/INR - ( 05 Oct 2020 00:10 )   PT: 12.00 sec;   INR: 1.04 ratio         PTT - ( 03 Oct 2020 23:30 )  PTT:27.6 sec

## 2020-10-06 ENCOUNTER — TRANSCRIPTION ENCOUNTER (OUTPATIENT)
Age: 20
End: 2020-10-06

## 2020-10-06 LAB
ANION GAP SERPL CALC-SCNC: 11 MMOL/L — SIGNIFICANT CHANGE UP (ref 7–14)
BUN SERPL-MCNC: 23 MG/DL — HIGH (ref 10–20)
CALCIUM SERPL-MCNC: 9.1 MG/DL — SIGNIFICANT CHANGE UP (ref 8.5–10.1)
CHLORIDE SERPL-SCNC: 105 MMOL/L — SIGNIFICANT CHANGE UP (ref 98–110)
CO2 SERPL-SCNC: 21 MMOL/L — SIGNIFICANT CHANGE UP (ref 17–32)
CREAT SERPL-MCNC: 1 MG/DL — SIGNIFICANT CHANGE UP (ref 0.3–1)
GLUCOSE SERPL-MCNC: 113 MG/DL — HIGH (ref 70–99)
HCT VFR BLD CALC: 39.8 % — LOW (ref 42–52)
HGB BLD-MCNC: 13.3 G/DL — LOW (ref 14–18)
MAGNESIUM SERPL-MCNC: 1.9 MG/DL — SIGNIFICANT CHANGE UP (ref 1.8–2.4)
MCHC RBC-ENTMCNC: 29.6 PG — SIGNIFICANT CHANGE UP (ref 27–31)
MCHC RBC-ENTMCNC: 33.4 G/DL — SIGNIFICANT CHANGE UP (ref 32–37)
MCV RBC AUTO: 88.6 FL — SIGNIFICANT CHANGE UP (ref 80–94)
NRBC # BLD: 0 /100 WBCS — SIGNIFICANT CHANGE UP (ref 0–0)
PHOSPHATE SERPL-MCNC: 4.5 MG/DL — SIGNIFICANT CHANGE UP (ref 2.1–4.9)
PLATELET # BLD AUTO: 291 K/UL — SIGNIFICANT CHANGE UP (ref 130–400)
POTASSIUM SERPL-MCNC: 4.3 MMOL/L — SIGNIFICANT CHANGE UP (ref 3.5–5)
POTASSIUM SERPL-SCNC: 4.3 MMOL/L — SIGNIFICANT CHANGE UP (ref 3.5–5)
RBC # BLD: 4.49 M/UL — LOW (ref 4.7–6.1)
RBC # FLD: 12.9 % — SIGNIFICANT CHANGE UP (ref 11.5–14.5)
SODIUM SERPL-SCNC: 137 MMOL/L — SIGNIFICANT CHANGE UP (ref 135–146)
WBC # BLD: 12.69 K/UL — HIGH (ref 4.8–10.8)
WBC # FLD AUTO: 12.69 K/UL — HIGH (ref 4.8–10.8)

## 2020-10-06 PROCEDURE — 93970 EXTREMITY STUDY: CPT | Mod: 26

## 2020-10-06 PROCEDURE — 71045 X-RAY EXAM CHEST 1 VIEW: CPT | Mod: 26

## 2020-10-06 PROCEDURE — 99291 CRITICAL CARE FIRST HOUR: CPT

## 2020-10-06 RX ORDER — LACTULOSE 10 G/15ML
10 SOLUTION ORAL EVERY 8 HOURS
Refills: 0 | Status: DISCONTINUED | OUTPATIENT
Start: 2020-10-06 | End: 2020-10-07

## 2020-10-06 RX ORDER — MAGNESIUM HYDROXIDE 400 MG/1
30 TABLET, CHEWABLE ORAL DAILY
Refills: 0 | Status: DISCONTINUED | OUTPATIENT
Start: 2020-10-06 | End: 2020-10-08

## 2020-10-06 RX ORDER — HYDROMORPHONE HYDROCHLORIDE 2 MG/ML
0.5 INJECTION INTRAMUSCULAR; INTRAVENOUS; SUBCUTANEOUS EVERY 4 HOURS
Refills: 0 | Status: DISCONTINUED | OUTPATIENT
Start: 2020-10-06 | End: 2020-10-06

## 2020-10-06 RX ORDER — SENNA PLUS 8.6 MG/1
2 TABLET ORAL AT BEDTIME
Refills: 0 | Status: DISCONTINUED | OUTPATIENT
Start: 2020-10-06 | End: 2020-10-12

## 2020-10-06 RX ORDER — LACTULOSE 10 G/15ML
10 SOLUTION ORAL DAILY
Refills: 0 | Status: DISCONTINUED | OUTPATIENT
Start: 2020-10-06 | End: 2020-10-06

## 2020-10-06 RX ORDER — ACETAMINOPHEN 500 MG
650 TABLET ORAL EVERY 6 HOURS
Refills: 0 | Status: COMPLETED | OUTPATIENT
Start: 2020-10-06 | End: 2020-10-09

## 2020-10-06 RX ORDER — MAGNESIUM SULFATE 500 MG/ML
1 VIAL (ML) INJECTION ONCE
Refills: 0 | Status: COMPLETED | OUTPATIENT
Start: 2020-10-06 | End: 2020-10-06

## 2020-10-06 RX ADMIN — HEPARIN SODIUM 7500 UNIT(S): 5000 INJECTION INTRAVENOUS; SUBCUTANEOUS at 06:11

## 2020-10-06 RX ADMIN — OXYCODONE HYDROCHLORIDE 10 MILLIGRAM(S): 5 TABLET ORAL at 23:00

## 2020-10-06 RX ADMIN — Medication 100 MILLIGRAM(S): at 13:28

## 2020-10-06 RX ADMIN — Medication 650 MILLIGRAM(S): at 23:12

## 2020-10-06 RX ADMIN — Medication 5 MILLIGRAM(S): at 17:25

## 2020-10-06 RX ADMIN — Medication 5 MILLIGRAM(S): at 09:55

## 2020-10-06 RX ADMIN — Medication 50 GRAM(S): at 05:12

## 2020-10-06 RX ADMIN — Medication 650 MILLIGRAM(S): at 06:11

## 2020-10-06 RX ADMIN — Medication 5 MILLIGRAM(S): at 21:09

## 2020-10-06 RX ADMIN — PANTOPRAZOLE SODIUM 40 MILLIGRAM(S): 20 TABLET, DELAYED RELEASE ORAL at 06:11

## 2020-10-06 RX ADMIN — SENNA PLUS 2 TABLET(S): 8.6 TABLET ORAL at 21:05

## 2020-10-06 RX ADMIN — OXYCODONE HYDROCHLORIDE 5 MILLIGRAM(S): 5 TABLET ORAL at 06:12

## 2020-10-06 RX ADMIN — LACTULOSE 10 GRAM(S): 10 SOLUTION ORAL at 13:30

## 2020-10-06 RX ADMIN — Medication 5 MILLIGRAM(S): at 23:12

## 2020-10-06 RX ADMIN — POLYETHYLENE GLYCOL 3350 17 GRAM(S): 17 POWDER, FOR SOLUTION ORAL at 12:19

## 2020-10-06 RX ADMIN — HEPARIN SODIUM 7500 UNIT(S): 5000 INJECTION INTRAVENOUS; SUBCUTANEOUS at 13:30

## 2020-10-06 RX ADMIN — OXYCODONE HYDROCHLORIDE 10 MILLIGRAM(S): 5 TABLET ORAL at 17:25

## 2020-10-06 RX ADMIN — Medication 100 MILLIGRAM(S): at 06:11

## 2020-10-06 RX ADMIN — Medication 1 TABLET(S): at 12:19

## 2020-10-06 RX ADMIN — CHLORHEXIDINE GLUCONATE 1 APPLICATION(S): 213 SOLUTION TOPICAL at 05:11

## 2020-10-06 RX ADMIN — OXYCODONE HYDROCHLORIDE 10 MILLIGRAM(S): 5 TABLET ORAL at 23:34

## 2020-10-06 RX ADMIN — Medication 10 MILLIGRAM(S): at 16:30

## 2020-10-06 RX ADMIN — Medication 100 MILLIGRAM(S): at 21:09

## 2020-10-06 RX ADMIN — HEPARIN SODIUM 7500 UNIT(S): 5000 INJECTION INTRAVENOUS; SUBCUTANEOUS at 21:08

## 2020-10-06 RX ADMIN — Medication 650 MILLIGRAM(S): at 12:47

## 2020-10-06 RX ADMIN — Medication 650 MILLIGRAM(S): at 12:20

## 2020-10-06 RX ADMIN — OXYCODONE HYDROCHLORIDE 5 MILLIGRAM(S): 5 TABLET ORAL at 06:50

## 2020-10-06 RX ADMIN — LACTULOSE 10 GRAM(S): 10 SOLUTION ORAL at 21:05

## 2020-10-06 RX ADMIN — OXYCODONE HYDROCHLORIDE 10 MILLIGRAM(S): 5 TABLET ORAL at 18:11

## 2020-10-06 RX ADMIN — Medication 650 MILLIGRAM(S): at 18:10

## 2020-10-06 RX ADMIN — Medication 650 MILLIGRAM(S): at 17:25

## 2020-10-06 NOTE — PROGRESS NOTE ADULT - SUBJECTIVE AND OBJECTIVE BOX
Subjective: 19yMale with a pmhx of BACK PAIN, HEADACHE    ^BACK PAIN, HEADACHE    Family history of diabetes mellitus (DM)    Handoff    MEWS Score    Anxiety    Anxiety    Depression    No pertinent past medical history    Postoperative CSF leak    Postoperative CSF leak    Back pain    Exploration, wound, lumbar region, with repair of dural defect if indicated, for CSF leak    S/P ACL repair    History of tonsillectomy    BACK SURGERY COMPLICATIONS    3    Headache    [email or web address]       POD #2 s/p wound exploration, CSF leak repair, lumbar drain placement  Patient doing well post-operatively. Sitting up to 60 degrees in bed. Denies headache, back pain. C/o persistent weakness of LEs, unchanged from prior to current admission.    Allergies    No Known Allergies    Intolerances        Vital Signs Last 24 Hrs  T(C): 35.7 (06 Oct 2020 08:00), Max: 37.3 (05 Oct 2020 20:00)  T(F): 96.2 (06 Oct 2020 08:00), Max: 99.2 (05 Oct 2020 20:00)  HR: 99 (06 Oct 2020 11:15) (80 - 124)  BP: 148/76 (06 Oct 2020 11:15) (114/62 - 162/88)  BP(mean): 104 (06 Oct 2020 11:15) (81 - 111)  RR: 18 (06 Oct 2020 08:00) (16 - 22)  SpO2: 100% (06 Oct 2020 11:15) (97% - 100%)      acetaminophen   Tablet .. 650 milliGRAM(s) Oral every 6 hours  bisacodyl 5 milliGRAM(s) Oral at bedtime  ceFAZolin   IVPB 1000 milliGRAM(s) IV Intermittent every 8 hours  chlorhexidine 4% Liquid 1 Application(s) Topical <User Schedule>  cyclobenzaprine 10 milliGRAM(s) Oral <User Schedule> PRN  diazepam    Tablet 5 milliGRAM(s) Oral <User Schedule>  heparin   Injectable 7500 Unit(s) SubCutaneous every 8 hours  influenza   Vaccine 0.5 milliLiter(s) IntraMuscular once  lactulose Syrup 10 Gram(s) Oral every 8 hours  melatonin 5 milliGRAM(s) Oral at bedtime  multivitamin 1 Tablet(s) Oral daily  oxyCODONE    IR 5 milliGRAM(s) Oral every 6 hours PRN  oxyCODONE    IR 10 milliGRAM(s) Oral every 6 hours PRN  pantoprazole    Tablet 40 milliGRAM(s) Oral before breakfast  polyethylene glycol 3350 17 Gram(s) Oral daily  senna 2 Tablet(s) Oral at bedtime        10-05-20 @ 07:01  -  10-06-20 @ 07:00  --------------------------------------------------------  IN: 1240 mL / OUT: 2995 mL / NET: -1755 mL    10-06-20 @ 07:01  -  10-06-20 @ 12:14  --------------------------------------------------------  IN: 0 mL / OUT: 40 mL / NET: -40 mL        REVIEW OF SYSTEMS    [ X ] A ten-point review of systems was otherwise negative except as noted.  [ ] Due to altered mental status/intubation, subjective information were not able to be obtained from the patient. History was obtained, to the extent possible, from review of the chart and collateral sources of information.      PHYSICAL EXAM:  AOx3, NAD  speech clear,  FRANKI x4  Motor UE 5/5, LE 4/5   sensation intact as per patient  lumbar drain in place        OUT:    External Ventricular Device (mL): 245 mL          CBC Full  -  ( 06 Oct 2020 00:09 )  WBC Count : 12.69 K/uL  RBC Count : 4.49 M/uL  Hemoglobin : 13.3 g/dL  Hematocrit : 39.8 %  Platelet Count - Automated : 291 K/uL  Mean Cell Volume : 88.6 fL  Mean Cell Hemoglobin : 29.6 pg  Mean Cell Hemoglobin Concentration : 33.4 g/dL    10-06    137  |  105  |  23<H>  ----------------------------<  113<H>  4.3   |  21  |  1.0    Ca    9.1      06 Oct 2020 00:09  Phos  4.5     10-06  Mg     1.9     10-06      PT/INR - ( 05 Oct 2020 00:10 )   PT: 12.00 sec;   INR: 1.04 ratio         Imaging:    < from: Xray Chest 1 View- PORTABLE-Routine (10.06.20 @ 06:19) >  Impression:    Low lung volumes with no radiographic evidence of acute cardiopulmonary disease.    < end of copied text >    Assessment/Plan:   s/p placement of lumbar drain  -drain 10 cc CSF every hour   -ok to raise HOB  to 90 degrees and dangle legs from bed, but still keep in bed today  - pain control   - aggressive Bowel regimen   -on hep sub q for dvt/prophylaxis  -d/w attending

## 2020-10-06 NOTE — PROGRESS NOTE ADULT - ASSESSMENT
Assesssment and Plan: 20 y/o male with recurrent CSF leak on POD #9 (had been repaired at time of initial surgery)  Now s/p primary repair of dura , application of duraseal and a fat plug  A spinal drain was placed for intermittant drainage of CSF    Neuro-  Pain control Tylenol standing/ Oxycodone 5mg/ 10 mg prn  Muscle spasm- Valium 5mg prn and Robaxin  Sensory and motor function intact  Lumbar drain to be opened q1hr and drain -10ml/hour  Management of drain as per NS    Pulm-  No chronic diagnosis  Currently on RA, saturating 93-95%  Encourage IS use    Cardiology-  No chronic diagnosis  Cardiac monitoring    GI-  Contiue regular diet as tolerated  Protonix PPX  Avoid constipation  bisacodyl 5 milliGRAM(s) Oral at bedtime  polyethylene glycol 3350 17 Gram(s) Oral daily  senna 2 Tablet(s) Oral at bedtime PRN    Renal  MAGNUS 2/2 acetozolamide resolved with hydration  BUN/ creat back at baseline (1.0)  IVL  Voiding adequately  Lytes-Na 139 // K 4.1 // Phos 4.1 //  Mag 1.8. Mg rider given for repletion     ID  Mild leukocytosis stable at 10.4 > 13  Ancef until drain is discontinued  No signs of infection    Heme  H&H at baseline 13  this is patient's baseline  DVT PPX: herpain subq     MSK-  MUÑOZ, motor strength 5/5 of BUE and 4/5 BLE  Activity: Bedrest with HOB at 90 degrees  or less    Endo  No chronic diagnosis  FS ac and qhs with coverage as needed    Dispo- SICU with neurosurg management                      Assesssment and Plan: 18 y/o male with recurrent CSF leak on POD #11 (had been repaired at time of initial surgery), now POD#3  s/p primary repair of dura , application of duraseal and a fat plug with intermittent drainage of CSF    Neuro-  Pain control Tylenol standing/ Oxycodone 5mg/ 10 mg prn  Muscle spasm- Valium 5mg prn and Robaxin  Sensory and motor function intact  Lumbar drain to be opened q1hr and drain -10ml/hour  Management of drain as per NS    Pulm-  No chronic diagnosis  Currently on RA, saturating 93-95%  Encourage IS use    Cardiology-  No chronic diagnosis  Cardiac monitoring    GI-  Contiue regular diet as tolerated  Protonix PPX  Per pt no bowel movement, increase bowel regimen and include enema prn.   bisacodyl 5 milliGRAM(s) Oral at bedtime  polyethylene glycol 3350 17 Gram(s) Oral daily  senna 2 Tablet(s) Oral at bedtime PRN    Renal  MAGNUS 2/2 acetozolamide resolved with hydration  BUN/ creat back at baseline (1.0)  IVL  Voiding adequately  Lytes-Na 139 // K 4.1 // Phos 4.1 //  Mag 1.8. Mg rider given for repletion     ID  Mild leukocytosis stable at 10.4 > 13  Ancef until drain is discontinued  No signs of infection    Heme  H&H at baseline 13  this is patient's baseline  DVT PPX: heparin subq     MSK-  MUÑOZ, motor strength 5/5 of BUE and 4/5 BLE  Activity: Bedrest with HOB at 90 degrees  or less    Endo  No chronic diagnosis  FS ac and qhs with coverage as needed    Dispo- SICU with neurosurg management      Gave aware

## 2020-10-06 NOTE — DISCHARGE NOTE NURSING/CASE MANAGEMENT/SOCIAL WORK - FLU SEASON?
[FreeTextEntry1] : ELISA VALVERDE is having right sided facial pressure and mild right facial edema. I am not sure the exact cause. I will send her for Sinus CT. Yes...

## 2020-10-06 NOTE — DISCHARGE NOTE NURSING/CASE MANAGEMENT/SOCIAL WORK - PATIENT PORTAL LINK FT
You can access the FollowMyHealth Patient Portal offered by [facility] by registering at the following website: [email or web address]. By joining [employer]’s FollowMyHealth portal, you will also be able to view your health information using other applications (apps) compatible with our system.

## 2020-10-07 LAB
ANION GAP SERPL CALC-SCNC: 12 MMOL/L — SIGNIFICANT CHANGE UP (ref 7–14)
APTT BLD: 30.1 SEC — SIGNIFICANT CHANGE UP (ref 27–39.2)
BUN SERPL-MCNC: 21 MG/DL — HIGH (ref 10–20)
CALCIUM SERPL-MCNC: 8.6 MG/DL — SIGNIFICANT CHANGE UP (ref 8.5–10.1)
CHLORIDE SERPL-SCNC: 106 MMOL/L — SIGNIFICANT CHANGE UP (ref 98–110)
CO2 SERPL-SCNC: 21 MMOL/L — SIGNIFICANT CHANGE UP (ref 17–32)
CREAT SERPL-MCNC: 0.9 MG/DL — SIGNIFICANT CHANGE UP (ref 0.3–1)
GLUCOSE SERPL-MCNC: 132 MG/DL — HIGH (ref 70–99)
HCT VFR BLD CALC: 40.9 % — LOW (ref 42–52)
HGB BLD-MCNC: 13.5 G/DL — LOW (ref 14–18)
INR BLD: 1.02 RATIO — SIGNIFICANT CHANGE UP (ref 0.65–1.3)
MAGNESIUM SERPL-MCNC: 1.8 MG/DL — SIGNIFICANT CHANGE UP (ref 1.8–2.4)
MCHC RBC-ENTMCNC: 29.7 PG — SIGNIFICANT CHANGE UP (ref 27–31)
MCHC RBC-ENTMCNC: 33 G/DL — SIGNIFICANT CHANGE UP (ref 32–37)
MCV RBC AUTO: 90.1 FL — SIGNIFICANT CHANGE UP (ref 80–94)
NRBC # BLD: 0 /100 WBCS — SIGNIFICANT CHANGE UP (ref 0–0)
PHOSPHATE SERPL-MCNC: 4.3 MG/DL — SIGNIFICANT CHANGE UP (ref 2.1–4.9)
PLATELET # BLD AUTO: 282 K/UL — SIGNIFICANT CHANGE UP (ref 130–400)
POTASSIUM SERPL-MCNC: 4.4 MMOL/L — SIGNIFICANT CHANGE UP (ref 3.5–5)
POTASSIUM SERPL-SCNC: 4.4 MMOL/L — SIGNIFICANT CHANGE UP (ref 3.5–5)
PROTHROM AB SERPL-ACNC: 11.7 SEC — SIGNIFICANT CHANGE UP (ref 9.95–12.87)
RBC # BLD: 4.54 M/UL — LOW (ref 4.7–6.1)
RBC # FLD: 13 % — SIGNIFICANT CHANGE UP (ref 11.5–14.5)
SODIUM SERPL-SCNC: 139 MMOL/L — SIGNIFICANT CHANGE UP (ref 135–146)
WBC # BLD: 11.38 K/UL — HIGH (ref 4.8–10.8)
WBC # FLD AUTO: 11.38 K/UL — HIGH (ref 4.8–10.8)

## 2020-10-07 PROCEDURE — 99291 CRITICAL CARE FIRST HOUR: CPT

## 2020-10-07 PROCEDURE — 71045 X-RAY EXAM CHEST 1 VIEW: CPT | Mod: 26

## 2020-10-07 RX ORDER — MAGNESIUM SULFATE 500 MG/ML
1 VIAL (ML) INJECTION ONCE
Refills: 0 | Status: COMPLETED | OUTPATIENT
Start: 2020-10-07 | End: 2020-10-07

## 2020-10-07 RX ORDER — LACTULOSE 10 G/15ML
10 SOLUTION ORAL EVERY 6 HOURS
Refills: 0 | Status: DISCONTINUED | OUTPATIENT
Start: 2020-10-07 | End: 2020-10-12

## 2020-10-07 RX ADMIN — Medication 650 MILLIGRAM(S): at 11:40

## 2020-10-07 RX ADMIN — Medication 650 MILLIGRAM(S): at 05:36

## 2020-10-07 RX ADMIN — Medication 650 MILLIGRAM(S): at 18:49

## 2020-10-07 RX ADMIN — Medication 1 TABLET(S): at 11:40

## 2020-10-07 RX ADMIN — Medication 5 MILLIGRAM(S): at 11:25

## 2020-10-07 RX ADMIN — Medication 650 MILLIGRAM(S): at 23:03

## 2020-10-07 RX ADMIN — Medication 100 MILLIGRAM(S): at 21:19

## 2020-10-07 RX ADMIN — Medication 650 MILLIGRAM(S): at 12:10

## 2020-10-07 RX ADMIN — Medication 5 MILLIGRAM(S): at 21:17

## 2020-10-07 RX ADMIN — CHLORHEXIDINE GLUCONATE 1 APPLICATION(S): 213 SOLUTION TOPICAL at 05:35

## 2020-10-07 RX ADMIN — OXYCODONE HYDROCHLORIDE 10 MILLIGRAM(S): 5 TABLET ORAL at 11:55

## 2020-10-07 RX ADMIN — PANTOPRAZOLE SODIUM 40 MILLIGRAM(S): 20 TABLET, DELAYED RELEASE ORAL at 07:45

## 2020-10-07 RX ADMIN — Medication 50 GRAM(S): at 04:48

## 2020-10-07 RX ADMIN — Medication 5 MILLIGRAM(S): at 18:49

## 2020-10-07 RX ADMIN — Medication 650 MILLIGRAM(S): at 06:00

## 2020-10-07 RX ADMIN — SENNA PLUS 2 TABLET(S): 8.6 TABLET ORAL at 22:46

## 2020-10-07 RX ADMIN — Medication 100 MILLIGRAM(S): at 05:41

## 2020-10-07 RX ADMIN — HEPARIN SODIUM 7500 UNIT(S): 5000 INJECTION INTRAVENOUS; SUBCUTANEOUS at 05:39

## 2020-10-07 RX ADMIN — HEPARIN SODIUM 7500 UNIT(S): 5000 INJECTION INTRAVENOUS; SUBCUTANEOUS at 21:17

## 2020-10-07 RX ADMIN — Medication 100 MILLIGRAM(S): at 13:39

## 2020-10-07 RX ADMIN — HEPARIN SODIUM 7500 UNIT(S): 5000 INJECTION INTRAVENOUS; SUBCUTANEOUS at 13:39

## 2020-10-07 RX ADMIN — Medication 650 MILLIGRAM(S): at 00:00

## 2020-10-07 RX ADMIN — OXYCODONE HYDROCHLORIDE 10 MILLIGRAM(S): 5 TABLET ORAL at 11:25

## 2020-10-07 NOTE — PROGRESS NOTE ADULT - ASSESSMENT
Patient states that she will bring in the Focalin to surrender    States that she had spoke with [employer], cash price for Vyvanse is $370   Assesssment and Plan  19y Male with history of TLIF presents with CSF leak, now s/p repair with Lumbar drain.     Neuro-  Pain control Tylenol standing/ Oxycodone 5mg/ 10 mg prn  Muscle spasm- Valium 5mg prn and Robaxin  Sensory and motor function intact  Lumbar drain to be opened q1hr and drain -10ml/hour   - plan to keep drain in x4 more days  - continue q1 neurochecks  Management of drain as per NS    Pulm-  No chronic diagnosis  Currently on RA, saturating 93-95%  Encourage IS use    Cardiology-  No chronic diagnosis  Cardiac monitoring    GI-  Contiue regular diet as tolerated  Protonix PPX  Avoid constipation. Senna, colace, miralax, lactulose prn for constipation  -No bowel movement as of 0900 on 10/6  bisacodyl 5 milliGRAM(s) Oral at bedtime  polyethylene glycol 3350 17 Gram(s) Oral daily  senna 2 Tablet(s) Oral at bedtime PRN    Renal  MAGNUS 2/2 acetozolamide resolved with hydration  BUN/ creat back at baseline (1.0)  IVL  Voiding adequately   Lytes- Na- 139, K-4.4, Mg- 1.8, Phos- 4.3    ID  Mild leukocytosis stable   Ancef until drain is discontinued  No signs of infection    Heme  H&H at baseline 13  this is patient's baseline  DVT PPX: herpain subq   f/u DVT sono     MSK-  MUÑOZ, motor strength 5/5 of BUE and 4/5 BLE  Activity: Bedrest with HOB at 90 degrees  or less    Endo  No chronic diagnosis  FS ac and qhs with coverage as needed Assesssment and Plan  19y Male with history of TLIF presents with CSF leak, now s/p repair with Lumbar drain.     Neuro-  Pain control Tylenol standing/ Oxycodone 5mg/ 10 mg prn  Muscle spasm- Valium 5mg prn and Robaxin  Sensory and motor function intact  Lumbar drain to be opened q1hr and drain -10ml/hour   - plan to keep drain in x 4 more days  - continue q1 neurochecks  Management of drain as per NS    Pulm-  No chronic diagnosis  Currently on RA, saturating 93-95%  Encourage IS use    Cardiology-  No chronic diagnosis  Cardiac monitoring    GI-  Contiue regular diet as tolerated  Protonix PPX  Avoid constipation. Senna, colace, miralax, lactulose prn for constipation  -No bowel movement as of 0900 on 10/6  bisacodyl 5 milliGRAM(s) Oral at bedtime  polyethylene glycol 3350 17 Gram(s) Oral daily  senna 2 Tablet(s) Oral at bedtime PRN    Renal  MAGNUS 2/2 acetozolamide resolved with hydration  BUN/ creat back at baseline (0.9)  IVL  Voiding adequately   Lytes- Na- 139, K-4.4, Mg- 1.8, Phos- 4.3    ID  Mild leukocytosis stabl, WBC 11.4  Ancef until drain is discontinued  No signs of infection  Afebrile    Heme  H&H at baseline 13.5  this is patient's baseline  DVT PPX: herpain subq   f/u DVT sono     MSK-  MUÑOZ, motor strength 5/5 of BUE and 5/5 BLE  Activity: Bedrest with HOB at 90 degrees  or less    Endo  No chronic diagnosis  FS ac and qhs with coverage as needed    Dispo: SICU care

## 2020-10-07 NOTE — PROGRESS NOTE ADULT - SUBJECTIVE AND OBJECTIVE BOX
Overnight events:  POD #3 s/p wound exploration, CSF leak repair, lumbar drain placement  Patient doing well post-operatively. Sitting up to 90 degrees in bed. Denies headache, back pain. C/o persistent weakness of LEs, improved from yesterday.           Vital Signs Last 24 Hrs  T(C): 37.1 (07 Oct 2020 14:00), Max: 37.4 (07 Oct 2020 02:00)  T(F): 98.7 (07 Oct 2020 14:00), Max: 99.3 (07 Oct 2020 02:00)  HR: 106 (07 Oct 2020 16:00) (83 - 110)  BP: 140/79 (07 Oct 2020 16:00) (104/50 - 155/82)  BP(mean): 100 (07 Oct 2020 16:00) (72 - 109)  RR: 20 (07 Oct 2020 16:00) (16 - 20)  SpO2: 99% (07 Oct 2020 16:00) (97% - 100%)    PHYSICAL EXAM:    speech clear,  FRANKI x4  Motor UE 5/5, LE 4/5   sensation intact as per patient  lumbar drain in place,   stomach distended, no tenderness to palpation            MEDICATIONS:  Antibiotics:  ceFAZolin   IVPB 1000 milliGRAM(s) IV Intermittent every 8 hours    Neuro:  acetaminophen   Tablet .. 650 milliGRAM(s) Oral every 6 hours  cyclobenzaprine 10 milliGRAM(s) Oral <User Schedule> PRN  diazepam    Tablet 5 milliGRAM(s) Oral <User Schedule>  melatonin 5 milliGRAM(s) Oral at bedtime    Anticoagulation:  heparin   Injectable 7500 Unit(s) SubCutaneous every 8 hours    OTHER:  bisacodyl Suppository 10 milliGRAM(s) Rectal daily  chlorhexidine 4% Liquid 1 Application(s) Topical <User Schedule>  influenza   Vaccine 0.5 milliLiter(s) IntraMuscular once  lactulose Syrup 10 Gram(s) Oral every 6 hours  magnesium hydroxide Suspension 30 milliLiter(s) Oral daily PRN  pantoprazole    Tablet 40 milliGRAM(s) Oral before breakfast  polyethylene glycol 3350 17 Gram(s) Oral daily  senna 2 Tablet(s) Oral at bedtime    IVF:  multivitamin 1 Tablet(s) Oral daily        LABS:                        13.5   11.38 )-----------( 282      ( 06 Oct 2020 23:41 )             40.9     10-06    139  |  106  |  21<H>  ----------------------------<  132<H>  4.4   |  21  |  0.9    Ca    8.6      06 Oct 2020 23:41  Phos  4.3     10-06  Mg     1.8     10-06      PT/INR - ( 06 Oct 2020 23:41 )   PT: 11.70 sec;   INR: 1.02 ratio         PTT - ( 06 Oct 2020 23:41 )  PTT:30.1 sec      RADIOLOGY:        Assessment/Plan:   s/p placement of lumbar drain  -drain 10 cc CSF every hour   -ok to raise HOB  to 90 degrees and dangle legs from bed, ok for bedside commode transfer  - pain control   - aggressive Bowel regimen   -on hep sub q for dvt/prophylaxis  -d/w attending

## 2020-10-07 NOTE — PROGRESS NOTE ADULT - SUBJECTIVE AND OBJECTIVE BOX
Ary name: Manohar Aguayo  Age 19 yrs    Indication for ICU admission:  Spinal drain  Admit Date:  10/2  ICU Date:      10/3  OR Date:      10/3    No Known Allergies    PAST MEDICAL & SURGICAL HISTORY:  Anxiety  S/P ACL repair  x2  History of tonsillectomy  Home Medications:       20 y/o male with recurrent CSF leak on POD #7  (had been repaired at time of initial surgery)  Now s/p primary repair of dura , application of duraseal and a fat plug  A spinal drain was placed for intermittant drainage of CSF    24 HOUR EVENTS:  No acute events. Increased bowel regimen, f/u BM. Continue bedrest and drain -10cc/hr. DVT sono ordered to R/O DVT due to multiple recent surgeries and bedrest. No acute issues or events overnight.        DVT PTX: heparin   Injectable 7500 Unit(s) SubCutaneous every 8 hours    GI PTX: pantoprazole    Tablet 40 milliGRAM(s) Oral before breakfast    ***Tubes/Lines/Drains  ***  Peripheral IV  Lumbar Drain-     [ x]A ten-point review of systems was otherwise negative except as noted above.  [ ]Due to altered mental status/intubation, subjective information was not attained from the patient.  History was obtained, to the extent possible, from review of the chart and collateral sources of information.        Ary name: Manohar Aguayo  Age 19 yrs    Indication for ICU admission:  Spinal drain  Admit Date:  10/2  ICU Date:      10/3  OR Date:      10/3    No Known Allergies    PAST MEDICAL & SURGICAL HISTORY:  Anxiety  S/P ACL repair  x2  History of tonsillectomy  Home Medications:       20 y/o male with recurrent CSF leak on POD #7  (had been repaired at time of initial surgery)  Now s/p primary repair of dura , application of duraseal and a fat plug  A spinal drain was placed for intermittant drainage of CSF    24 HOUR EVENTS:  No acute events. Increased bowel regimen, f/u BM. Continue bedrest and drain -10cc/hr. DVT sono ordered to R/O DVT due to multiple recent surgeries and bedrest. No acute issues or events overnight.        DVT PTX: heparin   Injectable 7500 Unit(s) SubCutaneous every 8 hours    GI PTX: pantoprazole    Tablet 40 milliGRAM(s) Oral before breakfast    ***Tubes/Lines/Drains  ***  Peripheral IV  Lumbar Drain-     [ x]A ten-point review of systems was otherwise negative except as noted above.  [ ]Due to altered mental status/intubation, subjective information was not attained from the patient.  History was obtained, to the extent possible, from review of the chart and collateral sources of information.         Daily     Daily Weight in k.3 (07 Oct 2020 06:00)    CURRENT MEDS:  Neurologic Medications  acetaminophen   Tablet .. 650 milliGRAM(s) Oral every 6 hours  cyclobenzaprine 10 milliGRAM(s) Oral <User Schedule> PRN Muscle Spasm  diazepam    Tablet 5 milliGRAM(s) Oral <User Schedule>  melatonin 5 milliGRAM(s) Oral at bedtime  oxyCODONE    IR 5 milliGRAM(s) Oral every 6 hours PRN Moderate Pain (4 - 6)  oxyCODONE    IR 10 milliGRAM(s) Oral every 6 hours PRN Severe Pain (7 - 10)    Respiratory Medications    Cardiovascular Medications    Gastrointestinal Medications  bisacodyl Suppository 10 milliGRAM(s) Rectal daily  lactulose Syrup 10 Gram(s) Oral every 8 hours  magnesium hydroxide Suspension 30 milliLiter(s) Oral daily PRN Constipation  multivitamin 1 Tablet(s) Oral daily  pantoprazole    Tablet 40 milliGRAM(s) Oral before breakfast  polyethylene glycol 3350 17 Gram(s) Oral daily  senna 2 Tablet(s) Oral at bedtime    Genitourinary Medications    Hematologic/Oncologic Medications  heparin   Injectable 7500 Unit(s) SubCutaneous every 8 hours  influenza   Vaccine 0.5 milliLiter(s) IntraMuscular once    Antimicrobial/Immunologic Medications  ceFAZolin   IVPB 1000 milliGRAM(s) IV Intermittent every 8 hours    Endocrine/Metabolic Medications    Topical/Other Medications  chlorhexidine 4% Liquid 1 Application(s) Topical <User Schedule>      ICU Vital Signs Last 24 Hrs  T(C): 37.1 (07 Oct 2020 07:46), Max: 37.4 (06 Oct 2020 15:40)  T(F): 98.8 (07 Oct 2020 07:46), Max: 99.4 (06 Oct 2020 15:40)  HR: 87 (07 Oct 2020 07:00) (80 - 102)  BP: 130/64 (07 Oct 2020 07:00) (104/50 - 167/79)  BP(mean): 88 (07 Oct 2020 07:00) (72 - 114)  ABP: --  ABP(mean): --  RR: 18 (07 Oct 2020 07:00) (16 - 18)  SpO2: 99% (07 Oct 2020 07:00) (97% - 100%)        I&O's Detail    06 Oct 2020 07:01  -  07 Oct 2020 07:00  --------------------------------------------------------  IN:    IV PiggyBack: 100 mL  Total IN: 100 mL    OUT:    External Ventricular Device (mL): 230 mL    Voided (mL): 3325 mL  Total OUT: 3555 mL    Total NET: -3455 mL        I&O's Summary    06 Oct 2020 07:01  -  07 Oct 2020 07:00  --------------------------------------------------------  IN: 100 mL / OUT: 3555 mL / NET: -3455 mL        CXR:     LABS:  CAPILLARY BLOOD GLUCOSE                              13.5   11.38 )-----------( 282      ( 06 Oct 2020 23:41 )             40.9         10-06    139  |  106  |  21<H>  ----------------------------<  132<H>  4.4   |  21  |  0.9      Calcium, Total Serum: 8.6 mg/dL (10-06-20 @ 23:41)         Coags:     11.70  ----< 1.02    ( 06 Oct 2020 23:41 )     30.1                    PHYSICAL EXAM:    General/Neuro   GCS:   15      Deficits:  alert & oriented x 3, no focal deficits, 5/5 motor strength b/l  Lungs:  clear to auscultation, Normal expansion/effort.   Cardiovascular : S1, S2.  Regular rate and rhythm.    Cardiac Rhythm: Normal Sinus Rhythm  GI: Abdomen soft, Non-tender, Non-distended.    Extremities: Extremities warm, well-perfused.  No  Peripheral edema b/l LE  Derm: Good skin turgor, no skin breakdown.    : No Shirley catheter

## 2020-10-08 LAB
ANION GAP SERPL CALC-SCNC: 11 MMOL/L — SIGNIFICANT CHANGE UP (ref 7–14)
BUN SERPL-MCNC: 22 MG/DL — HIGH (ref 10–20)
CALCIUM SERPL-MCNC: 9.4 MG/DL — SIGNIFICANT CHANGE UP (ref 8.5–10.1)
CHLORIDE SERPL-SCNC: 104 MMOL/L — SIGNIFICANT CHANGE UP (ref 98–110)
CO2 SERPL-SCNC: 24 MMOL/L — SIGNIFICANT CHANGE UP (ref 17–32)
CREAT SERPL-MCNC: 1 MG/DL — SIGNIFICANT CHANGE UP (ref 0.3–1)
GLUCOSE SERPL-MCNC: 108 MG/DL — HIGH (ref 70–99)
HCT VFR BLD CALC: 40.5 % — LOW (ref 42–52)
HGB BLD-MCNC: 13.5 G/DL — LOW (ref 14–18)
MAGNESIUM SERPL-MCNC: 2 MG/DL — SIGNIFICANT CHANGE UP (ref 1.8–2.4)
MCHC RBC-ENTMCNC: 29.8 PG — SIGNIFICANT CHANGE UP (ref 27–31)
MCHC RBC-ENTMCNC: 33.3 G/DL — SIGNIFICANT CHANGE UP (ref 32–37)
MCV RBC AUTO: 89.4 FL — SIGNIFICANT CHANGE UP (ref 80–94)
NRBC # BLD: 0 /100 WBCS — SIGNIFICANT CHANGE UP (ref 0–0)
PHOSPHATE SERPL-MCNC: 4.8 MG/DL — SIGNIFICANT CHANGE UP (ref 2.1–4.9)
PLATELET # BLD AUTO: 313 K/UL — SIGNIFICANT CHANGE UP (ref 130–400)
POTASSIUM SERPL-MCNC: 4.6 MMOL/L — SIGNIFICANT CHANGE UP (ref 3.5–5)
POTASSIUM SERPL-SCNC: 4.6 MMOL/L — SIGNIFICANT CHANGE UP (ref 3.5–5)
RBC # BLD: 4.53 M/UL — LOW (ref 4.7–6.1)
RBC # FLD: 13 % — SIGNIFICANT CHANGE UP (ref 11.5–14.5)
SODIUM SERPL-SCNC: 139 MMOL/L — SIGNIFICANT CHANGE UP (ref 135–146)
WBC # BLD: 13.68 K/UL — HIGH (ref 4.8–10.8)
WBC # FLD AUTO: 13.68 K/UL — HIGH (ref 4.8–10.8)

## 2020-10-08 PROCEDURE — 71045 X-RAY EXAM CHEST 1 VIEW: CPT | Mod: 26

## 2020-10-08 PROCEDURE — 99291 CRITICAL CARE FIRST HOUR: CPT

## 2020-10-08 RX ORDER — MAGNESIUM HYDROXIDE 400 MG/1
30 TABLET, CHEWABLE ORAL DAILY
Refills: 0 | Status: DISCONTINUED | OUTPATIENT
Start: 2020-10-08 | End: 2020-10-12

## 2020-10-08 RX ORDER — MINERAL OIL
30 OIL (ML) MISCELLANEOUS DAILY
Refills: 0 | Status: DISCONTINUED | OUTPATIENT
Start: 2020-10-08 | End: 2020-10-12

## 2020-10-08 RX ADMIN — Medication 5 MILLIGRAM(S): at 00:02

## 2020-10-08 RX ADMIN — Medication 100 MILLIGRAM(S): at 22:20

## 2020-10-08 RX ADMIN — Medication 650 MILLIGRAM(S): at 13:01

## 2020-10-08 RX ADMIN — HEPARIN SODIUM 7500 UNIT(S): 5000 INJECTION INTRAVENOUS; SUBCUTANEOUS at 05:12

## 2020-10-08 RX ADMIN — SENNA PLUS 2 TABLET(S): 8.6 TABLET ORAL at 22:21

## 2020-10-08 RX ADMIN — Medication 5 MILLIGRAM(S): at 12:12

## 2020-10-08 RX ADMIN — Medication 100 MILLIGRAM(S): at 13:00

## 2020-10-08 RX ADMIN — CHLORHEXIDINE GLUCONATE 1 APPLICATION(S): 213 SOLUTION TOPICAL at 05:11

## 2020-10-08 RX ADMIN — Medication 650 MILLIGRAM(S): at 05:11

## 2020-10-08 RX ADMIN — HEPARIN SODIUM 7500 UNIT(S): 5000 INJECTION INTRAVENOUS; SUBCUTANEOUS at 13:01

## 2020-10-08 RX ADMIN — Medication 100 MILLIGRAM(S): at 05:30

## 2020-10-08 RX ADMIN — PANTOPRAZOLE SODIUM 40 MILLIGRAM(S): 20 TABLET, DELAYED RELEASE ORAL at 05:11

## 2020-10-08 RX ADMIN — Medication 650 MILLIGRAM(S): at 23:20

## 2020-10-08 RX ADMIN — LACTULOSE 10 GRAM(S): 10 SOLUTION ORAL at 17:06

## 2020-10-08 RX ADMIN — Medication 1 TABLET(S): at 12:12

## 2020-10-08 RX ADMIN — Medication 650 MILLIGRAM(S): at 18:56

## 2020-10-08 RX ADMIN — Medication 650 MILLIGRAM(S): at 06:00

## 2020-10-08 RX ADMIN — Medication 30 MILLILITER(S): at 16:55

## 2020-10-08 RX ADMIN — Medication 650 MILLIGRAM(S): at 12:10

## 2020-10-08 RX ADMIN — Medication 5 MILLIGRAM(S): at 17:06

## 2020-10-08 RX ADMIN — Medication 5 MILLIGRAM(S): at 23:19

## 2020-10-08 RX ADMIN — HEPARIN SODIUM 7500 UNIT(S): 5000 INJECTION INTRAVENOUS; SUBCUTANEOUS at 22:20

## 2020-10-08 RX ADMIN — Medication 650 MILLIGRAM(S): at 17:05

## 2020-10-08 RX ADMIN — Medication 5 MILLIGRAM(S): at 22:21

## 2020-10-08 RX ADMIN — Medication 650 MILLIGRAM(S): at 00:03

## 2020-10-08 NOTE — PROGRESS NOTE ADULT - SUBJECTIVE AND OBJECTIVE BOX
Ary name: Manohar Aguayo  Age 19 yrs    Indication for ICU admission:  Spinal drain    Admit Date:  10/2  ICU Date:      10/3  OR Date:      10/3    No Known Allergies    PAST MEDICAL & SURGICAL HISTORY:  Anxiety  S/P ACL repair  x2  History of tonsillectomy  Home Medications:       20 y/o male with recurrent CSF leak on POD #7  (had been repaired at time of initial surgery)  Now s/p primary repair of dura , application of duraseal and a fat plug  A spinal drain was placed for intermittant drainage of CSF    24 HOUR EVENTS:  DAY:  -no bm yet, d/c'd oxy, increased lactulose to 10g q6h  -DVT sono neg  -may sit up in bed and dangle feet, may use bedside commode as per NSG    Overnight: No BM. Hemodynamically stable. Draining 10cc/hr from drain.     DVT PTX: heparin   Injectable 7500 Unit(s) SubCutaneous every 8 hours    GI PTX: pantoprazole    Tablet 40 milliGRAM(s) Oral before breakfast    ***Tubes/Lines/Drains  ***  Peripheral IV    [ x]A ten-point review of systems was otherwise negative except as noted above.  [ ]Due to altered mental status/intubation, subjective information was not attained from the patient.  History was obtained, to the extent possible, from review of the chart and collateral sources of information.    Ary name: Manohar Aguayo  Age 19 yrs    Indication for ICU admission:  Spinal drain    Admit Date:  10/2  ICU Date:      10/3  OR Date:      10/3    No Known Allergies    PAST MEDICAL & SURGICAL HISTORY:  Anxiety  S/P ACL repair  x2  History of tonsillectomy  Home Medications:       20 y/o male with recurrent CSF leak on POD #7  (had been repaired at time of initial surgery)  Now s/p primary repair of dura , application of duraseal and a fat plug  A spinal drain was placed for intermittant drainage of CSF    24 HOUR EVENTS:  DAY:  -no bm yet, d/c'd oxy, increased lactulose to 10g q6h  -DVT sono neg  -may sit up in bed and dangle feet, may use bedside commode as per NSG    Overnight: No BM. Hemodynamically stable. Draining 10cc/hr from drain.     DVT PTX: heparin   Injectable 7500 Unit(s) SubCutaneous every 8 hours    GI PTX: pantoprazole    Tablet 40 milliGRAM(s) Oral before breakfast    ***Tubes/Lines/Drains  ***  Peripheral IV    [ x]A ten-point review of systems was otherwise negative except as noted above.  [ ]Due to altered mental status/intubation, subjective information was not attained from the patient.  History was obtained, to the extent possible, from review of the chart and collateral sources of information.     Daily     Daily Weight in k.2 (08 Oct 2020 06:00)    Diet, Regular (10-03-20 @ 12:55)      CURRENT MEDS:  Neurologic Medications  acetaminophen   Tablet .. 650 milliGRAM(s) Oral every 6 hours  cyclobenzaprine 10 milliGRAM(s) Oral <User Schedule> PRN Muscle Spasm  diazepam    Tablet 5 milliGRAM(s) Oral <User Schedule>  melatonin 5 milliGRAM(s) Oral at bedtime    Respiratory Medications    Cardiovascular Medications    Gastrointestinal Medications  bisacodyl Suppository 10 milliGRAM(s) Rectal daily  lactulose Syrup 10 Gram(s) Oral every 6 hours  magnesium hydroxide Suspension 30 milliLiter(s) Oral daily  mineral oil 30 milliLiter(s) Oral daily  multivitamin 1 Tablet(s) Oral daily  pantoprazole    Tablet 40 milliGRAM(s) Oral before breakfast  polyethylene glycol 3350 17 Gram(s) Oral daily  senna 2 Tablet(s) Oral at bedtime    Genitourinary Medications    Hematologic/Oncologic Medications  heparin   Injectable 7500 Unit(s) SubCutaneous every 8 hours  influenza   Vaccine 0.5 milliLiter(s) IntraMuscular once    Antimicrobial/Immunologic Medications  ceFAZolin   IVPB 1000 milliGRAM(s) IV Intermittent every 8 hours    Endocrine/Metabolic Medications    Topical/Other Medications  chlorhexidine 4% Liquid 1 Application(s) Topical <User Schedule>      ICU Vital Signs Last 24 Hrs  T(C): 37.6 (08 Oct 2020 07:46), Max: 37.6 (07 Oct 2020 20:00)  T(F): 99.7 (08 Oct 2020 07:46), Max: 99.7 (07 Oct 2020 20:00)  HR: 89 (08 Oct 2020 10:00) (88 - 110)  BP: 124/72 (08 Oct 2020 10:00) (114/68 - 155/82)  BP(mean): 90 (08 Oct 2020 10:00) (85 - 110)  ABP: --  ABP(mean): --  RR: 16 (08 Oct 2020 10:00) (14 - 20)  SpO2: 98% (08 Oct 2020 10:00) (97% - 100%)      Adult Advanced Hemodynamics Last 24 Hrs  CVP(mm Hg): --  CVP(cm H2O): --  CO: --  CI: --  PA: --  PA(mean): --  PCWP: --  SVR: --  SVRI: --  PVR: --  PVRI: --          I&O's Summary    07 Oct 2020 07:01  -  08 Oct 2020 07:00  --------------------------------------------------------  IN: 650 mL / OUT: 2130 mL / NET: -1480 mL    08 Oct 2020 07:01  -  08 Oct 2020 11:36  --------------------------------------------------------  IN: 0 mL / OUT: 30 mL / NET: -30 mL      I&O's Detail    07 Oct 2020 07:01  -  08 Oct 2020 07:00  --------------------------------------------------------  IN:    IV PiggyBack: 150 mL    Oral Fluid: 500 mL  Total IN: 650 mL    OUT:    External Ventricular Device (mL): 230 mL    Voided (mL): 1900 mL  Total OUT: 2130 mL    Total NET: -1480 mL      08 Oct 2020 07:01  -  08 Oct 2020 11:36  --------------------------------------------------------  IN:  Total IN: 0 mL    OUT:    External Ventricular Device (mL): 30 mL  Total OUT: 30 mL    Total NET: -30 mL          PHYSICAL EXAM:    General/Neuro  RASS:             GCS:     = E   / V   / M      Deficits:                             alert & oriented x 3, no focal deficits  Pupils:    Lungs:      clear to auscultation, Normal expansion/effort.     Cardiovascular : S1, S2.  Regular rate and rhythm.  Peripheral edema   Cardiac Rhythm: Normal Sinus Rhythm    GI: Abdomen soft, Non-tender, Non-distended.    Gastrostomy / Jejunostomy tube in place.  Nasogastric tube in place.  Colostomy / Ileostomy.    Wound:    Extremities: Extremities warm, pink, well-perfused. Pulses:Rt     Lt    Derm: Good skin turgor, no skin breakdown.      :       Shirley catheter in place.      CXR:     LABS:  CAPILLARY BLOOD GLUCOSE                              13.5   13.68 )-----------( 313      ( 08 Oct 2020 00:14 )             40.5       10-08    139  |  104  |  22<H>  ----------------------------<  108<H>  4.6   |  24  |  1.0    Ca    9.4      08 Oct 2020 00:14  Phos  4.8     10-08  Mg     2.0     10-08        PT/INR - ( 06 Oct 2020 23:41 )   PT: 11.70 sec;   INR: 1.02 ratio         PTT - ( 06 Oct 2020 23:41 )  PTT:30.1 sec           Ary name: Manohar Aguayo  Age 19 yrs    Indication for ICU admission:  Spinal drain    Admit Date:  10/2  ICU Date:      10/3  OR Date:      10/3    No Known Allergies    PAST MEDICAL & SURGICAL HISTORY:  Anxiety  S/P ACL repair  x2  History of tonsillectomy  Home Medications:       20 y/o male with recurrent CSF leak on POD #7  (had been repaired at time of initial surgery)  Now s/p primary repair of dura , application of duraseal and a fat plug  A spinal drain was placed for intermittant drainage of CSF    24 HOUR EVENTS:  DAY:  -no bm yet, d/c'd oxy, increased lactulose to 10g q6h  -DVT sono neg  -may sit up in bed and dangle feet, may use bedside commode as per NSG    Overnight: No BM. Hemodynamically stable. Draining 10cc/hr from drain.     DVT PTX: heparin   Injectable 7500 Unit(s) SubCutaneous every 8 hours    GI PTX: pantoprazole    Tablet 40 milliGRAM(s) Oral before breakfast    ***Tubes/Lines/Drains  ***  Peripheral IV    [ x]A ten-point review of systems was otherwise negative except as noted above.  [ ]Due to altered mental status/intubation, subjective information was not attained from the patient.  History was obtained, to the extent possible, from review of the chart and collateral sources of information.     Daily     Daily Weight in k.2 (08 Oct 2020 06:00)    Diet, Regular (10-03-20 @ 12:55)      CURRENT MEDS:  Neurologic Medications  acetaminophen   Tablet .. 650 milliGRAM(s) Oral every 6 hours  cyclobenzaprine 10 milliGRAM(s) Oral <User Schedule> PRN Muscle Spasm  diazepam    Tablet 5 milliGRAM(s) Oral <User Schedule>  melatonin 5 milliGRAM(s) Oral at bedtime    Respiratory Medications    Cardiovascular Medications    Gastrointestinal Medications  bisacodyl Suppository 10 milliGRAM(s) Rectal daily  lactulose Syrup 10 Gram(s) Oral every 6 hours  magnesium hydroxide Suspension 30 milliLiter(s) Oral daily  mineral oil 30 milliLiter(s) Oral daily  multivitamin 1 Tablet(s) Oral daily  pantoprazole    Tablet 40 milliGRAM(s) Oral before breakfast  polyethylene glycol 3350 17 Gram(s) Oral daily  senna 2 Tablet(s) Oral at bedtime    Genitourinary Medications    Hematologic/Oncologic Medications  heparin   Injectable 7500 Unit(s) SubCutaneous every 8 hours  influenza   Vaccine 0.5 milliLiter(s) IntraMuscular once    Antimicrobial/Immunologic Medications  ceFAZolin   IVPB 1000 milliGRAM(s) IV Intermittent every 8 hours    Endocrine/Metabolic Medications    Topical/Other Medications  chlorhexidine 4% Liquid 1 Application(s) Topical <User Schedule>      ICU Vital Signs Last 24 Hrs  T(C): 37.6 (08 Oct 2020 07:46), Max: 37.6 (07 Oct 2020 20:00)  T(F): 99.7 (08 Oct 2020 07:46), Max: 99.7 (07 Oct 2020 20:00)  HR: 89 (08 Oct 2020 10:00) (88 - 110)  BP: 124/72 (08 Oct 2020 10:00) (114/68 - 155/82)  BP(mean): 90 (08 Oct 2020 10:00) (85 - 110)  RR: 16 (08 Oct 2020 10:00) (14 - 20)  SpO2: 98% (08 Oct 2020 10:00) (97% - 100%)      Adult Advanced Hemodynamics Last 24 Hrs      I&O's Summary    07 Oct 2020 07:  -  08 Oct 2020 07:00  --------------------------------------------------------  IN: 650 mL / OUT: 2130 mL / NET: -1480 mL    08 Oct 2020 07:  -  08 Oct 2020 11:36  --------------------------------------------------------  IN: 0 mL / OUT: 30 mL / NET: -30 mL      I&O's Detail    07 Oct 2020 07:01  -  08 Oct 2020 07:00  --------------------------------------------------------  IN:    IV PiggyBack: 150 mL    Oral Fluid: 500 mL  Total IN: 650 mL    OUT:    External Ventricular Device (mL): 230 mL    Voided (mL): 1900 mL  Total OUT: 2130 mL    Total NET: -1480 mL      08 Oct 2020 07:01  -  08 Oct 2020 11:36  --------------------------------------------------------  IN:  Total IN: 0 mL    OUT:    External Ventricular Device (mL): 30 mL  Total OUT: 30 mL    Total NET: -30 mL        PHYSICAL EXAM:  General/Neuro     Alert & oriented x 3, no focal deficits. No acute distress     Lungs: Clear to auscultation, Normal expansion/effort.     Cardiovascular : S1, S2.  Regular rate and rhythm.  Peripheral edema   Cardiac Rhythm: Normal Sinus Rhythm    GI: Abdomen soft, Non-tender, Non-distended.      Wound: Lumbar drain in place. No erythema or edema surrounding drain     Extremities: Extremities warm, pink, well-perfused. 5/5 strength in bilateral upper and lower extremities     Derm: Good skin turgor, no skin breakdown.      : Voiding. No bowel movement as of 10/8/2020 0900      LABS:  CAPILLARY BLOOD GLUCOSE                              13.5   13.68 )-----------( 313      ( 08 Oct 2020 00:14 )             40.5       10-08    139  |  104  |  22<H>  ----------------------------<  108<H>  4.6   |  24  |  1.0    Ca    9.4      08 Oct 2020 00:14  Phos  4.8     10-08  Mg     2.0     10-08        PT/INR - ( 06 Oct 2020 23:41 )   PT: 11.70 sec;   INR: 1.02 ratio         PTT - ( 06 Oct 2020 23:41 )  PTT:30.1 sec

## 2020-10-08 NOTE — CHART NOTE - NSCHARTNOTEFT_GEN_A_CORE
Received call pts lumbar drain had tinge of blood after activity. Pt seen and examined at bedside and nurse stated drain dressing was leaking during ambulation. Drain was inspected and was not currently leaking and had no blood tinge/ discharge. Enforcement bandage was applied and drain was checked. CSF drained upon opening  and was not bloody or abnormal appearing. Pt was then slowly adjusted to lay down, drain was checked again and confirmed proper drainage of csf.   - Drain to remain 10cc/hr - currently open nurse notified   - Pt to walk with PT tomorrow, may get OOB to commode currently    - please call if pt reports HA or other new neuro sx  - please ambulate / adjust pt slowly and call if any bloody drainage / failure to drain when open

## 2020-10-08 NOTE — PROGRESS NOTE ADULT - SUBJECTIVE AND OBJECTIVE BOX
POD# 5    S/P Wound Exploration, Repair of CSF Leak, Placement of Lumbar Drain    Pt seen and examined at bedside. Pt c/o incisional pain. Denies lower extremity radiculopathy, parasthesias, weakness. Denies HA    Vital Signs Last 24 Hrs  T(C): 37.6 (08 Oct 2020 07:46), Max: 37.6 (07 Oct 2020 20:00)  T(F): 99.7 (08 Oct 2020 07:46), Max: 99.7 (07 Oct 2020 20:00)  HR: 89 (08 Oct 2020 10:00) (88 - 110)  BP: 124/72 (08 Oct 2020 10:00) (114/68 - 155/82)  BP(mean): 90 (08 Oct 2020 10:00) (85 - 110)  RR: 16 (08 Oct 2020 10:00) (14 - 20)  SpO2: 98% (08 Oct 2020 10:00) (97% - 100%)    I&O's Detail    07 Oct 2020 07:01  -  08 Oct 2020 07:00  --------------------------------------------------------  IN:    IV PiggyBack: 150 mL    Oral Fluid: 500 mL  Total IN: 650 mL    OUT:    External Ventricular Device (mL): 230 mL    Voided (mL): 1900 mL  Total OUT: 2130 mL    Total NET: -1480 mL      08 Oct 2020 07:01  -  08 Oct 2020 12:41  --------------------------------------------------------  IN:  Total IN: 0 mL    OUT:    External Ventricular Device (mL): 30 mL  Total OUT: 30 mL    Total NET: -30 mL        I&O's Summary    07 Oct 2020 07:01  -  08 Oct 2020 07:00  --------------------------------------------------------  IN: 650 mL / OUT: 2130 mL / NET: -1480 mL    08 Oct 2020 07:01  -  08 Oct 2020 12:41  --------------------------------------------------------  IN: 0 mL / OUT: 30 mL / NET: -30 mL        REVIEW OF SYSTEMS    [X] A ten-point review of systems was otherwise negative except as noted.  [ ] Due to altered mental status/intubation, subjective information were not able to be obtained from the patient. History was obtained, to the extent possible, from review of the chart and collateral sources of information.      PHYSICAL EXAM:  Neurological:  Strength 5/5 B/L LE  Sensation intact to light touch  Dressing mild bloody d/c    LABS:                        13.5   13.68 )-----------( 313      ( 08 Oct 2020 00:14 )             40.5     10-08    139  |  104  |  22<H>  ----------------------------<  108<H>  4.6   |  24  |  1.0    Ca    9.4      08 Oct 2020 00:14  Phos  4.8     10-08  Mg     2.0     10-08      PT/INR - ( 06 Oct 2020 23:41 )   PT: 11.70 sec;   INR: 1.02 ratio    PTT - ( 06 Oct 2020 23:41 )  PTT:30.1 sec    Allergies    No Known Allergies    Intolerances      MEDICATIONS:  Antibiotics:  ceFAZolin   IVPB 1000 milliGRAM(s) IV Intermittent every 8 hours    Neuro:  acetaminophen   Tablet .. 650 milliGRAM(s) Oral every 6 hours  cyclobenzaprine 10 milliGRAM(s) Oral <User Schedule> PRN  diazepam    Tablet 5 milliGRAM(s) Oral <User Schedule>  melatonin 5 milliGRAM(s) Oral at bedtime      IVF:  multivitamin 1 Tablet(s) Oral daily      CULTURES:      RADIOLOGY & ADDITIONAL TESTS:      ASSESSMENT:  19y Male s/p    BACK PAIN, HEADACHE    ^BACK PAIN, HEADACHE    Family history of diabetes mellitus (DM)    Handoff    MEWS Score    Anxiety    Anxiety    Depression    No pertinent past medical history    Postoperative CSF leak    Postoperative CSF leak    Back pain    Exploration, wound, lumbar region, with repair of dural defect if indicated, for CSF leak    S/P ACL repair    History of tonsillectomy    BACK SURGERY COMPLICATIONS    3    Headache    [email or web address]        PLAN:  May be Upright OOB today  Continue current mgmt  Monitor for HA's

## 2020-10-08 NOTE — PROGRESS NOTE ADULT - ASSESSMENT
Assesssment and Plan    Neuro-  Pain control Tylenol standing,  D/c'd Oxy  Muscle spasm- Valium 5mg prn and Robaxin  Sensory and motor function intact  Lumbar drain to be opened q1hr and drain -10ml/hour   - plan to keep drain in x 3 more days  - continue q1 neurochecks  Management of drain as per NS    Pulm-  No chronic diagnosis  Currently on RA, saturating 93-95%  Encourage IS use    Cardiology-  No chronic diagnosis  Cardiac monitoring    GI-  Contiue regular diet as tolerated  Protonix PPX  Avoid constipation. Senna, miralax, increased lactulose to 10g q6h  -No bowel movement as of 0900 on 10/6  bisacodyl 5 milliGRAM(s) Oral at bedtime  polyethylene glycol 3350 17 Gram(s) Oral daily  senna 2 Tablet(s) Oral at bedtime     Renal  MAGNUS 2/2 acetozolamide resolved with hydration  BUN/Cr 22/1.0   IVL  Voiding adequately   Na 13p/ K 4.6/ Mg 2.0/ Phos 4.8     ID  Mild leukocytosis stable   Ancef until drain is discontinued  No signs of infection  WBC 13.68     Heme  H&H at baseline 13.5  this is patient's baseline  DVT PPX: herpain subq   DVT sono 10/7: NEG     MSK-  MUÑOZ, motor strength 5/5 of BUE and 5/5 BLE  Activity: -may sit up in bed and dangle feet, may use bedside commode as per NSG    Endo  No chronic diagnosis  FS ac and qhs with coverage as needed    Dispo: SICU     Assesssment and Plan  18 y/o male with recurrent CSF leak on POD #13 (had been repaired at time of initial surgery), now POD#5  s/p primary repair of dura , application of duraseal and a fat plug with intermittent drainage of CSF  Neuro-  Pain control Tylenol standing,  D/c'd Oxy  Muscle spasm- Valium 5mg prn and Robaxin  Sensory and motor function intact  Lumbar drain to be opened q1hr and drain -10ml/hour   - plan to keep drain in x 3 more days  - continue q1 neurochecks  Management of drain as per NS    Pulm-  No chronic diagnosis  Currently on RA, saturating 93-95%  Encourage IS use    Cardiology-  No chronic diagnosis  Cardiac monitoring    GI-  Contiue regular diet as tolerated  Protonix PPX  Avoid constipation. Senna, miralax, increased lactulose to 10g q6h, mineral oil. Consider enema tomorrow if no bowel movement   -No bowel movement as of 0900 on 10/8  bisacodyl 5 milliGRAM(s) Oral at bedtime  polyethylene glycol 3350 17 Gram(s) Oral daily  senna 2 Tablet(s) Oral at bedtime     Renal  MAGNUS 2/2 acetozolamide resolved with hydration  BUN/Cr 22/1.0   IVL  Voiding adequately   Na 13p/ K 4.6/ Mg 2.0/ Phos 4.8     ID  Mild leukocytosis stable   Ancef until drain is discontinued  No signs of infection  WBC 13.68     Heme  H&H at baseline 13.5  DVT PPX: herpain subq   DVT sono 10/7: NEG     MSK-  MUÑOZ, motor strength 5/5 of BUE and 5/5 BLE  Activity: -may sit up in bed and dangle feet, may use bedside commode as per NSG. Requesting additional recommendations regarding activity from neurosurg in an attempt to increase bowel motility     Endo  No chronic diagnosis  FS ac and qhs with coverage as needed    Dispo: SICU with neurosurg management      Gave aware

## 2020-10-08 NOTE — PROVIDER CONTACT NOTE (OTHER) - SITUATION
pt states neurosurgeons visited him earlier and stated pt can get oob and work with PT and may use bedside commode; current activity order states bedrest, may dangle feet

## 2020-10-09 DIAGNOSIS — F41.9 ANXIETY DISORDER, UNSPECIFIED: ICD-10-CM

## 2020-10-09 DIAGNOSIS — M47.816 SPONDYLOSIS WITHOUT MYELOPATHY OR RADICULOPATHY, LUMBAR REGION: ICD-10-CM

## 2020-10-09 DIAGNOSIS — M51.37 OTHER INTERVERTEBRAL DISC DEGENERATION, LUMBOSACRAL REGION: ICD-10-CM

## 2020-10-09 DIAGNOSIS — M43.17 SPONDYLOLISTHESIS, LUMBOSACRAL REGION: ICD-10-CM

## 2020-10-09 DIAGNOSIS — E66.09 OTHER OBESITY DUE TO EXCESS CALORIES: ICD-10-CM

## 2020-10-09 DIAGNOSIS — M48.07 SPINAL STENOSIS, LUMBOSACRAL REGION: ICD-10-CM

## 2020-10-09 LAB
ANION GAP SERPL CALC-SCNC: 11 MMOL/L — SIGNIFICANT CHANGE UP (ref 7–14)
APTT BLD: 31.9 SEC — SIGNIFICANT CHANGE UP (ref 27–39.2)
BUN SERPL-MCNC: 23 MG/DL — HIGH (ref 10–20)
CALCIUM SERPL-MCNC: 9.4 MG/DL — SIGNIFICANT CHANGE UP (ref 8.5–10.1)
CHLORIDE SERPL-SCNC: 104 MMOL/L — SIGNIFICANT CHANGE UP (ref 98–110)
CO2 SERPL-SCNC: 23 MMOL/L — SIGNIFICANT CHANGE UP (ref 17–32)
CREAT SERPL-MCNC: 0.8 MG/DL — SIGNIFICANT CHANGE UP (ref 0.3–1)
GLUCOSE SERPL-MCNC: 112 MG/DL — HIGH (ref 70–99)
HCT VFR BLD CALC: 41 % — LOW (ref 42–52)
HGB BLD-MCNC: 13.7 G/DL — LOW (ref 14–18)
INR BLD: 1.19 RATIO — SIGNIFICANT CHANGE UP (ref 0.65–1.3)
MAGNESIUM SERPL-MCNC: 2 MG/DL — SIGNIFICANT CHANGE UP (ref 1.8–2.4)
MCHC RBC-ENTMCNC: 29.3 PG — SIGNIFICANT CHANGE UP (ref 27–31)
MCHC RBC-ENTMCNC: 33.4 G/DL — SIGNIFICANT CHANGE UP (ref 32–37)
MCV RBC AUTO: 87.8 FL — SIGNIFICANT CHANGE UP (ref 80–94)
NRBC # BLD: 0 /100 WBCS — SIGNIFICANT CHANGE UP (ref 0–0)
PHOSPHATE SERPL-MCNC: 5.3 MG/DL — HIGH (ref 2.1–4.9)
PLATELET # BLD AUTO: 319 K/UL — SIGNIFICANT CHANGE UP (ref 130–400)
POTASSIUM SERPL-MCNC: 4.7 MMOL/L — SIGNIFICANT CHANGE UP (ref 3.5–5)
POTASSIUM SERPL-SCNC: 4.7 MMOL/L — SIGNIFICANT CHANGE UP (ref 3.5–5)
PROTHROM AB SERPL-ACNC: 13.7 SEC — HIGH (ref 9.95–12.87)
RBC # BLD: 4.67 M/UL — LOW (ref 4.7–6.1)
RBC # FLD: 13 % — SIGNIFICANT CHANGE UP (ref 11.5–14.5)
SODIUM SERPL-SCNC: 138 MMOL/L — SIGNIFICANT CHANGE UP (ref 135–146)
WBC # BLD: 15.8 K/UL — HIGH (ref 4.8–10.8)
WBC # FLD AUTO: 15.8 K/UL — HIGH (ref 4.8–10.8)

## 2020-10-09 PROCEDURE — 71045 X-RAY EXAM CHEST 1 VIEW: CPT | Mod: 26

## 2020-10-09 PROCEDURE — 99291 CRITICAL CARE FIRST HOUR: CPT

## 2020-10-09 RX ADMIN — HEPARIN SODIUM 7500 UNIT(S): 5000 INJECTION INTRAVENOUS; SUBCUTANEOUS at 21:59

## 2020-10-09 RX ADMIN — Medication 650 MILLIGRAM(S): at 05:24

## 2020-10-09 RX ADMIN — Medication 1 TABLET(S): at 13:34

## 2020-10-09 RX ADMIN — CHLORHEXIDINE GLUCONATE 1 APPLICATION(S): 213 SOLUTION TOPICAL at 05:23

## 2020-10-09 RX ADMIN — Medication 650 MILLIGRAM(S): at 06:24

## 2020-10-09 RX ADMIN — LACTULOSE 10 GRAM(S): 10 SOLUTION ORAL at 22:43

## 2020-10-09 RX ADMIN — HEPARIN SODIUM 7500 UNIT(S): 5000 INJECTION INTRAVENOUS; SUBCUTANEOUS at 05:23

## 2020-10-09 RX ADMIN — HEPARIN SODIUM 7500 UNIT(S): 5000 INJECTION INTRAVENOUS; SUBCUTANEOUS at 13:41

## 2020-10-09 RX ADMIN — Medication 100 MILLIGRAM(S): at 21:59

## 2020-10-09 RX ADMIN — Medication 5 MILLIGRAM(S): at 23:02

## 2020-10-09 RX ADMIN — Medication 5 MILLIGRAM(S): at 13:34

## 2020-10-09 RX ADMIN — LACTULOSE 10 GRAM(S): 10 SOLUTION ORAL at 05:56

## 2020-10-09 RX ADMIN — LACTULOSE 10 GRAM(S): 10 SOLUTION ORAL at 02:41

## 2020-10-09 RX ADMIN — Medication 100 MILLIGRAM(S): at 05:24

## 2020-10-09 RX ADMIN — Medication 100 MILLIGRAM(S): at 13:42

## 2020-10-09 RX ADMIN — Medication 5 MILLIGRAM(S): at 22:44

## 2020-10-09 RX ADMIN — PANTOPRAZOLE SODIUM 40 MILLIGRAM(S): 20 TABLET, DELAYED RELEASE ORAL at 10:11

## 2020-10-09 RX ADMIN — SENNA PLUS 2 TABLET(S): 8.6 TABLET ORAL at 22:00

## 2020-10-09 RX ADMIN — Medication 650 MILLIGRAM(S): at 00:00

## 2020-10-09 RX ADMIN — Medication 5 MILLIGRAM(S): at 18:15

## 2020-10-09 NOTE — PHYSICAL THERAPY INITIAL EVALUATION ADULT - TRANSFER TRAINING, PT EVAL
Pt will transfer from bed to chair using RW under supervision to decrease risk of falling by discharge

## 2020-10-09 NOTE — PROGRESS NOTE ADULT - SUBJECTIVE AND OBJECTIVE BOX
Ary name: Manohar Aguayo  Age 19 yrs    Indication for ICU admission:  Spinal drain    Admit Date:  10/2  ICU Date:      10/3  OR Date:      10/3    No Known Allergies    PAST MEDICAL & SURGICAL HISTORY:  Anxiety  S/P ACL repair  x2  History of tonsillectomy  Home Medications:       20 y/o male with recurrent CSF leak on POD #7  (had been repaired at time of initial surgery)  Now s/p primary repair of dura , application of duraseal and a fat plug  A spinal drain was placed for intermittant drainage of CSF    24 HOUR EVENTS:  DAY:  -no bm yet  -added mineral oil to bowel regimen   -may be OOB and make few steps to the commode    O/N: slight serosanguinous drainage on dressing, changed, NSx aware    DVT PTX: heparin   Injectable 7500 Unit(s) SubCutaneous every 8 hours    GI PTX: pantoprazole    Tablet 40 milliGRAM(s) Oral before breakfast    ***Tubes/Lines/Drains  ***  Peripheral IV    [ x]A ten-point review of systems was otherwise negative except as noted above.  [ ]Due to altered mental status/intubation, subjective information was not attained from the patient.  History was obtained, to the extent possible, from review of the chart and collateral sources of information.       Daily     Daily Weight in k.2 (08 Oct 2020 06:00)    Diet, Regular (10-03-20 @ 12:55)      CURRENT MEDS:  Neurologic Medications  acetaminophen   Tablet .. 650 milliGRAM(s) Oral every 6 hours  cyclobenzaprine 10 milliGRAM(s) Oral <User Schedule> PRN Muscle Spasm  diazepam    Tablet 5 milliGRAM(s) Oral <User Schedule>  melatonin 5 milliGRAM(s) Oral at bedtime    Respiratory Medications    Cardiovascular Medications    Gastrointestinal Medications  bisacodyl Suppository 10 milliGRAM(s) Rectal daily  lactulose Syrup 10 Gram(s) Oral every 6 hours  magnesium hydroxide Suspension 30 milliLiter(s) Oral daily  mineral oil 30 milliLiter(s) Oral daily  multivitamin 1 Tablet(s) Oral daily  pantoprazole    Tablet 40 milliGRAM(s) Oral before breakfast  polyethylene glycol 3350 17 Gram(s) Oral daily  senna 2 Tablet(s) Oral at bedtime    Genitourinary Medications    Hematologic/Oncologic Medications  heparin   Injectable 7500 Unit(s) SubCutaneous every 8 hours  influenza   Vaccine 0.5 milliLiter(s) IntraMuscular once    Antimicrobial/Immunologic Medications  ceFAZolin   IVPB 1000 milliGRAM(s) IV Intermittent every 8 hours    Endocrine/Metabolic Medications    Topical/Other Medications  chlorhexidine 4% Liquid 1 Application(s) Topical <User Schedule>      ICU Vital Signs Last 24 Hrs  T(C): 36.8 (08 Oct 2020 23:00), Max: 37.6 (08 Oct 2020 07:46)  T(F): 98.2 (08 Oct 2020 23:00), Max: 99.7 (08 Oct 2020 07:46)  HR: 91 (09 Oct 2020 01:) (88 - 125)  BP: 113/73 (09 Oct 2020 01:00) (111/57 - 153/82)  BP(mean): 86 (09 Oct 2020 01:) (77 - 114)  ABP: --  ABP(mean): --  RR: 17 (09 Oct 2020 01:00) (16 - 20)  SpO2: 98% (09 Oct 2020 01:) (97% - 100%)      Adult Advanced Hemodynamics Last 24 Hrs  CVP(mm Hg): --  CVP(cm H2O): --  CO: --  CI: --  PA: --  PA(mean): --  PCWP: --  SVR: --  SVRI: --  PVR: --  PVRI: --          I&O's Summary    07 Oct 2020 07:01  -  08 Oct 2020 07:00  --------------------------------------------------------  IN: 650 mL / OUT: 2130 mL / NET: -1480 mL    08 Oct 2020 07:01  -  09 Oct 2020 01:06  --------------------------------------------------------  IN: 0 mL / OUT: 820 mL / NET: -820 mL      I&O's Detail    07 Oct 2020 07:01  -  08 Oct 2020 07:00  --------------------------------------------------------  IN:    IV PiggyBack: 150 mL    Oral Fluid: 500 mL  Total IN: 650 mL    OUT:    External Ventricular Device (mL): 230 mL    Voided (mL): 1900 mL  Total OUT: 2130 mL    Total NET: -1480 mL      08 Oct 2020 07:01  -  09 Oct 2020 01:06  --------------------------------------------------------  IN:  Total IN: 0 mL    OUT:    External Ventricular Device (mL): 120 mL    Voided (mL): 700 mL  Total OUT: 820 mL    Total NET: -820 mL          PHYSICAL EXAM:    General/Neuro      Alert & oriented x 3, no focal deficits      Lungs:      clear to auscultation, Normal expansion/effort.     Cardiovascular : S1, S2.  Regular rate and rhythm.  Peripheral edema   Cardiac Rhythm: Normal Sinus Rhythm    GI: Abdomen soft, Non-tender, Non-distended.     Wound: Drain site on lumbar spine shows mild serosanguinous discoloration of dressing, upon takedown, slight blood noted to ooze from drain site upon straining, no fluctuance/tenderness/erythema noted. Dressing replaced.     Extremities: Extremities warm, pink, well-perfused. Pulses palpable in bilateral upper and lower extremities.     Derm: Good skin turgor, no skin breakdown.      :      Shirley out, voiding.     MSK: Patient able to walk few steps with assistance.       CXR:       LABS:  CAPILLARY BLOOD GLUCOSE                              13.7   15.80 )-----------( 319      ( 09 Oct 2020 00:10 )             41.0       10-08    139  |  104  |  22<H>  ----------------------------<  108<H>  4.6   |  24  |  1.0    Ca    9.4      08 Oct 2020 00:14  Phos  4.8     10-08  Mg     2.0     10-08                   Patient name: Manohar Aguayo  Age 19 yrs    Indication for ICU admission:  Spinal drain    Admit Date:  10/2  ICU Date:      10/3  OR Date:      10/3    No Known Allergies    PAST MEDICAL & SURGICAL HISTORY:  Anxiety  S/P ACL repair  x2  History of tonsillectomy  Home Medications:       18 y/o male with recurrent CSF leak on POD #7  (had been repaired at time of initial surgery)  Now s/p primary repair of dura , application of duraseal and a fat plug  A spinal drain was placed for intermittant drainage of CSF    24 HOUR EVENTS:  DAY:  -no bm yet  -added mineral oil to bowel regimen   -may be OOB and make few steps to the commode    O/N: slight serosanguinous drainage on dressing, changed, NSx aware    DVT PTX: heparin   Injectable 7500 Unit(s) SubCutaneous every 8 hours    GI PTX: pantoprazole    Tablet 40 milliGRAM(s) Oral before breakfast    ***Tubes/Lines/Drains  ***  Peripheral IV    [ x]A ten-point review of systems was otherwise negative except as noted above.  [ ]Due to altered mental status/intubation, subjective information was not attained from the patient.  History was obtained, to the extent possible, from review of the chart and collateral sources of information.       Daily     Daily Weight in k.2 (08 Oct 2020 06:00)    Diet, Regular (10-03-20 @ 12:55)      CURRENT MEDS:  Neurologic Medications  acetaminophen   Tablet .. 650 milliGRAM(s) Oral every 6 hours  cyclobenzaprine 10 milliGRAM(s) Oral <User Schedule> PRN Muscle Spasm  diazepam    Tablet 5 milliGRAM(s) Oral <User Schedule>  melatonin 5 milliGRAM(s) Oral at bedtime    Respiratory Medications    Cardiovascular Medications    Gastrointestinal Medications  bisacodyl Suppository 10 milliGRAM(s) Rectal daily  lactulose Syrup 10 Gram(s) Oral every 6 hours  magnesium hydroxide Suspension 30 milliLiter(s) Oral daily  mineral oil 30 milliLiter(s) Oral daily  multivitamin 1 Tablet(s) Oral daily  pantoprazole    Tablet 40 milliGRAM(s) Oral before breakfast  polyethylene glycol 3350 17 Gram(s) Oral daily  senna 2 Tablet(s) Oral at bedtime    Genitourinary Medications    Hematologic/Oncologic Medications  heparin   Injectable 7500 Unit(s) SubCutaneous every 8 hours  influenza   Vaccine 0.5 milliLiter(s) IntraMuscular once    Antimicrobial/Immunologic Medications  ceFAZolin   IVPB 1000 milliGRAM(s) IV Intermittent every 8 hours    Endocrine/Metabolic Medications    Topical/Other Medications  chlorhexidine 4% Liquid 1 Application(s) Topical <User Schedule>      ICU Vital Signs Last 24 Hrs  T(C): 36.8 (08 Oct 2020 23:00), Max: 37.6 (08 Oct 2020 07:46)  T(F): 98.2 (08 Oct 2020 23:00), Max: 99.7 (08 Oct 2020 07:46)  HR: 91 (09 Oct 2020 01:) (88 - 125)  BP: 113/73 (09 Oct 2020 01:) (111/57 - 153/82)  BP(mean): 86 (09 Oct 2020 01:) (77 - 114)  ABP: --  ABP(mean): --  RR: 17 (09 Oct 2020 01:) (16 - 20)  SpO2: 98% (09 Oct 2020 01:) (97% - 100%)      Adult Advanced Hemodynamics Last 24 Hrs  CVP(mm Hg): --  CVP(cm H2O): --  CO: --  CI: --  PA: --  PA(mean): --  PCWP: --  SVR: --  SVRI: --  PVR: --  PVRI: --          I&O's Summary    07 Oct 2020 07:01  -  08 Oct 2020 07:00  --------------------------------------------------------  IN: 650 mL / OUT: 2130 mL / NET: -1480 mL    08 Oct 2020 07:01  -  09 Oct 2020 01:06  --------------------------------------------------------  IN: 0 mL / OUT: 820 mL / NET: -820 mL      I&O's Detail    07 Oct 2020 07:01  -  08 Oct 2020 07:00  --------------------------------------------------------  IN:    IV PiggyBack: 150 mL    Oral Fluid: 500 mL  Total IN: 650 mL    OUT:    External Ventricular Device (mL): 230 mL    Voided (mL): 1900 mL  Total OUT: 2130 mL    Total NET: -1480 mL      08 Oct 2020 07:01  -  09 Oct 2020 01:06  --------------------------------------------------------  IN:  Total IN: 0 mL    OUT:    External Ventricular Device (mL): 120 mL    Voided (mL): 700 mL  Total OUT: 820 mL    Total NET: -820 mL          PHYSICAL EXAM:    General/Neuro      Alert & oriented x 3, no focal deficits      Lungs:      clear to auscultation, Normal expansion/effort.     Cardiovascular : S1, S2.  Regular rate and rhythm.  Peripheral edema   Cardiac Rhythm: Normal Sinus Rhythm    GI: Abdomen soft, Non-tender, Non-distended.     Wound: Drain site on lumbar spine shows mild serosanguinous discoloration of dressing, upon takedown, slight blood noted to ooze from drain site upon straining, no fluctuance/tenderness/erythema noted. Dressing replaced.     Extremities: Extremities warm, pink, well-perfused. Pulses palpable in bilateral upper and lower extremities.     Derm: Good skin turgor, no skin breakdown.      :      Shirley out, voiding.     MSK: Patient able to walk few steps with assistance.       CXR:       LABS:  CAPILLARY BLOOD GLUCOSE                              13.7   15.80 )-----------( 319      ( 09 Oct 2020 00:10 )             41.0       10-08    139  |  104  |  22<H>  ----------------------------<  108<H>  4.6   |  24  |  1.0    Ca    9.4      08 Oct 2020 00:14  Phos  4.8     10-08  Mg     2.0     10-08                   Patient name: Manohar Aguayo  Age 19 yrs    Indication for ICU admission:  Spinal drain    Admit Date:  10/2  ICU Date:      10/3  OR Date:      10/3    No Known Allergies    PAST MEDICAL & SURGICAL HISTORY:  Anxiety  S/P ACL repair  x2  History of tonsillectomy  Home Medications:       20 y/o male with recurrent CSF leak on POD #7  (had been repaired at time of initial surgery)  Now s/p primary repair of dura , application of duraseal and a fat plug  A spinal drain was placed for intermittant drainage of CSF    24 HOUR EVENTS:  DAY:  -no bm yet  -added mineral oil to bowel regimen   -may be OOB and make few steps to the commode    O/N: slight serosanguinous drainage on dressing, changed, NSx aware    DVT PTX: heparin   Injectable 7500 Unit(s) SubCutaneous every 8 hours    GI PTX: pantoprazole    Tablet 40 milliGRAM(s) Oral before breakfast    ***Tubes/Lines/Drains  ***  Peripheral IV    [ x]A ten-point review of systems was otherwise negative except as noted above.  [ ]Due to altered mental status/intubation, subjective information was not attained from the patient.  History was obtained, to the extent possible, from review of the chart and collateral sources of information.       Daily     Daily Weight in k.2 (08 Oct 2020 06:00)    Diet, Regular (10-03-20 @ 12:55)      CURRENT MEDS:  Neurologic Medications  acetaminophen   Tablet .. 650 milliGRAM(s) Oral every 6 hours  cyclobenzaprine 10 milliGRAM(s) Oral <User Schedule> PRN Muscle Spasm  diazepam    Tablet 5 milliGRAM(s) Oral <User Schedule>  melatonin 5 milliGRAM(s) Oral at bedtime    Respiratory Medications    Cardiovascular Medications    Gastrointestinal Medications  bisacodyl Suppository 10 milliGRAM(s) Rectal daily  lactulose Syrup 10 Gram(s) Oral every 6 hours  magnesium hydroxide Suspension 30 milliLiter(s) Oral daily  mineral oil 30 milliLiter(s) Oral daily  multivitamin 1 Tablet(s) Oral daily  pantoprazole    Tablet 40 milliGRAM(s) Oral before breakfast  polyethylene glycol 3350 17 Gram(s) Oral daily  senna 2 Tablet(s) Oral at bedtime    Genitourinary Medications    Hematologic/Oncologic Medications  heparin   Injectable 7500 Unit(s) SubCutaneous every 8 hours  influenza   Vaccine 0.5 milliLiter(s) IntraMuscular once    Antimicrobial/Immunologic Medications  ceFAZolin   IVPB 1000 milliGRAM(s) IV Intermittent every 8 hours    Endocrine/Metabolic Medications    Topical/Other Medications  chlorhexidine 4% Liquid 1 Application(s) Topical <User Schedule>      ICU Vital Signs Last 24 Hrs  T(C): 36.8 (08 Oct 2020 23:00), Max: 37.6 (08 Oct 2020 07:46)  T(F): 98.2 (08 Oct 2020 23:00), Max: 99.7 (08 Oct 2020 07:46)  HR: 91 (09 Oct 2020 01:) (88 - 125)  BP: 113/73 (09 Oct 2020 01:) (111/57 - 153/82)  BP(mean): 86 (09 Oct 2020 01:) (77 - 114)  RR: 17 (09 Oct 2020 01:) (16 - 20)  SpO2: 98% (09 Oct 2020 01:) (97% - 100%)      I&O's Summary    07 Oct 2020 07:  -  08 Oct 2020 07:00  --------------------------------------------------------  IN: 650 mL / OUT: 2130 mL / NET: -1480 mL    08 Oct 2020 07:  -  09 Oct 2020 01:06  --------------------------------------------------------  IN: 0 mL / OUT: 820 mL / NET: -820 mL      I&O's Detail    07 Oct 2020 07:01  -  08 Oct 2020 07:00  --------------------------------------------------------  IN:    IV PiggyBack: 150 mL    Oral Fluid: 500 mL  Total IN: 650 mL    OUT:    External Ventricular Device (mL): 230 mL    Voided (mL): 1900 mL  Total OUT: 2130 mL    Total NET: -1480 mL      08 Oct 2020 07:01  -  09 Oct 2020 01:06  --------------------------------------------------------  IN:  Total IN: 0 mL    OUT:    External Ventricular Device (mL): 120 mL    Voided (mL): 700 mL  Total OUT: 820 mL    Total NET: -820 mL          PHYSICAL EXAM:    General/Neuro      Alert & oriented x 3, no focal deficits    Lungs: Clear to auscultation, Normal expansion/effort.     Cardiovascular : S1, S2.  Regular rate and rhythm.  Peripheral edema   Cardiac Rhythm: Normal Sinus Rhythm    GI: Abdomen soft, Non-tender, Non-distended.       Wound: Drain site on lumbar spine shows mild serosanguinous discoloration of dressing, upon takedown, slight blood noted to ooze from drain site upon straining, no fluctuance/tenderness/erythema noted. Dressing replaced.     Extremities: Extremities warm, pink, well-perfused. Pulses palpable in bilateral upper and lower extremities.     Derm: Good skin turgor, no skin breakdown.      :      Shirley out, voiding.     MSK: Patient able to walk few steps with assistance.       CXR:   < from: Xray Chest 1 View- PORTABLE-Routine (10.09.20 @ 06:32) >  EXAM:  XR CHEST PORTABLE ROUTINE 1V        PROCEDURE DATE:  10/09/2020    INTERPRETATION:  Clinical History / Reason for exam: Shortness of breath.  Comparison : Chest radiograph 10/8/2020.  Technique/Positioning: Single AP chest radiograph. Radiograph is underexposed.  Findings:    Support devices: Telemetry leads overlie the thorax.    Cardiac/mediastinum/hilum: Unremarkable.    Lung parenchyma/Pleura: Low lung volumes no large focal opacity.    Skeleton/soft tissues: Unremarkable.    Impression:    No large focal opacity.  < end of copied text >        LABS:  CAPILLARY BLOOD GLUCOSE                              13.7   15.80 )-----------( 319      ( 09 Oct 2020 00:10 )             41.0       10-08    139  |  104  |  22<H>  ----------------------------<  108<H>  4.6   |  24  |  1.0    Ca    9.4      08 Oct 2020 00:14  Phos  4.8     10-08  Mg     2.0     10-08

## 2020-10-09 NOTE — PHYSICAL THERAPY INITIAL EVALUATION ADULT - PERTINENT HX OF CURRENT PROBLEM, REHAB EVAL
19 year POD x 6 S/P wound exploration, repair of CSF leak and placement of Lumbar drain.  Pt tolerating OOB and standing well yesterday,  pt has not had a BM yet, no other complaints and otherwise stable.  Will advance activity today and work with PT.  If tolerates PT well, will consider lowering lumbar drain rate

## 2020-10-09 NOTE — DIETITIAN INITIAL EVALUATION ADULT. - ENERGY NEEDS
Calories: 2947-5899 kcal/day (MSJ x 1-1.1 AF)--obese BMI considered  Protein:  g/day (1-1.2 gm/kg IBW)--same as above  Fluid: per SICU

## 2020-10-09 NOTE — DIETITIAN INITIAL EVALUATION ADULT. - NAME AND PHONE
Pt to maintain 100% of meals and snacks over the next 7 days. RD to monitor energy intake, electrolyte profile, nutrition focused physical findings

## 2020-10-09 NOTE — DIETITIAN INITIAL EVALUATION ADULT. - OTHER INFO
Nutrition Hx PTA: Pt with excellent appetite/po intake, typically consume 3 meals w/ snacks daily and denies use of oral nutrition supplements. Pt with no cultural/Synagogue restrictions and has NKFA. Pt follows regular diet at home.     Pt p/w recurrent CSF leak on POD #7; had been repaired at time of initial surgery. Now s/p primary repair of dura, application of duraseal and a fat plug. A spinal drain was placed for intermittent drainage of CSF.

## 2020-10-09 NOTE — PHYSICAL THERAPY INITIAL EVALUATION ADULT - GENERAL OBSERVATIONS, REHAB EVAL
11:30-12:00 & 13:30-14:00. chart reviewed. Pt received semi-yung at B/S, alert, oriented, able to follow multi-step instructions and agreeable to PT evaluation. + IV, + lumbar drain, + monitoring, Stable vitals, NAD, + B/L knee tendin jerk hyperreflexia, denies pain or discomfort. No headache, session interrupted with fluid leaking from back, Neurosurgery made aware, placed additional sutures to secure, Pt follow up in the afternoon, No leaking was noted, no headache, NAD.

## 2020-10-09 NOTE — PROGRESS NOTE ADULT - ASSESSMENT
Assesssment and Plan    20 y/o male with recurrent CSF leak on POD #13 (had been repaired at time of initial surgery), now POD#5  s/p primary repair of dura , application of duraseal and a fat plug with intermittent drainage of CSF  Neuro-  Pain control Tylenol standing,  D/c'd Oxy  Muscle spasm- Valium 5mg prn and Robaxin  Sensory and motor function intact  Lumbar drain to be opened q1hr and drain -10ml/hour   - plan to keep drain in x 3 more days  - continue q1 neurochecks  Management of drain as per NS    Pulm-  No chronic diagnosis  Currently on RA, saturating 93-95%  Encourage IS use    Cardiology-  No chronic diagnosis  Cardiac monitoring    GI-  Contiue regular diet as tolerated  Protonix PPX  Avoid constipation. Senna, miralax, increased lactulose to 10g q6h, mineral oil. Consider enema tomorrow if no bowel movement   -No bowel movement as of 0900 on 10/8  bisacodyl 5 milliGRAM(s) Oral at bedtime  polyethylene glycol 3350 17 Gram(s) Oral daily  senna 2 Tablet(s) Oral at bedtime   -added mineral oil    Renal  MAGNUS 2/2 acetozolamide resolved with hydration  BUN/Cr 22/1.0   IVL  Voiding adequately   Na 139/ K 4.6/ Mg 2.0/ Phos 4.8     ID  Mild leukocytosis uptrending, 11.38>13.68>15.80  Ancef until drain is discontinued  No signs of infection  WBC 13.68     Heme  H&H at baseline 13.7  DVT PPX: herpain subq   DVT sono 10/7: NEG     MSK-  MUÑOZ, motor strength 5/5 of BUE and 5/5 BLE  Activity: --may be OOB and make few steps to the commode as per NSG    Endo  No chronic diagnosis  FS ac and qhs with coverage as needed    Dispo: SICU    MSK-  MUÑOZ, motor strength 5/5 of BUE and 5/5 BLE  Activity: --may be OOB and make few steps to the commode as per NSG    Endo  No chronic diagnosis  FS ac and qhs with coverage as needed    Dispo: SICU   Assesssment and Plan    18 y/o male with recurrent CSF leak on POD #13 (had been repaired at time of initial surgery), now POD#5  s/p primary repair of dura , application of duraseal and a fat plug with intermittent drainage of CSF  Neuro-  Pain control Tylenol standing,  D/c'd Oxy  Muscle spasm- Valium 5mg prn and Robaxin  Sensory and motor function intact  Lumbar drain to be opened q1hr and drain -10ml/hour   - plan to keep drain in x 3 more days  - continue q1 neurochecks  Management of drain as per NS    Pulm-  No chronic diagnosis  Currently on RA, saturating 93-95%  Encourage IS use    Cardiology-  No chronic diagnosis  Cardiac monitoring    GI-  Contiue regular diet as tolerated  Protonix PPX  Avoid constipation. Senna, miralax, increased lactulose to 10g q6h, mineral oil. Consider enema tomorrow if no bowel movement   -No bowel movement as of 0900 on 10/8  bisacodyl 5 milliGRAM(s) Oral at bedtime  polyethylene glycol 3350 17 Gram(s) Oral daily  senna 2 Tablet(s) Oral at bedtime   -added mineral oil    Renal  MAGNUS 2/2 acetozolamide resolved with hydration  BUN/Cr 22/1.0   IVL  Voiding adequately   Na 139/ K 4.6/ Mg 2.0/ Phos 4.8     ID  Mild leukocytosis uptrending, 11.38>13.68>15.80  Ancef until drain is discontinued  No signs of infection  WBC 13.68     Heme  H&H at baseline 13.7  DVT PPX: herpain subq   DVT sono 10/7: NEG     MSK-  MUÑOZ, motor strength 5/5 of BUE and 5/5 BLE  Activity: --may be OOB and make few steps to the commode as per NSG    Endo  No chronic diagnosis  FS ac and qhs with coverage as needed    Dispo: SICU     Assesssment and Plan    20 y/o male with recurrent CSF leak on POD #14 (had been repaired at time of initial surgery), now POD#6 s/p primary repair of dura , application of duraseal and a fat plug with intermittent drainage of CSF  Neuro-  Pain control Tylenol standing,  D/c'd Oxy  Muscle spasm- Valium 5mg prn and Robaxin  Sensory and motor function intact  Lumbar drain to be opened q1hr and drain -10ml/hour   - plan to keep drain in x 3 more days  - continue q1 neurochecks  Management of drain as per NS    Pulm-  No chronic diagnosis  Currently on RA, saturating 93-95%  Encourage IS use    Cardiology-  No chronic diagnosis  Cardiac monitoring    GI-  Contiue regular diet as tolerated  Protonix PPX  Avoid constipation. Senna, miralax, increased lactulose to 10g q6h, mineral oil.  -No bowel movement as of 0900 on 10/8  bisacodyl 5 milliGRAM(s) Oral at bedtime  polyethylene glycol 3350 17 Gram(s) Oral daily  senna 2 Tablet(s) Oral at bedtime     Renal  MAGNUS 2/2 acetozolamide resolved with hydration  BUN/Cr 22/1.0   IVL  Voiding adequately   Na 139/ K 4.6/ Mg 2.0/ Phos 4.8     ID  Mild leukocytosis uptrending, 11.38>13.68>15.80  Ancef until drain is discontinued  No signs of infection  WBC 13.68     Heme  H&H at baseline 13.7  DVT PPX: herpain subq   DVT sono 10/7: NEG     MSK-  MUÑOZ, motor strength 5/5 of BUE and 5/5 BLE  Activity: ambulate with assistance. PT evaluation    Endo  No chronic diagnosis  FS ac and qhs with coverage as needed    Dispo: SICU with neurosurg management      Gave aware

## 2020-10-09 NOTE — PHYSICAL THERAPY INITIAL EVALUATION ADULT - BED MOBILITY TRAINING, PT EVAL
Pt will participate in supine to sit and reverse with CGA by discharge to return to private residence

## 2020-10-09 NOTE — DIETITIAN INITIAL EVALUATION ADULT. - PHYSICAL APPEARANCE
alert and oriented. BMI: 36.1, IBW: 214#, stable UBW, denies any recent wt changes. no edema noted and skin intact/obese

## 2020-10-09 NOTE — PROGRESS NOTE ADULT - SUBJECTIVE AND OBJECTIVE BOX
POD x 6   S/P Wound exploration, repair of CSF leak and placement of Lumbar drain    Pt seen and examined at bedside, no over night events, pt is laying in bed comfortably with no complaints.  Pt tolerating OOB and standing well yesterday.  Pt denies: weakness, paraesthesia, paralysis lower extremity radiculopathy          ^BACK PAIN, HEADACHE    Family history of diabetes mellitus (DM)    Handoff    MEWS Score    Anxiety    Anxiety    Depression    No pertinent past medical history    Postoperative CSF leak    Postoperative CSF leak    Back pain    Exploration, wound, lumbar region, with repair of dural defect if indicated, for CSF leak    S/P ACL repair    History of tonsillectomy    BACK SURGERY COMPLICATIONS    3    Headache    [email or web address]    S/P Wound exploration, repair of CSF leak and placement of Lumbar drain    Allergies    No Known Allergies    Intolerances        Vital Signs Last 24 Hrs  T(C): 36.9 (09 Oct 2020 03:00), Max: 36.9 (09 Oct 2020 03:00)  T(F): 98.4 (09 Oct 2020 03:00), Max: 98.4 (09 Oct 2020 03:00)  HR: 97 (09 Oct 2020 08:00) (82 - 125)  BP: 126/68 (09 Oct 2020 08:00) (111/57 - 153/82)  BP(mean): 89 (09 Oct 2020 08:00) (77 - 114)  RR: 16 (09 Oct 2020 08:00) (16 - 20)  SpO2: 98% (09 Oct 2020 08:00) (97% - 100%)      bisacodyl Suppository 10 milliGRAM(s) Rectal daily  ceFAZolin   IVPB 1000 milliGRAM(s) IV Intermittent every 8 hours  chlorhexidine 4% Liquid 1 Application(s) Topical <User Schedule>  cyclobenzaprine 10 milliGRAM(s) Oral <User Schedule> PRN  diazepam    Tablet 5 milliGRAM(s) Oral <User Schedule>  heparin   Injectable 7500 Unit(s) SubCutaneous every 8 hours  influenza   Vaccine 0.5 milliLiter(s) IntraMuscular once  lactulose Syrup 10 Gram(s) Oral every 6 hours  magnesium hydroxide Suspension 30 milliLiter(s) Oral daily  melatonin 5 milliGRAM(s) Oral at bedtime  mineral oil 30 milliLiter(s) Oral daily  multivitamin 1 Tablet(s) Oral daily  pantoprazole    Tablet 40 milliGRAM(s) Oral before breakfast  polyethylene glycol 3350 17 Gram(s) Oral daily  senna 2 Tablet(s) Oral at bedtime        10-08-20 @ 07:01  -  10-09-20 @ 07:00  --------------------------------------------------------  IN: 900 mL / OUT: 2295 mL / NET: -1395 mL    10-09-20 @ 07:01  -  10-09-20 @ 10:34  --------------------------------------------------------  IN: 0 mL / OUT: 10 mL / NET: -10 mL        REVIEW OF SYSTEMS    x[ ] A ten-point review of systems was otherwise negative except as noted.  [ ] Due to altered mental status/intubation, subjective information were not able to be obtained from the patient. History was obtained, to the extent possible, from review of the chart and collateral sources of information.      Exam:  AAOX3. Verbal function intact  tongue midline, facial motions symmetric  PERRLA, EOMI  Pronator Drift:   Finger to Nose intact  Motor: MAEx4, 5/5 power in b/l UE and LE  Sensation: intact to touch in all extremities        CBC Full  -  ( 09 Oct 2020 00:10 )  WBC Count : 15.80 K/uL  RBC Count : 4.67 M/uL  Hemoglobin : 13.7 g/dL  Hematocrit : 41.0 %  Platelet Count - Automated : 319 K/uL  Mean Cell Volume : 87.8 fL  Mean Cell Hemoglobin : 29.3 pg  Mean Cell Hemoglobin Concentration : 33.4 g/dL  Auto Neutrophil # : x  Auto Lymphocyte # : x  Auto Monocyte # : x  Auto Eosinophil # : x  Auto Basophil # : x  Auto Neutrophil % : x  Auto Lymphocyte % : x  Auto Monocyte % : x  Auto Eosinophil % : x  Auto Basophil % : x    10-09    138  |  104  |  23<H>  ----------------------------<  112<H>  4.7   |  23  |  0.8    Ca    9.4      09 Oct 2020 00:10  Phos  5.3     10-09  Mg     2.0     10-09      PT/INR - ( 09 Oct 2020 00:10 )   PT: 13.70 sec;   INR: 1.19 ratio         PTT - ( 09 Oct 2020 00:10 )  PTT:31.9 sec        Wound: clean dry and intact        Assessment/Plan:   This is a 19 year POD x 6 S/P wound exploration, repair of CSF leak and placement of Lumbar drain.  Pt tolerating OOB and standing well yesterday,  pt has not had a BM yet, no other complaints and otherwise stable.        -PT rehab today, ok to mobilize OOB and ambulate   -If pt tolerates PT, will consider lowering Lumbar drain to 5 ml/hr  -pain control  -monitor neuro checks  -Continue home meds  -Medical management per SICU   POD x 6   S/P Wound exploration, repair of CSF leak and placement of Lumbar drain    Pt seen and examined at bedside, no over night events, pt is laying in bed comfortably with no complaints.  Pt tolerating OOB and standing well yesterday.  Pt denies: weakness, paraesthesia, paralysis lower extremity radiculopathy          ^BACK PAIN, HEADACHE    Family history of diabetes mellitus (DM)    Handoff    MEWS Score    Anxiety    Anxiety    Depression    No pertinent past medical history    Postoperative CSF leak    Postoperative CSF leak    Back pain    Exploration, wound, lumbar region, with repair of dural defect if indicated, for CSF leak    S/P ACL repair    History of tonsillectomy    BACK SURGERY COMPLICATIONS    3    Headache    [email or web address]    S/P Wound exploration, repair of CSF leak and placement of Lumbar drain    Allergies    No Known Allergies    Intolerances        Vital Signs Last 24 Hrs  T(C): 36.9 (09 Oct 2020 03:00), Max: 36.9 (09 Oct 2020 03:00)  T(F): 98.4 (09 Oct 2020 03:00), Max: 98.4 (09 Oct 2020 03:00)  HR: 97 (09 Oct 2020 08:00) (82 - 125)  BP: 126/68 (09 Oct 2020 08:00) (111/57 - 153/82)  BP(mean): 89 (09 Oct 2020 08:00) (77 - 114)  RR: 16 (09 Oct 2020 08:00) (16 - 20)  SpO2: 98% (09 Oct 2020 08:00) (97% - 100%)      bisacodyl Suppository 10 milliGRAM(s) Rectal daily  ceFAZolin   IVPB 1000 milliGRAM(s) IV Intermittent every 8 hours  chlorhexidine 4% Liquid 1 Application(s) Topical <User Schedule>  cyclobenzaprine 10 milliGRAM(s) Oral <User Schedule> PRN  diazepam    Tablet 5 milliGRAM(s) Oral <User Schedule>  heparin   Injectable 7500 Unit(s) SubCutaneous every 8 hours  influenza   Vaccine 0.5 milliLiter(s) IntraMuscular once  lactulose Syrup 10 Gram(s) Oral every 6 hours  magnesium hydroxide Suspension 30 milliLiter(s) Oral daily  melatonin 5 milliGRAM(s) Oral at bedtime  mineral oil 30 milliLiter(s) Oral daily  multivitamin 1 Tablet(s) Oral daily  pantoprazole    Tablet 40 milliGRAM(s) Oral before breakfast  polyethylene glycol 3350 17 Gram(s) Oral daily  senna 2 Tablet(s) Oral at bedtime        10-08-20 @ 07:01  -  10-09-20 @ 07:00  --------------------------------------------------------  IN: 900 mL / OUT: 2295 mL / NET: -1395 mL    10-09-20 @ 07:01  -  10-09-20 @ 10:34  --------------------------------------------------------  IN: 0 mL / OUT: 10 mL / NET: -10 mL        REVIEW OF SYSTEMS    x[ ] A ten-point review of systems was otherwise negative except as noted.  [ ] Due to altered mental status/intubation, subjective information were not able to be obtained from the patient. History was obtained, to the extent possible, from review of the chart and collateral sources of information.      Exam:  AAOX3. Verbal function intact  tongue midline, facial motions symmetric  PERRLA, EOMI  Pronator Drift:   Finger to Nose intact  Motor: MAEx4, 5/5 power in b/l UE and LE  Sensation: intact to touch in all extremities        CBC Full  -  ( 09 Oct 2020 00:10 )  WBC Count : 15.80 K/uL  RBC Count : 4.67 M/uL  Hemoglobin : 13.7 g/dL  Hematocrit : 41.0 %  Platelet Count - Automated : 319 K/uL  Mean Cell Volume : 87.8 fL  Mean Cell Hemoglobin : 29.3 pg  Mean Cell Hemoglobin Concentration : 33.4 g/dL  Auto Neutrophil # : x  Auto Lymphocyte # : x  Auto Monocyte # : x  Auto Eosinophil # : x  Auto Basophil # : x  Auto Neutrophil % : x  Auto Lymphocyte % : x  Auto Monocyte % : x  Auto Eosinophil % : x  Auto Basophil % : x    10-09    138  |  104  |  23<H>  ----------------------------<  112<H>  4.7   |  23  |  0.8    Ca    9.4      09 Oct 2020 00:10  Phos  5.3     10-09  Mg     2.0     10-09      PT/INR - ( 09 Oct 2020 00:10 )   PT: 13.70 sec;   INR: 1.19 ratio         PTT - ( 09 Oct 2020 00:10 )  PTT:31.9 sec        Wound: clean dry and intact        Assessment/Plan:   This is a 19 year POD x 6 S/P wound exploration, repair of CSF leak and placement of Lumbar drain.  Pt tolerating OOB and standing well yesterday,  pt has not had a BM yet, no other complaints and otherwise stable.  Will advance activity today and work with PT.  If tolerates PT well, will consider lowering lumbar drain.        -PT rehab today, ok to mobilize OOB and ambulate   -If pt tolerates PT, will consider lowering Lumbar drain to 5 ml/hr  -pain control  -monitor neuro checks  -Continue home meds  -DVT prophylaxis   -Medical management per SICU   POD x 6   S/P Wound exploration, repair of CSF leak and placement of Lumbar drain    Pt seen and examined at bedside, no over night events, pt is laying in bed comfortably with no complaints.  Pt tolerating OOB and standing well yesterday.  Pt denies: weakness, paraesthesia, paralysis lower extremity radiculopathy          ^BACK PAIN, HEADACHE    Family history of diabetes mellitus (DM)    Handoff    MEWS Score    Anxiety    Anxiety    Depression    No pertinent past medical history    Postoperative CSF leak    Postoperative CSF leak    Back pain    Exploration, wound, lumbar region, with repair of dural defect if indicated, for CSF leak    S/P ACL repair    History of tonsillectomy    BACK SURGERY COMPLICATIONS    3    Headache    [email or web address]    S/P Wound exploration, repair of CSF leak and placement of Lumbar drain    Allergies    No Known Allergies    Intolerances        Vital Signs Last 24 Hrs  T(C): 36.9 (09 Oct 2020 03:00), Max: 36.9 (09 Oct 2020 03:00)  T(F): 98.4 (09 Oct 2020 03:00), Max: 98.4 (09 Oct 2020 03:00)  HR: 97 (09 Oct 2020 08:00) (82 - 125)  BP: 126/68 (09 Oct 2020 08:00) (111/57 - 153/82)  BP(mean): 89 (09 Oct 2020 08:00) (77 - 114)  RR: 16 (09 Oct 2020 08:00) (16 - 20)  SpO2: 98% (09 Oct 2020 08:00) (97% - 100%)      bisacodyl Suppository 10 milliGRAM(s) Rectal daily  ceFAZolin   IVPB 1000 milliGRAM(s) IV Intermittent every 8 hours  chlorhexidine 4% Liquid 1 Application(s) Topical <User Schedule>  cyclobenzaprine 10 milliGRAM(s) Oral <User Schedule> PRN  diazepam    Tablet 5 milliGRAM(s) Oral <User Schedule>  heparin   Injectable 7500 Unit(s) SubCutaneous every 8 hours  influenza   Vaccine 0.5 milliLiter(s) IntraMuscular once  lactulose Syrup 10 Gram(s) Oral every 6 hours  magnesium hydroxide Suspension 30 milliLiter(s) Oral daily  melatonin 5 milliGRAM(s) Oral at bedtime  mineral oil 30 milliLiter(s) Oral daily  multivitamin 1 Tablet(s) Oral daily  pantoprazole    Tablet 40 milliGRAM(s) Oral before breakfast  polyethylene glycol 3350 17 Gram(s) Oral daily  senna 2 Tablet(s) Oral at bedtime        10-08-20 @ 07:01  -  10-09-20 @ 07:00  --------------------------------------------------------  IN: 900 mL / OUT: 2295 mL / NET: -1395 mL    10-09-20 @ 07:01  -  10-09-20 @ 10:34  --------------------------------------------------------  IN: 0 mL / OUT: 10 mL / NET: -10 mL        REVIEW OF SYSTEMS    x[ ] A ten-point review of systems was otherwise negative except as noted.  [ ] Due to altered mental status/intubation, subjective information were not able to be obtained from the patient. History was obtained, to the extent possible, from review of the chart and collateral sources of information.      Exam:  AAOX3. Verbal function intact  tongue midline, facial motions symmetric  PERRLA, EOMI  Pronator Drift:   Finger to Nose intact  Motor: MAEx4, 5/5 power in b/l UE and LE  Sensation: intact to touch in all extremities        CBC Full  -  ( 09 Oct 2020 00:10 )  WBC Count : 15.80 K/uL  RBC Count : 4.67 M/uL  Hemoglobin : 13.7 g/dL  Hematocrit : 41.0 %  Platelet Count - Automated : 319 K/uL  Mean Cell Volume : 87.8 fL  Mean Cell Hemoglobin : 29.3 pg  Mean Cell Hemoglobin Concentration : 33.4 g/dL  Auto Neutrophil # : x  Auto Lymphocyte # : x  Auto Monocyte # : x  Auto Eosinophil # : x  Auto Basophil # : x  Auto Neutrophil % : x  Auto Lymphocyte % : x  Auto Monocyte % : x  Auto Eosinophil % : x  Auto Basophil % : x    10-09    138  |  104  |  23<H>  ----------------------------<  112<H>  4.7   |  23  |  0.8    Ca    9.4      09 Oct 2020 00:10  Phos  5.3     10-09  Mg     2.0     10-09      PT/INR - ( 09 Oct 2020 00:10 )   PT: 13.70 sec;   INR: 1.19 ratio         PTT - ( 09 Oct 2020 00:10 )  PTT:31.9 sec        Wound: clean, dry, intact and well healing  Lumbar drain: set at 10 ml/hr        Assessment/Plan:   This is a 19 year POD x 6 S/P wound exploration, repair of CSF leak and placement of Lumbar drain.  Pt tolerating OOB and standing well yesterday,  pt has not had a BM yet, no other complaints and otherwise stable.  Will advance activity today and work with PT.  If tolerates PT well, will consider lowering lumbar drain rate        -PT rehab today, ok to mobilize OOB and ambulate   -If pt tolerates PT, will consider lowering Lumbar drain to 5 ml/hr  -pain control  -monitor neuro checks  -Continue home meds  -DVT prophylaxis   -Medical management per SICU

## 2020-10-10 LAB
ANION GAP SERPL CALC-SCNC: 13 MMOL/L — SIGNIFICANT CHANGE UP (ref 7–14)
BUN SERPL-MCNC: 25 MG/DL — HIGH (ref 10–20)
CALCIUM SERPL-MCNC: 9.4 MG/DL — SIGNIFICANT CHANGE UP (ref 8.5–10.1)
CHLORIDE SERPL-SCNC: 104 MMOL/L — SIGNIFICANT CHANGE UP (ref 98–110)
CO2 SERPL-SCNC: 21 MMOL/L — SIGNIFICANT CHANGE UP (ref 17–32)
CREAT SERPL-MCNC: 0.9 MG/DL — SIGNIFICANT CHANGE UP (ref 0.3–1)
GLUCOSE SERPL-MCNC: 123 MG/DL — HIGH (ref 70–99)
HCT VFR BLD CALC: 40.4 % — LOW (ref 42–52)
HGB BLD-MCNC: 13.1 G/DL — LOW (ref 14–18)
MAGNESIUM SERPL-MCNC: 1.8 MG/DL — SIGNIFICANT CHANGE UP (ref 1.8–2.4)
MCHC RBC-ENTMCNC: 29.6 PG — SIGNIFICANT CHANGE UP (ref 27–31)
MCHC RBC-ENTMCNC: 32.4 G/DL — SIGNIFICANT CHANGE UP (ref 32–37)
MCV RBC AUTO: 91.2 FL — SIGNIFICANT CHANGE UP (ref 80–94)
NRBC # BLD: 0 /100 WBCS — SIGNIFICANT CHANGE UP (ref 0–0)
PHOSPHATE SERPL-MCNC: 3.8 MG/DL — SIGNIFICANT CHANGE UP (ref 2.1–4.9)
PLATELET # BLD AUTO: 288 K/UL — SIGNIFICANT CHANGE UP (ref 130–400)
POTASSIUM SERPL-MCNC: 4.1 MMOL/L — SIGNIFICANT CHANGE UP (ref 3.5–5)
POTASSIUM SERPL-SCNC: 4.1 MMOL/L — SIGNIFICANT CHANGE UP (ref 3.5–5)
RBC # BLD: 4.43 M/UL — LOW (ref 4.7–6.1)
RBC # FLD: 13.1 % — SIGNIFICANT CHANGE UP (ref 11.5–14.5)
SODIUM SERPL-SCNC: 138 MMOL/L — SIGNIFICANT CHANGE UP (ref 135–146)
WBC # BLD: 13.93 K/UL — HIGH (ref 4.8–10.8)
WBC # FLD AUTO: 13.93 K/UL — HIGH (ref 4.8–10.8)

## 2020-10-10 PROCEDURE — 71045 X-RAY EXAM CHEST 1 VIEW: CPT | Mod: 26

## 2020-10-10 PROCEDURE — 99291 CRITICAL CARE FIRST HOUR: CPT

## 2020-10-10 RX ORDER — MAGNESIUM SULFATE 500 MG/ML
1 VIAL (ML) INJECTION ONCE
Refills: 0 | Status: COMPLETED | OUTPATIENT
Start: 2020-10-10 | End: 2020-10-10

## 2020-10-10 RX ADMIN — Medication 5 MILLIGRAM(S): at 09:52

## 2020-10-10 RX ADMIN — HEPARIN SODIUM 7500 UNIT(S): 5000 INJECTION INTRAVENOUS; SUBCUTANEOUS at 21:03

## 2020-10-10 RX ADMIN — HEPARIN SODIUM 7500 UNIT(S): 5000 INJECTION INTRAVENOUS; SUBCUTANEOUS at 13:30

## 2020-10-10 RX ADMIN — Medication 100 MILLIGRAM(S): at 22:53

## 2020-10-10 RX ADMIN — Medication 1 TABLET(S): at 11:05

## 2020-10-10 RX ADMIN — Medication 50 GRAM(S): at 01:44

## 2020-10-10 RX ADMIN — CHLORHEXIDINE GLUCONATE 1 APPLICATION(S): 213 SOLUTION TOPICAL at 05:06

## 2020-10-10 RX ADMIN — Medication 1 ENEMA: at 15:34

## 2020-10-10 RX ADMIN — CYCLOBENZAPRINE HYDROCHLORIDE 10 MILLIGRAM(S): 10 TABLET, FILM COATED ORAL at 21:21

## 2020-10-10 RX ADMIN — LACTULOSE 10 GRAM(S): 10 SOLUTION ORAL at 05:04

## 2020-10-10 RX ADMIN — LACTULOSE 10 GRAM(S): 10 SOLUTION ORAL at 11:05

## 2020-10-10 RX ADMIN — LACTULOSE 10 GRAM(S): 10 SOLUTION ORAL at 17:00

## 2020-10-10 RX ADMIN — HEPARIN SODIUM 7500 UNIT(S): 5000 INJECTION INTRAVENOUS; SUBCUTANEOUS at 05:03

## 2020-10-10 RX ADMIN — PANTOPRAZOLE SODIUM 40 MILLIGRAM(S): 20 TABLET, DELAYED RELEASE ORAL at 07:34

## 2020-10-10 RX ADMIN — Medication 100 MILLIGRAM(S): at 05:03

## 2020-10-10 RX ADMIN — Medication 5 MILLIGRAM(S): at 22:53

## 2020-10-10 RX ADMIN — POLYETHYLENE GLYCOL 3350 17 GRAM(S): 17 POWDER, FOR SOLUTION ORAL at 12:08

## 2020-10-10 RX ADMIN — Medication 5 MILLIGRAM(S): at 17:00

## 2020-10-10 RX ADMIN — MAGNESIUM HYDROXIDE 30 MILLILITER(S): 400 TABLET, CHEWABLE ORAL at 12:08

## 2020-10-10 RX ADMIN — Medication 10 MILLIGRAM(S): at 11:07

## 2020-10-10 RX ADMIN — SENNA PLUS 2 TABLET(S): 8.6 TABLET ORAL at 21:02

## 2020-10-10 RX ADMIN — Medication 30 MILLILITER(S): at 12:07

## 2020-10-10 NOTE — PROGRESS NOTE ADULT - ASSESSMENT
Assesssment and Plan  20 y/o male with recurrent CSF leak on POD #14 (had been repaired at time of initial surgery), now POD#6 s/p primary repair of dura , application of duraseal and a fat plug with intermittent drainage of CSF    Neuro-  Pain control Tylenol standing,  D/c'd Oxy  Muscle spasm- Valium 5mg prn and Robaxin  Sensory and motor function intact  Lumbar drain to be opened q1hr and drain  - now draining 5cc/hr  - draining around lumbar drain, placed another stitch  - plan to keep drain in x 3 more days  - continue q1 neurochecks  Management of drain as per NS    Pulm-  No chronic diagnosis  Currently on RA, saturating 93-95%  Encourage IS use    Cardiology-  No chronic diagnosis  Cardiac monitoring    GI-  Contiue regular diet as tolerated  Protonix PPX  Avoid constipation. Senna, miralax, increased lactulose to 10g q6h, mineral oil.  -No bowel movement as of 0900 on 10/8  bisacodyl 5 milliGRAM(s) Oral at bedtime  polyethylene glycol 3350 17 Gram(s) Oral daily  senna 2 Tablet(s) Oral at bedtime     Renal  MAGNUS 2/2 acetozolamide resolved with hydration  BUN/Cr 25/0.9   IVL  Voiding adequately   Na 138/ K 4.1/ Mg 1.8 - repleted/ Phos 3.8    ID  Mild leukocytosis uptrending, 11.38>13.68>15.80>13.93  Ancef until drain is discontinued  No signs of infection      Heme  H&H at baseline 13.1  DVT PPX: heparin subq   DVT sono 10/7: NEG     MSK-  MUÑOZ, motor strength 5/5 of BUE and 5/5 BLE  Activity: ambulate with assistance. PT evaluation    Endo  No chronic diagnosis  FS ac and qhs with coverage as needed    Dispo: SICU with neurosurg management        Assesssment and Plan  18 y/o male with recurrent CSF leak on POD #14 (had been repaired at time of initial surgery), now POD#6 s/p primary repair of dura, application of duraseal and a fat plug with intermittent drainage of CSF    Neuro-  Pain control Tylenol standing,  D/c'd Oxy  Muscle spasm- Valium 5mg prn and Robaxin  Sensory and motor function intact  Lumbar drain to be opened q1hr and drain  - now draining 5cc/hr  - draining around lumbar drain, placed another stitch 10/9  - plan to keep drain in x 3 more days  - continue q1 neurochecks  Management of drain as per NS    Pulm-  No chronic diagnosis  Currently on RA, saturating 93-95%  Encourage IS use    Cardiology-  No chronic diagnosis  Cardiac monitoring    GI-  Contiue regular diet as tolerated  Protonix PPX  Avoid constipation. Senna, miralax, increased lactulose to 10g q6h, mineral oil.  -No bowel movement as of 0900 on 10/10  bisacodyl 5 milliGRAM(s) Oral at bedtime  polyethylene glycol 3350 17 Gram(s) Oral daily  senna 2 Tablet(s) Oral at bedtime     Renal  MAGNUS 2/2 acetozolamide resolved with hydration  BUN/Cr 25/0.9   IVL  Voiding adequately   Na 138/ K 4.1/ Mg 1.8 - repleted/ Phos 3.8    ID  Mild leukocytosis uptrending, 11.38>13.68>15.80>13.93  Ancef until drain is discontinued  No signs of infection    Heme  H&H at baseline 13.1  DVT PPX: heparin subq   DVT sono 10/7: NEG     MSK-  MUÑOZ, motor strength 5/5 of BUE and 5/5 BLE  Activity: ambulate with assistance. PT evaluation    Endo  No chronic diagnosis  FS ac and qhs with coverage as needed    Dispo: SICU with neurosurg management

## 2020-10-10 NOTE — PROGRESS NOTE ADULT - SUBJECTIVE AND OBJECTIVE BOX
Subjective: 19yMale with a pmhx of BACK PAIN, HEADACHE    ^BACK PAIN, HEADACHE    Family history of diabetes mellitus (DM)    Handoff    MEWS Score    Anxiety    Anxiety    Depression    No pertinent past medical history    Postoperative CSF leak    Postoperative CSF leak    Back pain    Exploration, wound, lumbar region, with repair of dural defect if indicated, for CSF leak    S/P ACL repair    History of tonsillectomy    BACK SURGERY COMPLICATIONS    3    Headache    SysAdmin_VisitLink        POD # 7  S/P Wound exploration, repair of CSF leak and placement of Lumbar drain    Pt seen and examined at bedside.  Pt c/o mild back pain.  Also c/o constipation.  Sts he has not gotten Dulcolax suppository.  According to task viewer, pt has refused suppository  for past 3 days.  Last dose was 10.6 and pt has agreed to take dose today.  Pt has had 5cc/ hr of CSF drawn from lumbar drain since yesterday.  Pt sts he feels good and currently denies headaches.  LD clamped today.     Allergies    No Known Allergies    Intolerances        Vital Signs Last 24 Hrs  T(C): 36.4 (10 Oct 2020 08:00), Max: 36.9 (10 Oct 2020 06:00)  T(F): 97.5 (10 Oct 2020 08:00), Max: 98.5 (10 Oct 2020 06:00)  HR: 99 (10 Oct 2020 10:00) (88 - 117)  BP: 117/59 (10 Oct 2020 09:00) (92/60 - 157/66)  BP(mean): 81 (10 Oct 2020 09:00) (67 - 95)  RR: 16 (10 Oct 2020 09:00) (16 - 20)  SpO2: 100% (10 Oct 2020 10:00) (95% - 100%)      bisacodyl Suppository 10 milliGRAM(s) Rectal daily  ceFAZolin   IVPB 1000 milliGRAM(s) IV Intermittent every 8 hours  chlorhexidine 4% Liquid 1 Application(s) Topical <User Schedule>  cyclobenzaprine 10 milliGRAM(s) Oral <User Schedule> PRN  diazepam    Tablet 5 milliGRAM(s) Oral <User Schedule>  heparin   Injectable 7500 Unit(s) SubCutaneous every 8 hours  influenza   Vaccine 0.5 milliLiter(s) IntraMuscular once  lactulose Syrup 10 Gram(s) Oral every 6 hours  magnesium hydroxide Suspension 30 milliLiter(s) Oral daily  melatonin 5 milliGRAM(s) Oral at bedtime  mineral oil 30 milliLiter(s) Oral daily  multivitamin 1 Tablet(s) Oral daily  pantoprazole    Tablet 40 milliGRAM(s) Oral before breakfast  polyethylene glycol 3350 17 Gram(s) Oral daily  senna 2 Tablet(s) Oral at bedtime        10-09-20 @ 07:01  -  10-10-20 @ 07:00  --------------------------------------------------------  IN: 1150 mL / OUT: 1805 mL / NET: -655 mL    10-10-20 @ 07:01  -  10-10-20 @ 11:39  --------------------------------------------------------  IN: 0 mL / OUT: 10 mL / NET: -10 mL        REVIEW OF SYSTEMS    [ x] A ten-point review of systems was otherwise negative except as noted.  [ ] Due to altered mental status/intubation, subjective information were not able to be obtained from the patient. History was obtained, to the extent possible, from review of the chart and collateral sources of information.      Exam:  AAOX3. Verbal function intact  follows commands  Motor: MAEx4  5/5 power in b/l LE  Sensation: intact to LT b/l equal  Dressing clean and intact    CBC Full  -  ( 09 Oct 2020 23:41 )  WBC Count : 13.93 K/uL  RBC Count : 4.43 M/uL  Hemoglobin : 13.1 g/dL  Hematocrit : 40.4 %  Platelet Count - Automated : 288 K/uL  Mean Cell Volume : 91.2 fL  Mean Cell Hemoglobin : 29.6 pg  Mean Cell Hemoglobin Concentration : 32.4 g/dL  Auto Neutrophil # : x  Auto Lymphocyte # : x  Auto Monocyte # : x  Auto Eosinophil # : x  Auto Basophil # : x  Auto Neutrophil % : x  Auto Lymphocyte % : x  Auto Monocyte % : x  Auto Eosinophil % : x  Auto Basophil % : x    10-09    138  |  104  |  25<H>  ----------------------------<  123<H>  4.1   |  21  |  0.9    Ca    9.4      09 Oct 2020 23:41  Phos  3.8     10-09  Mg     1.8     10-09      PT/INR - ( 09 Oct 2020 00:10 )   PT: 13.70 sec;   INR: 1.19 ratio         PTT - ( 09 Oct 2020 00:10 )  PTT:31.9 sec          Assessment/Plan:   clamp lumbar drain today  monitor for headaches  cont bowel regimen  Dulcolax supp today  if no BM by tomorrow, may need enema  may get OOB, walk to bathroom  d/w attg

## 2020-10-10 NOTE — PROGRESS NOTE ADULT - SUBJECTIVE AND OBJECTIVE BOX
Patient name: Manohar Aguayo  Age 19 yrs    Indication for ICU admission:  Spinal drain    Admit Date:  10/2  ICU Date:      10/3  OR Date:      10/3    No Known Allergies    PAST MEDICAL & SURGICAL HISTORY:  Anxiety  S/P ACL repair  x2  History of tonsillectomy  Home Medications:       18 y/o male with recurrent CSF leak on POD #7  (had been repaired at time of initial surgery)  Now s/p primary repair of dura , application of duraseal and a fat plug  A spinal drain was placed for intermittant drainage of CSF    24 HOUR EVENTS:  DAY:  -activity increased to activity with assistance   -drain leaked when patient was standing, neurosx placed extra stitch  -DVT sono 10/6 negaive     O/N: Mag 1.8, repleted 1g, f/u 1100 labs    DVT PTX: heparin   Injectable 7500 Unit(s) SubCutaneous every 8 hours    GI PTX: pantoprazole    Tablet 40 milliGRAM(s) Oral before breakfast    ***Tubes/Lines/Drains  ***  Peripheral IV    [ x]A ten-point review of systems was otherwise negative except as noted above.  [ ]Due to altered mental status/intubation, subjective information was not attained from the patient.  History was obtained, to the extent possible, from review of the chart and collateral sources of information.      Patient name: Manohar Aguayo  Age 19 yrs    Indication for ICU admission:  Spinal drain    Admit Date:  10/2  ICU Date:      10/3  OR Date:      10/3    No Known Allergies    PAST MEDICAL & SURGICAL HISTORY:  Anxiety  S/P ACL repair  x2  History of tonsillectomy  Home Medications:       20 y/o male with recurrent CSF leak on POD #7  (had been repaired at time of initial surgery)  Now s/p primary repair of dura , application of duraseal and a fat plug  A spinal drain was placed for intermittant drainage of CSF      24 HOUR EVENTS:  DAY:  -activity increased to activity with assistance   -drain leaked when patient was standing, neurosx placed extra stitch  -DVT sono 10/6 negaive     O/N: Mag 1.8, repleted 1g, f/u 1100 labs    DVT PTX: heparin   Injectable 7500 Unit(s) SubCutaneous every 8 hours    GI PTX: pantoprazole    Tablet 40 milliGRAM(s) Oral before breakfast    ***Tubes/Lines/Drains  ***  Peripheral IV    [ x]A ten-point review of systems was otherwise negative except as noted above.  [ ]Due to altered mental status/intubation, subjective information was not attained from the patient.  History was obtained, to the extent possible, from review of the chart and collateral sources of information.

## 2020-10-11 LAB
ANION GAP SERPL CALC-SCNC: 11 MMOL/L — SIGNIFICANT CHANGE UP (ref 7–14)
BUN SERPL-MCNC: 21 MG/DL — HIGH (ref 10–20)
CALCIUM SERPL-MCNC: 9.3 MG/DL — SIGNIFICANT CHANGE UP (ref 8.5–10.1)
CHLORIDE SERPL-SCNC: 105 MMOL/L — SIGNIFICANT CHANGE UP (ref 98–110)
CO2 SERPL-SCNC: 23 MMOL/L — SIGNIFICANT CHANGE UP (ref 17–32)
CREAT SERPL-MCNC: 1 MG/DL — SIGNIFICANT CHANGE UP (ref 0.3–1)
GLUCOSE SERPL-MCNC: 116 MG/DL — HIGH (ref 70–99)
HCT VFR BLD CALC: 36.5 % — LOW (ref 42–52)
HGB BLD-MCNC: 11.9 G/DL — LOW (ref 14–18)
MAGNESIUM SERPL-MCNC: 2 MG/DL — SIGNIFICANT CHANGE UP (ref 1.8–2.4)
MCHC RBC-ENTMCNC: 29.6 PG — SIGNIFICANT CHANGE UP (ref 27–31)
MCHC RBC-ENTMCNC: 32.6 G/DL — SIGNIFICANT CHANGE UP (ref 32–37)
MCV RBC AUTO: 90.8 FL — SIGNIFICANT CHANGE UP (ref 80–94)
NRBC # BLD: 0 /100 WBCS — SIGNIFICANT CHANGE UP (ref 0–0)
PHOSPHATE SERPL-MCNC: 4.6 MG/DL — SIGNIFICANT CHANGE UP (ref 2.1–4.9)
PLATELET # BLD AUTO: 284 K/UL — SIGNIFICANT CHANGE UP (ref 130–400)
POTASSIUM SERPL-MCNC: 4.2 MMOL/L — SIGNIFICANT CHANGE UP (ref 3.5–5)
POTASSIUM SERPL-SCNC: 4.2 MMOL/L — SIGNIFICANT CHANGE UP (ref 3.5–5)
RBC # BLD: 4.02 M/UL — LOW (ref 4.7–6.1)
RBC # FLD: 13.1 % — SIGNIFICANT CHANGE UP (ref 11.5–14.5)
SODIUM SERPL-SCNC: 139 MMOL/L — SIGNIFICANT CHANGE UP (ref 135–146)
WBC # BLD: 11.72 K/UL — HIGH (ref 4.8–10.8)
WBC # FLD AUTO: 11.72 K/UL — HIGH (ref 4.8–10.8)

## 2020-10-11 PROCEDURE — 99291 CRITICAL CARE FIRST HOUR: CPT

## 2020-10-11 PROCEDURE — 71045 X-RAY EXAM CHEST 1 VIEW: CPT | Mod: 26

## 2020-10-11 RX ORDER — ACETAMINOPHEN 500 MG
650 TABLET ORAL ONCE
Refills: 0 | Status: COMPLETED | OUTPATIENT
Start: 2020-10-11 | End: 2020-10-11

## 2020-10-11 RX ORDER — MORPHINE SULFATE 50 MG/1
2 CAPSULE, EXTENDED RELEASE ORAL ONCE
Refills: 0 | Status: DISCONTINUED | OUTPATIENT
Start: 2020-10-11 | End: 2020-10-11

## 2020-10-11 RX ADMIN — MAGNESIUM HYDROXIDE 30 MILLILITER(S): 400 TABLET, CHEWABLE ORAL at 11:51

## 2020-10-11 RX ADMIN — CYCLOBENZAPRINE HYDROCHLORIDE 10 MILLIGRAM(S): 10 TABLET, FILM COATED ORAL at 16:07

## 2020-10-11 RX ADMIN — Medication 100 MILLIGRAM(S): at 06:00

## 2020-10-11 RX ADMIN — Medication 100 MILLIGRAM(S): at 13:55

## 2020-10-11 RX ADMIN — LACTULOSE 10 GRAM(S): 10 SOLUTION ORAL at 11:51

## 2020-10-11 RX ADMIN — Medication 650 MILLIGRAM(S): at 21:00

## 2020-10-11 RX ADMIN — HEPARIN SODIUM 7500 UNIT(S): 5000 INJECTION INTRAVENOUS; SUBCUTANEOUS at 06:00

## 2020-10-11 RX ADMIN — POLYETHYLENE GLYCOL 3350 17 GRAM(S): 17 POWDER, FOR SOLUTION ORAL at 11:49

## 2020-10-11 RX ADMIN — Medication 30 MILLILITER(S): at 11:51

## 2020-10-11 RX ADMIN — Medication 5 MILLIGRAM(S): at 21:53

## 2020-10-11 RX ADMIN — MORPHINE SULFATE 2 MILLIGRAM(S): 50 CAPSULE, EXTENDED RELEASE ORAL at 19:33

## 2020-10-11 RX ADMIN — SENNA PLUS 2 TABLET(S): 8.6 TABLET ORAL at 21:53

## 2020-10-11 RX ADMIN — PANTOPRAZOLE SODIUM 40 MILLIGRAM(S): 20 TABLET, DELAYED RELEASE ORAL at 07:49

## 2020-10-11 RX ADMIN — HEPARIN SODIUM 7500 UNIT(S): 5000 INJECTION INTRAVENOUS; SUBCUTANEOUS at 13:55

## 2020-10-11 RX ADMIN — MORPHINE SULFATE 2 MILLIGRAM(S): 50 CAPSULE, EXTENDED RELEASE ORAL at 18:16

## 2020-10-11 RX ADMIN — CHLORHEXIDINE GLUCONATE 1 APPLICATION(S): 213 SOLUTION TOPICAL at 06:01

## 2020-10-11 RX ADMIN — Medication 1 TABLET(S): at 11:48

## 2020-10-11 RX ADMIN — Medication 650 MILLIGRAM(S): at 20:32

## 2020-10-11 RX ADMIN — HEPARIN SODIUM 7500 UNIT(S): 5000 INJECTION INTRAVENOUS; SUBCUTANEOUS at 21:53

## 2020-10-11 NOTE — PROGRESS NOTE ADULT - NSHPATTENDINGPLANDISCUSS_GEN_ALL_CORE
Neurosurgery PA
neurosurgery Anabelle Shaw
neurosurgery PA
neurosurgery KRISHNA Del Rosario
SICU Team
patient , NS and SICU Team
SICU Team
SICU Team

## 2020-10-11 NOTE — PROGRESS NOTE ADULT - SUBJECTIVE AND OBJECTIVE BOX
Subjective: 19yMale with a pmhx of BACK PAIN, HEADACHE    ^BACK PAIN, HEADACHE    Family history of diabetes mellitus (DM)    Handoff    MEWS Score    Anxiety    Anxiety    Depression    No pertinent past medical history    Postoperative CSF leak    Postoperative CSF leak    Back pain    Exploration, wound, lumbar region, with repair of dural defect if indicated, for CSF leak    S/P ACL repair    History of tonsillectomy    BACK SURGERY COMPLICATIONS    3    Headache    SysAdmin_VisitLink        POD # 8  S/P Wound exploration, repair of CSF leak and placement of Lumbar drain    Pt seen and examined at bedside with Dr Reyes.  Pt c/o mild incisional pain.  Pt had large BM yesterday after enema given.  Pt reports relief after BM.  Lumbar drain has been clamped since yesterday and pt reports he's had no headaches.  He's been ambulating around  BICU without difficulty.  Plan to keep LD clamped today and allow pt to walk around unit.  If he remains without headaches by early afternoon, 24hrs from when  LD was clamped, will d/c drain and pt can be Tx to floor.     Allergies    No Known Allergies    Intolerances        Vital Signs Last 24 Hrs  T(C): 36.4 (11 Oct 2020 08:11), Max: 37.7 (10 Oct 2020 23:00)  T(F): 97.5 (11 Oct 2020 08:11), Max: 99.9 (10 Oct 2020 23:00)  HR: 94 (11 Oct 2020 09:00) (86 - 105)  BP: 112/64 (11 Oct 2020 09:00) (109/85 - 136/72)  BP(mean): 82 (11 Oct 2020 09:00) (75 - 103)  RR: 17 (11 Oct 2020 09:00) (15 - 20)  SpO2: 98% (11 Oct 2020 09:00) (97% - 100%)      bisacodyl Suppository 10 milliGRAM(s) Rectal daily  ceFAZolin   IVPB 1000 milliGRAM(s) IV Intermittent every 8 hours  chlorhexidine 4% Liquid 1 Application(s) Topical <User Schedule>  cyclobenzaprine 10 milliGRAM(s) Oral <User Schedule> PRN  heparin   Injectable 7500 Unit(s) SubCutaneous every 8 hours  lactulose Syrup 10 Gram(s) Oral every 6 hours  magnesium hydroxide Suspension 30 milliLiter(s) Oral daily  melatonin 5 milliGRAM(s) Oral at bedtime  mineral oil 30 milliLiter(s) Oral daily  multivitamin 1 Tablet(s) Oral daily  pantoprazole    Tablet 40 milliGRAM(s) Oral before breakfast  polyethylene glycol 3350 17 Gram(s) Oral daily  senna 2 Tablet(s) Oral at bedtime        10-10-20 @ 07:01  -  10-11-20 @ 07:00  --------------------------------------------------------  IN: 0 mL / OUT: 1010 mL / NET: -1010 mL        REVIEW OF SYSTEMS    [x ] A ten-point review of systems was otherwise negative except as noted.  [ ] Due to altered mental status/intubation, subjective information were not able to be obtained from the patient. History was obtained, to the extent possible, from review of the chart and collateral sources of information.        Exam:  AAOX3. Verbal function intact  follows commands  Motor: MAEx4  5/5 power in b/l LE  Sensation: intact to LT b/l equal  Dressing clean, dry and intact, no drainage    CBC Full  -  ( 11 Oct 2020 00:18 )  WBC Count : 11.72 K/uL  RBC Count : 4.02 M/uL  Hemoglobin : 11.9 g/dL  Hematocrit : 36.5 %  Platelet Count - Automated : 284 K/uL  Mean Cell Volume : 90.8 fL  Mean Cell Hemoglobin : 29.6 pg  Mean Cell Hemoglobin Concentration : 32.6 g/dL  Auto Neutrophil # : x  Auto Lymphocyte # : x  Auto Monocyte # : x  Auto Eosinophil # : x  Auto Basophil # : x  Auto Neutrophil % : x  Auto Lymphocyte % : x  Auto Monocyte % : x  Auto Eosinophil % : x  Auto Basophil % : x    10-11    139  |  105  |  21<H>  ----------------------------<  116<H>  4.2   |  23  |  1.0    Ca    9.3      11 Oct 2020 00:18  Phos  4.6     10-11  Mg     2.0     10-11            Assessment/Plan:   keep lumbar drain clamped  ambulate pt around unit  monitor for headaches  d/c drain later today if no HA's or new neuro sxs  d/c Ancef once drain out  may be downgraded to floor once drain out  d/w attg

## 2020-10-11 NOTE — PROGRESS NOTE ADULT - SUBJECTIVE AND OBJECTIVE BOX
Patient name: Manohar Aguayo  Age 19 yrs    Indication for ICU admission:  Spinal drain    Admit Date:  10/2  ICU Date:      10/3  OR Date:      10/3    No Known Allergies    PAST MEDICAL & SURGICAL HISTORY:  Anxiety  S/P ACL repair  x2  History of tonsillectomy  Home Medications:       18 y/o male with recurrent CSF leak on POD #7  (had been repaired at time of initial surgery)  Now s/p primary repair of dura , application of duraseal and a fat plug  A spinal drain was placed for intermittent drainage of CSF      24 HOUR EVENTS:  DAY:  drain clamped since 9:30am, no headaches/nausea/vomiting  -large BM during the day    OVERNIGHT: no acute events      DVT PTX: heparin   Injectable 7500 Unit(s) SubCutaneous every 8 hours    GI PTX: pantoprazole    Tablet 40 milliGRAM(s) Oral before breakfast    ***Tubes/Lines/Drains  ***  Peripheral IV    [ x]A ten-point review of systems was otherwise negative except as noted above.  [ ]Due to altered mental status/intubation, subjective information was not attained from the patient.  History was obtained, to the extent possible, from review of the chart and collateral sources of information.      Patient name: Manohar Aguayo  Age 19 yrs    Indication for ICU admission:  Spinal drain    Admit Date:  10/2  ICU Date:      10/3  OR Date:      10/3    No Known Allergies    PAST MEDICAL & SURGICAL HISTORY:  Anxiety  S/P ACL repair  x2  History of tonsillectomy  Home Medications:       18 y/o male with recurrent CSF leak on POD #7  (had been repaired at time of initial surgery)  Now s/p primary repair of dura , application of duraseal and a fat plug  A spinal drain was placed for intermittent drainage of CSF      24 HOUR EVENTS:  DAY:  drain clamped since 9:30am, no headaches/nausea/vomiting  -large BM during the day    OVERNIGHT: no acute events      DVT PTX: heparin   Injectable 7500 Unit(s) SubCutaneous every 8 hours    GI PTX: pantoprazole    Tablet 40 milliGRAM(s) Oral before breakfast    ***Tubes/Lines/Drains  ***  Peripheral IV    [ x]A ten-point review of systems was otherwise negative except as noted above.  [ ]Due to altered mental status/intubation, subjective information was not attained from the patient.  History was obtained, to the extent possible, from review of the chart and collateral sources of information.     Daily     Daily Weight in k.9 (11 Oct 2020 05:00)    CURRENT MEDS:  Neurologic Medications  cyclobenzaprine 10 milliGRAM(s) Oral <User Schedule> PRN Muscle Spasm  melatonin 5 milliGRAM(s) Oral at bedtime    Respiratory Medications    Cardiovascular Medications    Gastrointestinal Medications  bisacodyl Suppository 10 milliGRAM(s) Rectal daily  lactulose Syrup 10 Gram(s) Oral every 6 hours  magnesium hydroxide Suspension 30 milliLiter(s) Oral daily  mineral oil 30 milliLiter(s) Oral daily  multivitamin 1 Tablet(s) Oral daily  pantoprazole    Tablet 40 milliGRAM(s) Oral before breakfast  polyethylene glycol 3350 17 Gram(s) Oral daily  senna 2 Tablet(s) Oral at bedtime    Genitourinary Medications    Hematologic/Oncologic Medications  heparin   Injectable 7500 Unit(s) SubCutaneous every 8 hours    Antimicrobial/Immunologic Medications  ceFAZolin   IVPB 1000 milliGRAM(s) IV Intermittent every 8 hours    Endocrine/Metabolic Medications    Topical/Other Medications  chlorhexidine 4% Liquid 1 Application(s) Topical <User Schedule>      ICU Vital Signs Last 24 Hrs  T(C): 36.1 (11 Oct 2020 01:00), Max: 37.7 (10 Oct 2020 23:00)  T(F): 97 (11 Oct 2020 01:00), Max: 99.9 (10 Oct 2020 23:00)  HR: 87 (11 Oct 2020 06:00) (86 - 105)  BP: 114/61 (11 Oct 2020 06:00) (111/65 - 136/72)  BP(mean): 78 (11 Oct 2020 06:00) (75 - 103)  ABP: --  ABP(mean): --  RR: 16 (11 Oct 2020 06:00) (16 - 20)  SpO2: 99% (11 Oct 2020 06:00) (97% - 100%)      I&O's Detail    10 Oct 2020 07:01  -  11 Oct 2020 07:00  --------------------------------------------------------  IN:  Total IN: 0 mL    OUT:    External Ventricular Device (mL): 10 mL    Voided (mL): 1000 mL  Total OUT: 1010 mL    Total NET: -1010 mL        I&O's Summary    10 Oct 2020 07:01  -  11 Oct 2020 07:00  --------------------------------------------------------  IN: 0 mL / OUT: 1010 mL / NET: -1010 mL      LABS:                        11.9   11.72 )-----------( 284      ( 11 Oct 2020 00:18 )             36.5         10-11    139  |  105  |  21<H>  ----------------------------<  116<H>  4.2   |  23  |  1.0      Calcium, Total Serum: 9.3 mg/dL (10-11- @ 00:18)        PHYSICAL EXAM:    General/Neuro   GCS:  15      Deficits:  alert & oriented x 3, no focal deficits  Lumbar drain clamped  Lungs:  clear to auscultation, Normal expansion/effort.   Cardiovascular : S1, S2.  Regular rate and rhythm.    Cardiac Rhythm: Normal Sinus Rhythm  GI: Abdomen soft, Non-tender, Non-distended.    Extremities: Extremities warm, well-perfused. No Peripheral edema LE b/l.   Derm: Good skin turgor, no skin breakdown.    : No Shirley catheter.

## 2020-10-11 NOTE — CHART NOTE - NSCHARTNOTEFT_GEN_A_CORE
Lumbar drain removed slowly without incident. 3-0 Vicryl suture placed in figure 8 fashion. No new drainage noted after suture placed. Dressing and Tegaderm placed over top of suture x 2. Pain medication given to alleviate pain    Of note, patient complained of RIGHT flank pain radiating into the RIGHT testicle. Tender to palpation over RIGHT psoas and flank region. Testicles palpated without tenderness, no erythema, edema or discoloration noted on inspection.    Attending notified, with following recommendations      - DVT sonogram ordered to rule out deferred DVT    - Testicular sonogram ordered to rule out testicular pathology    - Patient to remain in BICU overnight    - Will continue to follow    Above plan per attending Dr Reyes

## 2020-10-11 NOTE — PROGRESS NOTE ADULT - ASSESSMENT
Assesssment and Plan  20 y/o male with recurrent CSF leak on POD #14 (had been repaired at time of initial surgery), now POD#6 s/p primary repair of dura , application of duraseal and a fat plug with intermittent drainage of CSF    Neuro-  Pain control Tylenol standing,  D/c'd Oxy  Muscle spasm- Valium 5mg prn and Robaxin  Sensory and motor function intact  Lumbar drain clamped since 9:30am, moniot for HAs, so far ok during the dayto be   - continue q1 neurochecks  Management of drain as per NS    Pulm-  No chronic diagnosis  Currently on RA, saturating 93-95%  Encourage IS use    Cardiology-  No chronic diagnosis  Cardiac monitoring    GI-  Contiue regular diet as tolerated  Protonix PPX  Avoid constipation. Senna, miralax, increased lactulose to 10g q6h, mineral oil.  bisacodyl 5 milliGRAM(s) Oral at bedtime  polyethylene glycol 3350 17 Gram(s) Oral daily  senna 2 Tablet(s) Oral at bedtime   -large BM during the day 10/10    Renal  MAGNUS 2/2 acetozolamide resolved with hydration  BUN/Cr 25/0.9   IVL  Voiding adequately   Na 138/ K 4.1/ Mg 1.8 - repleted/ Phos 3.8    ID  Mild leukocytosis uptrending, 11.38>13.68>15.80>13.93  Ancef until drain is discontinued  No signs of infection    Heme  H&H at baseline 13.1  DVT PPX: heparin subq   DVT sono 10/7: NEG     MSK-  MUÑOZ, motor strength 5/5 of BUE and 5/5 BLE  Activity: ambulate with assistance. PT evaluation    Endo  No chronic diagnosis  FS ac and qhs with coverage as needed    Dispo: SICU with neurosurg management Assesssment and Plan  18 y/o male with recurrent CSF leak on POD #14 (had been repaired at time of initial surgery), now POD#6 s/p primary repair of dura , application of duraseal and a fat plug with intermittent drainage of CSF    Neuro-  Pain control Tylenol standing,  D/c'd Oxy  Muscle spasm- Valium 5mg prn and Robaxin  Sensory and motor function intact  Lumbar drain clamped since 9:30am 10/10, monitor for HAs, so far no complaints   - continue q1 neurochecks  Management of drain as per NS    Pulm-  No chronic diagnosis  Currently on RA, saturating 93-95%  Encourage IS use    Cardiology-  No chronic diagnosis  Cardiac monitoring    GI-  Contiue regular diet as tolerated  Protonix PPX  Avoid constipation. Senna, miralax, increased lactulose to 10g q6h, mineral oil.  bisacodyl 5 milliGRAM(s) Oral at bedtime  polyethylene glycol 3350 17 Gram(s) Oral daily  senna 2 Tablet(s) Oral at bedtime   -large BM during the day 10/10    Renal  MAGNUS 2/2 acetozolamide resolved with hydration  BUN/Cr 21/1.0   IVL  Voiding adequately   Na 139/ K 4.2/ Mg 2/ Phos 4.6    ID  Mild leukocytosis downtrending, 11.38>13.68>15.80>13.93-->11.7  Ancef until drain is discontinued  No signs of infection    Heme  H&H 11.9  DVT PPX: heparin subq   DVT sono 10/7: NEG     MSK-  MUÑOZ, motor strength 5/5 of BUE and 5/5 BLE  Activity: ambulate with assistance few steps. PT evaluation    Endo  No chronic diagnosis  FS ac and qhs with coverage as needed    Dispo: SICU with neurosurg management

## 2020-10-12 ENCOUNTER — TRANSCRIPTION ENCOUNTER (OUTPATIENT)
Age: 20
End: 2020-10-12

## 2020-10-12 VITALS — DIASTOLIC BLOOD PRESSURE: 61 MMHG | OXYGEN SATURATION: 100 % | SYSTOLIC BLOOD PRESSURE: 140 MMHG | HEART RATE: 78 BPM

## 2020-10-12 LAB
ANION GAP SERPL CALC-SCNC: 11 MMOL/L — SIGNIFICANT CHANGE UP (ref 7–14)
BUN SERPL-MCNC: 21 MG/DL — HIGH (ref 10–20)
CALCIUM SERPL-MCNC: 9 MG/DL — SIGNIFICANT CHANGE UP (ref 8.5–10.1)
CHLORIDE SERPL-SCNC: 105 MMOL/L — SIGNIFICANT CHANGE UP (ref 98–110)
CO2 SERPL-SCNC: 24 MMOL/L — SIGNIFICANT CHANGE UP (ref 17–32)
CREAT SERPL-MCNC: 0.8 MG/DL — SIGNIFICANT CHANGE UP (ref 0.3–1)
GLUCOSE SERPL-MCNC: 123 MG/DL — HIGH (ref 70–99)
HCT VFR BLD CALC: 37.7 % — LOW (ref 42–52)
HGB BLD-MCNC: 12.3 G/DL — LOW (ref 14–18)
MAGNESIUM SERPL-MCNC: 2 MG/DL — SIGNIFICANT CHANGE UP (ref 1.8–2.4)
MCHC RBC-ENTMCNC: 29.8 PG — SIGNIFICANT CHANGE UP (ref 27–31)
MCHC RBC-ENTMCNC: 32.6 G/DL — SIGNIFICANT CHANGE UP (ref 32–37)
MCV RBC AUTO: 91.3 FL — SIGNIFICANT CHANGE UP (ref 80–94)
NRBC # BLD: 0 /100 WBCS — SIGNIFICANT CHANGE UP (ref 0–0)
PHOSPHATE SERPL-MCNC: 5.4 MG/DL — HIGH (ref 2.1–4.9)
PLATELET # BLD AUTO: 276 K/UL — SIGNIFICANT CHANGE UP (ref 130–400)
POTASSIUM SERPL-MCNC: 4.1 MMOL/L — SIGNIFICANT CHANGE UP (ref 3.5–5)
POTASSIUM SERPL-SCNC: 4.1 MMOL/L — SIGNIFICANT CHANGE UP (ref 3.5–5)
RBC # BLD: 4.13 M/UL — LOW (ref 4.7–6.1)
RBC # FLD: 13.1 % — SIGNIFICANT CHANGE UP (ref 11.5–14.5)
SODIUM SERPL-SCNC: 140 MMOL/L — SIGNIFICANT CHANGE UP (ref 135–146)
WBC # BLD: 11.59 K/UL — HIGH (ref 4.8–10.8)
WBC # FLD AUTO: 11.59 K/UL — HIGH (ref 4.8–10.8)

## 2020-10-12 PROCEDURE — 99291 CRITICAL CARE FIRST HOUR: CPT

## 2020-10-12 RX ORDER — ACETAMINOPHEN 500 MG
2 TABLET ORAL
Qty: 0 | Refills: 0 | DISCHARGE
Start: 2020-10-12

## 2020-10-12 RX ORDER — ACETAMINOPHEN 500 MG
650 TABLET ORAL ONCE
Refills: 0 | Status: COMPLETED | OUTPATIENT
Start: 2020-10-12 | End: 2020-10-12

## 2020-10-12 RX ORDER — ACETAMINOPHEN 500 MG
650 TABLET ORAL EVERY 6 HOURS
Refills: 0 | Status: DISCONTINUED | OUTPATIENT
Start: 2020-10-12 | End: 2020-10-12

## 2020-10-12 RX ADMIN — Medication 30 MILLILITER(S): at 12:21

## 2020-10-12 RX ADMIN — Medication 1 TABLET(S): at 12:21

## 2020-10-12 RX ADMIN — HEPARIN SODIUM 7500 UNIT(S): 5000 INJECTION INTRAVENOUS; SUBCUTANEOUS at 05:55

## 2020-10-12 RX ADMIN — CHLORHEXIDINE GLUCONATE 1 APPLICATION(S): 213 SOLUTION TOPICAL at 05:55

## 2020-10-12 RX ADMIN — CYCLOBENZAPRINE HYDROCHLORIDE 10 MILLIGRAM(S): 10 TABLET, FILM COATED ORAL at 05:30

## 2020-10-12 RX ADMIN — HEPARIN SODIUM 7500 UNIT(S): 5000 INJECTION INTRAVENOUS; SUBCUTANEOUS at 13:07

## 2020-10-12 RX ADMIN — Medication 650 MILLIGRAM(S): at 05:27

## 2020-10-12 RX ADMIN — Medication 650 MILLIGRAM(S): at 04:27

## 2020-10-12 RX ADMIN — LACTULOSE 10 GRAM(S): 10 SOLUTION ORAL at 12:22

## 2020-10-12 RX ADMIN — Medication 650 MILLIGRAM(S): at 12:20

## 2020-10-12 RX ADMIN — MAGNESIUM HYDROXIDE 30 MILLILITER(S): 400 TABLET, CHEWABLE ORAL at 12:21

## 2020-10-12 RX ADMIN — PANTOPRAZOLE SODIUM 40 MILLIGRAM(S): 20 TABLET, DELAYED RELEASE ORAL at 07:24

## 2020-10-12 RX ADMIN — POLYETHYLENE GLYCOL 3350 17 GRAM(S): 17 POWDER, FOR SOLUTION ORAL at 13:02

## 2020-10-12 RX ADMIN — Medication 650 MILLIGRAM(S): at 13:20

## 2020-10-12 NOTE — DISCHARGE NOTE PROVIDER - NSDCACTIVITY_GEN_ALL_CORE
Showering allowed/Walking - Outdoors allowed/Walking - Indoors allowed/No heavy lifting/straining/Do not make important decisions/Do not drive or operate machinery/Stairs allowed

## 2020-10-12 NOTE — PROGRESS NOTE ADULT - ASSESSMENT
Assesssment and Plan  18 y/o male with recurrent CSF leak on POD #14 (had been repaired at time of initial surgery), now POD#6 s/p primary repair of dura , application of duraseal and a fat plug with intermittent drainage of CSF    Neuro-  Pain control Tylenol standing,  D/c'd Oxy  Muscle spasm- Valium 5mg prn and Robaxin  Sensory and motor function intact  -ambulated with drain clamped, small amount of leakage, but no c/o headache.  Lumbar drain removed 10/11  -changed to q2 neurochecks    Pulm-  No chronic diagnosis  Currently on RA, saturating 93-95%  Encourage IS use    Cardiology-  No chronic diagnosis  Cardiac monitoring    GI-  Contiue regular diet as tolerated  Protonix PPX  Avoid constipation. Senna, miralax, increased lactulose to 10g q6h, mineral oil.  bisacodyl 5 milliGRAM(s) Oral at bedtime  polyethylene glycol 3350 17 Gram(s) Oral daily  senna 2 Tablet(s) Oral at bedtime   -large BM during the day 10/10    Renal  MAGNUS 2/2 acetozolamide resolved with hydration  BUN/Cr 25/0.9   IVL  Voiding adequately   Na 138/ K 4.2/ Mg 2/ Phos 4.6  acute R testicular pain, pending US - consider urology    ID  Mild leukocytosis uptrending, 11.38>13.68>15.80>13.93->11.7  Ancef d/c'd 10/11  No signs of infection    Heme  H&H 11.9  DVT PPX: heparin subq   DVT sono 10/7: NEG   rpt DVT sono per Eric r/o DVT    MSK-  MUÑOZ, motor strength 5/5 of BUE and 5/5 BLE  Activity: ambulate with assistance. PT evaluation    Endo  No chronic diagnosis  FS ac and qhs with coverage as needed    Dispo: d/g vs discharge      Assesssment and Plan  18 y/o male with recurrent CSF leak after L5-S1 TLIF on 9/25/20 (had been repaired at time of initial surgery), now POD#9 s/p primary repair of dura , application of duraseal and a fat plug with intermittent drainage of CSF.  The drain was clamped and removed yesterday at 6pm. He was able to ambulate with a small amount of leakage, but no c/o headache.     Pt c/o right testicular pain yesterday as well as ON, US ordered, but patient refused, may be referred pain. No acute events ON.     Neuro-  Pain control Tylenol PRN  Muscle spasm---Valium 5mg prn fell off (kept off), Robaxin  Sensory and motor function intact  -ambulated with drain clamped, small amount of leakage, but no c/o headache.  Lumbar drain removed 10/11  -changed to q2 neurochecks    Pulm-  No chronic diagnosis  Currently on RA, saturating 93-95%  Encourage IS use    Cardiology-  No chronic diagnosis  Cardiac monitoring    GI-  Contiue regular diet as tolerated  Protonix PPX  Avoid constipation. Senna, miralax, increased lactulose to 10g q6h, mineral oil.  bisacodyl 5 milliGRAM(s) Oral at bedtime  polyethylene glycol 3350 17 Gram(s) Oral daily  senna 2 Tablet(s) Oral at bedtime   -large BM during the day 10/10    Renal  MAGNUS 2/2 acetozolamide resolved with hydration  BUN/Cr 25/0.9 > 21/0.8  IVL  Voiding adequately   Lytes-Na 140 // K 4.1 // Phos 5.4 //  Mag 2.0  acute R testicular pain, patient refused US, per urology may be referred pain    ID  Mild leukocytosis uptrending, 11.38>13.68>15.80>13.93->11.7 > 11.59  Ancef d/c'd 10/11  No signs of infection    Heme  H&H 11.9 > 12.3  DVT PPX: heparin subq   DVT sono 10/7: NEG   rpt DVT sono per Eric r/o DVT read pending     MSK-  MUÑOZ, motor strength 5/5 of BUE and 5/5 BLE  Activity: ambulate with assistance. PT evaluation    Endo  No chronic diagnosis  FS ac and qhs with coverage as needed  Last blood glucose 123    Dispo: d/g vs discharge

## 2020-10-12 NOTE — PROGRESS NOTE ADULT - SUBJECTIVE AND OBJECTIVE BOX
Subjective: 19yMale with a pmhx of BACK PAIN, HEADACHE    ^BACK PAIN, HEADACHE    Family history of diabetes mellitus (DM)    Handoff    MEWS Score    Anxiety    Anxiety    Depression    No pertinent past medical history    Postoperative CSF leak    Postoperative CSF leak    Back pain    Exploration, wound, lumbar region, with repair of dural defect if indicated, for CSF leak    S/P ACL repair    History of tonsillectomy    BACK SURGERY COMPLICATIONS    3    Headache    [email or web address]      POD # 9  S/P Wound exploration, repair of CSF leak and placement of Lumbar drain    Pt seen and examined at bedside.  Pt c/o mild incisional pain.  Pt had large BM 10.10.  He's been ambulating around  BICU without difficulty and no headaces.      Allergies    No Known Allergies    Intolerances        Vital Signs Last 24 Hrs  T(C): 36.4 (12 Oct 2020 07:53), Max: 37.3 (11 Oct 2020 15:10)  T(F): 97.6 (12 Oct 2020 07:53), Max: 99.1 (11 Oct 2020 15:10)  HR: 83 (12 Oct 2020 09:00) (66 - 116)  BP: 125/70 (12 Oct 2020 09:00) (106/51 - 147/80)  BP(mean): 91 (12 Oct 2020 09:00) (73 - 104)  RR: 16 (12 Oct 2020 09:00) (12 - 20)  SpO2: 98% (12 Oct 2020 09:00) (97% - 100%)      acetaminophen   Tablet .. 650 milliGRAM(s) Oral every 6 hours PRN  bisacodyl Suppository 10 milliGRAM(s) Rectal daily  chlorhexidine 4% Liquid 1 Application(s) Topical <User Schedule>  cyclobenzaprine 10 milliGRAM(s) Oral <User Schedule> PRN  heparin   Injectable 7500 Unit(s) SubCutaneous every 8 hours  lactulose Syrup 10 Gram(s) Oral every 6 hours  magnesium hydroxide Suspension 30 milliLiter(s) Oral daily  melatonin 5 milliGRAM(s) Oral at bedtime  mineral oil 30 milliLiter(s) Oral daily  multivitamin 1 Tablet(s) Oral daily  pantoprazole    Tablet 40 milliGRAM(s) Oral before breakfast  polyethylene glycol 3350 17 Gram(s) Oral daily  senna 2 Tablet(s) Oral at bedtime        10-11-20 @ 07:01  -  10-12-20 @ 07:00  --------------------------------------------------------  IN: 150 mL / OUT: 1550 mL / NET: -1400 mL        REVIEW OF SYSTEMS    [ X ] A ten-point review of systems was otherwise negative except as noted.  [ ] Due to altered mental status/intubation, subjective information were not able to be obtained from the patient. History was obtained, to the extent possible, from review of the chart and collateral sources of information.      Exam:  AAOX3. Verbal function intact  follows commands  Motor: MAEx4  5/5 power in b/l LE  Sensation: intact to LT b/l equal  Dressing clean, dry and intact, no drainage        CBC Full  -  ( 12 Oct 2020 00:13 )  WBC Count : 11.59 K/uL  RBC Count : 4.13 M/uL  Hemoglobin : 12.3 g/dL  Hematocrit : 37.7 %  Platelet Count - Automated : 276 K/uL  Mean Cell Volume : 91.3 fL  Mean Cell Hemoglobin : 29.8 pg  Mean Cell Hemoglobin Concentration : 32.6 g/dL      10-12    140  |  105  |  21<H>  ----------------------------<  123<H>  4.1   |  24  |  0.8    Ca    9.0      12 Oct 2020 00:13  Phos  5.4     10-12  Mg     2.0     10-12      Imaging:  < from: [facility] Duplex Lower Ext Vein Scan, Arnoldo (10.11.20 @ 21:03) >    IMPRESSION:  No evidence of deep venous thrombosis in either lower extremity.    < end of copied text >    Assessment/Plan:   s/p repair of CSF leak with placement of lumbar drain  pt has no headaches, Lumbar drain has been removed from yesterday, no drainage  ambulate pt around unit, do stair training  monitor for headaches  dvt/sono negative for DVT  will work on d/c home after stairs training  d/w attg

## 2020-10-12 NOTE — DISCHARGE NOTE PROVIDER - CARE PROVIDER_API CALL
Candice Reyes  [facility]  [address] [place] [address]  Phone: [phone]  Fax: [phone]  Follow Up Time:

## 2020-10-12 NOTE — PROGRESS NOTE ADULT - SUBJECTIVE AND OBJECTIVE BOX
Patient name: Manohar Aguayo  Age 19 yrs    Indication for ICU admission:  Spinal drain    Admit Date:  10/2  ICU Date:      10/3  OR Date:      10/3    No Known Allergies    PAST MEDICAL & SURGICAL HISTORY:  Anxiety  S/P ACL repair  x2  History of tonsillectomy  Home Medications:       18 y/o male with recurrent CSF leak on POD #7  (had been repaired at time of initial surgery)  Now s/p primary repair of dura , application of duraseal and a fat plug  A spinal drain was placed for intermittant drainage of CSF    24 HOUR EVENTS:  DAY:  -ambulate with drain clamped, small amount of leakage, but no c/o headache   -spinal drain removed, Ancef d/c'd  -pt c/o right testicular pain now, US ordered     Ov: c/o of testicular pain, otherwise no acute events     DVT PTX: heparin   Injectable 7500 Unit(s) SubCutaneous every 8 hours    GI PTX: pantoprazole    Tablet 40 milliGRAM(s) Oral before breakfast    ***Tubes/Lines/Drains  ***  Peripheral IV    [ x]A ten-point review of systems was otherwise negative except as noted above.  [ ]Due to altered mental status/intubation, subjective information was not attained from the patient.  History was obtained, to the extent possible, from review of the chart and collateral sources of information.

## 2020-10-12 NOTE — DISCHARGE NOTE PROVIDER - NSDCCPCAREPLAN_GEN_ALL_CORE_FT
PRINCIPAL DISCHARGE DIAGNOSIS  Diagnosis: Back pain  Assessment and Plan of Treatment:       SECONDARY DISCHARGE DIAGNOSES  Diagnosis: Headache  Assessment and Plan of Treatment:

## 2020-10-12 NOTE — DISCHARGE NOTE PROVIDER - NSDCMRMEDTOKEN_GEN_ALL_CORE_FT
acetaminophen 325 mg oral tablet: 2 tab(s) orally every 6 hours, As needed, Mild Pain (1 - 3)  Multiple Vitamins oral tablet: 1 tab(s) orally once a day  oxycodone-acetaminophen 5 mg-325 mg oral tablet: 2 tab(s) orally every 6 hours, As Needed -Moderate Pain (4 - 6) MDD:8  pantoprazole 40 mg oral delayed release tablet: 1 tab(s) orally once a day (before a meal)  Robaxin-750 oral tablet: 1 tab(s) orally every 6 hours, As Needed -for muscle spasm MDD:4  senna oral tablet: 2 tab(s) orally once a day (at bedtime), As Needed -Constipation - for congestion - for constipation

## 2020-10-12 NOTE — DISCHARGE NOTE PROVIDER - NSDCFUADDINST_GEN_ALL_CORE_FT
May remove outer dressing in 3 days. Keep clean and dry. do not scrub incision. May shower then pat dry

## 2020-10-12 NOTE — PROGRESS NOTE ADULT - REASON FOR ADMISSION
headache

## 2020-10-12 NOTE — CHART NOTE - NSCHARTNOTEFT_GEN_A_CORE
18 y/o male POD 17 s/p L5-S1 TLIF with repaired dural leak with Dr. Reyes c/b post up CSF leak and is currently POD 9 s/p repair of dural defect and placement of lumbar drain upgraded to SICU for close neurological monitoring. Lumbar drained removed on 10/11 patient has no neurological deficits, not complaining of headaches, 18 y/o male POD 17 s/p L5-S1 TLIF with repaired dural leak with Dr. Reyes c/b post up CSF leak and is currently POD 9 s/p repair of dural defect and placement of lumbar drain upgraded to SICU for close neurological monitoring. Lumbar drained removed on 10/11 patient has no neurological deficits, not complaining of headaches, HDS , no longer meets criteria for ICU admission and is stable for downgrade.    PAST MEDICAL & SURGICAL HISTORY:  Anxiety  S/P ACL repair  x2  History of tonsillectomy  Home Medications:     Neurologic:  CSF leak: post op see above  - Neuro checks q4   -     Respiratory:    Cardiovascular:    Gastrointestinal/Nutrition:    Genitourinary/Renal:    Hematologic:    Infectious Disease:    Endocrine:    Disposition:    Neuro-  Pain control Tylenol PRN  Muscle spasm---Valium 5mg prn fell off (kept off), Robaxin  Sensory and motor function intact  -ambulated with drain clamped, small amount of leakage, but no c/o headache.  Lumbar drain removed 10/11  -changed to q2 neurochecks    Pulm-  No chronic diagnosis  Currently on RA, saturating 93-95%  Encourage IS use    Cardiology-  No chronic diagnosis  Cardiac monitoring    GI-  Contiue regular diet as tolerated  Protonix PPX  Avoid constipation. Senna, miralax, increased lactulose to 10g q6h, mineral oil.  bisacodyl 5 milliGRAM(s) Oral at bedtime  polyethylene glycol 3350 17 Gram(s) Oral daily  senna 2 Tablet(s) Oral at bedtime   -large BM during the day 10/10    Renal  MAGNUS 2/2 acetozolamide resolved with hydration  BUN/Cr 25/0.9 > 21/0.8  IVL  Voiding adequately   Lytes-Na 140 // K 4.1 // Phos 5.4 //  Mag 2.0  acute R testicular pain, patient refused US, per urology may be referred pain    ID  Mild leukocytosis uptrending, 11.38>13.68>15.80>13.93->11.7 > 11.59  Ancef d/c'd 10/11  No signs of infection    Heme  H&H 11.9 > 12.3  DVT PPX: heparin subq   DVT sono 10/7: NEG   rpt DVT sono per Eric r/o DVT read pending     MSK-  MUÑOZ, motor strength 5/5 of BUE and 5/5 BLE  Activity: ambulate with assistance. PT evaluation    Endo  No chronic diagnosis  FS ac and qhs with coverage as needed  Last blood glucose 123    Dispo: d/g vs discharge 18 y/o male POD 17 s/p L5-S1 TLIF with repaired dural leak with Dr. Reyes c/b post up CSF leak and is currently POD 9 s/p repair of dural defect and placement of lumbar drain upgraded to SICU for close neurological monitoring. Lumbar drained removed on 10/11 patient has no neurological deficits, not complaining of headaches, HDS , no longer meets criteria for ICU admission and is stable for downgrade.    PAST MEDICAL & SURGICAL HISTORY:  Anxiety  S/P ACL repair  x2  History of tonsillectomy  Home Medications:     Neurologic:  CSF leak: s/p repair of CSF leak with placement of lumbar drain  - Neuro checks q4   - Pain control tylenol prn     Muscle spasm:  - Robaxin     Respiratory:  - No acute issues  - RA as tolerated   - IS/PT /OOB    Cardiovascular:  - No chronic issues  - Maintain MAP >65    Gastrointestinal/Nutrition:  Diet:  - Regular diet  - PPI    Constipation:   - Senna, Miralax, lactulose, mineral oil, senna  - Last BM 10/10      Genitourinary/Renal:  R. Testicular pain  - Refusing ultrasound   - Per urology, likely ilioinguinal pain referred to testicles  - Voiding appropriately  - Trend lytes and replete    Hematologic:  - H/H stable  - DVT prophylaxis  - DVT sono negative     Infectious Disease:  - Slight leukocytosis, afebrile, monitor   - Currently no indication for abx     Endocrine:  - No acute issues  - ISS as needed to maintain glucose 140-180     Disposition: D/g             Dispo: d/g vs discharge

## 2020-10-12 NOTE — DISCHARGE NOTE PROVIDER - NSDCCPTREATMENT_GEN_ALL_CORE_FT
PRINCIPAL PROCEDURE  Procedure: Exploration, wound, lumbar region, with repair of dural defect if indicated, for CSF leak  Findings and Treatment: left L5-S1

## 2020-10-12 NOTE — PROGRESS NOTE ADULT - ATTENDING COMMENTS
Agree with above plan.
Agree with above plan.
Given no Headache, OK to mobilize OOB.     Keep Lumbar drain at 10 mL/hr.
Pt seen and examined.     Pt still without BM.     Lumbar drain clamped.  Continue to monitor for headaches.
The patient was seen and examined at bedside.  Patient's demonstrates stable neurological examination.  He denies any headaches.  Patient was mobilized to completely upright position, with leg hanging off side effect.  Patient tolerated this well.    at present time, patient was advised that he may be able to mobilize out of bed to commode as tolerated.    Maintain lumbar drainage at 10 mL/hour.    Patient tolerated this well today, the patient can slowly mobilized out of bed with physical therapy tomorrow, while maintaining lumbar drain in place.    Continue with DVT prophylaxis.
The patient was seen and examined.  Patient denies any headaches.  Patient has had a bowel movement.    At present time, plan is to able additional mobilization out of bed.  Patient has been clamped without any evidence of headache.  Therefore, we will plan to remove lumbar drain, with plans to transfer to floor.    Patient will remain in the hospital at least 24 hours after lumbar drain has been removed, in order to ensure, that he does not have a history of spinal leak.
The patient was seen and examined.  Patient denies any significant headache, reports that back pain is improved.  Neurological examination is stable.      Will decrease lumbar drain output 10 mL an hour.  We will also increase head of bed up to   90°.    Continue with pain control, and DVT prophylaxis.  Bowel meds.
The patient was seen examined at bedside.  Patient denies any significant headache.  Patient remains neurologically stable.  Plan is to advance head of bed up to 45°, and continue with lumbar drainage at 15 mL/hour.  Continue with DVT prophylaxis.
I examined the patient with the PA and discussed my plan with the resident.  I personally provided over 30 minutes of direct critical care to this patient. Agree with above note unless directly contradicted below.       18 y/o male with recurrent CSF leak on POD #7  (had been repaired at time of initial surgery)  s/p primary repair of dura , application of duraseal and a fat plug. Spinal drain removed this weekend.     No complaints this morning. Ambulating in unit. no pain or headache. Denies numbness/tingling in lower extremities. Cleared for downgrade by neurosurgery team.     No further SICU management needed.
CSF leak s/p repair   bed rest neuro checks   hourly CSF drainage   continue SICU Care
Patient seen and examined.  Patient denies any headache.  Patient sitting comfortably in chair.  Patient has been able to mobilize out of bed and stand, as well as ambulate around ICU [facility] without any evidence of headaches.  Lumbar drain has been draining approximately 10 cc an hour.    The present time, we'll decrease lumbar drainage down to 5 cc per hour.  Plan is to continue to monitor incision, and patient's symptoms.    There was evidence of a draining around the drain exit site.  Additional suture was placed at the drain exit site, which is resolved drainage from incision.    Patient has been advised to mobilize the fluid, and to avoid any straining.  Aggressive bowel regimen has been prescribed.  Continue with DVT prophylaxis.
continue lumbar drain   neuro checks  continue bowel regimen   continue SICU Care
epidural leak s/p land S spine fusion  have lumbar drain   continue SICU care till drain is out
no new issues   continue epidural drainage  as per NS
stable overnight   up and ambulating   positive BM   drain clamped , possible removal   possible transfer to floor.
I personally examined this patient with the PA and resident and discussed my plan with them.    pt on home valium  lumbar drain draining max 15/h  pain controlled with tylenol and oxy  hob max 15 unless feeding  che resolved after mild fluid resuscitation  f/u nsgy for drain plan
doing well   still with constipation   epidural drain still in place   further case as per NS
stable overnight   no BM   OOB and ambulating   Neuro checks   continue SICU Care

## 2020-10-12 NOTE — DISCHARGE NOTE PROVIDER - HOSPITAL COURSE
19 year old male with a h/o chronic sports-related vertebral degenerative changes, s/p L5-S1 TLIF with repaired dural leak with Dr. Reyes on 9/25/20, who was sent by his neurosurgeon to the ED for evaluation of progressively worsening positional diffuse headache, nausea with emesis, and back pain after being discharged 4 days ago. As per mother at the bedside, pt has been having headache escalating from 0-20 upon standing up, and has had 6-7 episodes of vomiting over the past 2 days. He also notes associated photophobia. Pt states symptoms are persistent and is unable to sleep due to pain.   He has been on Diazepam, Percocet, Robaxin, Acetazolamide, Senna, Protonix.  Medications were changed from Percocet to Fioricet for symptomatic control yesterday morning. Last pain medication taken was 11 PM last night, Diazepam.  Urinated this AM and able to defecate.  Otherwise, he denies vision changes, stool/urine incontinence or retention, weakness, focal deficits, or other sx and complaints including fever, chills, diarrhea, chest pain, dyspnea.  Denies saddle anesthesia, paresthesias.     Pt was admitted taken to the OR for repair of csf leak and placement of lumbar drain. Pt was admitted to icu and csf was drained hourly . He was kept on flat bedrest initially and was raised slowly over multiple days. He then was OOB and ambulating with the drain. The drain was clamped and he ambulated well without any drainage from the wound site. There was a large stitch placed on the lumbar drain site. Pt had mild testicular pain and ordered a sono of the testes. Pt refused the sono stating that he no longer has pain in the area. DVT/sono was ordered and it was negative. Pt feels better and states he wants to go home. He was cleared by social work and physical therapy for discharge. This was discussed with Dr. Reyes. Pt was instructed to call Dr. Reyes's office if he experiences headaches, nausea or vomitting or return to the ER.

## 2020-10-14 ENCOUNTER — INPATIENT (INPATIENT)
Facility: HOSPITAL | Age: 20
LOS: 5 days | Discharge: HOME HEALTH | End: 2020-10-20
Attending: NEUROLOGICAL SURGERY | Admitting: NEUROLOGICAL SURGERY
Payer: COMMERCIAL

## 2020-10-14 VITALS
RESPIRATION RATE: 18 BRPM | SYSTOLIC BLOOD PRESSURE: 117 MMHG | HEART RATE: 111 BPM | OXYGEN SATURATION: 98 % | DIASTOLIC BLOOD PRESSURE: 65 MMHG | TEMPERATURE: 98 F | WEIGHT: 315 LBS

## 2020-10-14 DIAGNOSIS — Z98.890 OTHER SPECIFIED POSTPROCEDURAL STATES: Chronic | ICD-10-CM

## 2020-10-14 DIAGNOSIS — A41.9 SEPSIS, UNSPECIFIED ORGANISM: ICD-10-CM

## 2020-10-14 DIAGNOSIS — G03.8 MENINGITIS DUE TO OTHER SPECIFIED CAUSES: ICD-10-CM

## 2020-10-14 DIAGNOSIS — Z98.1 ARTHRODESIS STATUS: Chronic | ICD-10-CM

## 2020-10-14 DIAGNOSIS — G97.82 OTHER POSTPROCEDURAL COMPLICATIONS AND DISORDERS OF NERVOUS SYSTEM: ICD-10-CM

## 2020-10-14 DIAGNOSIS — E66.01 MORBID (SEVERE) OBESITY DUE TO EXCESS CALORIES: ICD-10-CM

## 2020-10-14 DIAGNOSIS — R50.82 POSTPROCEDURAL FEVER: ICD-10-CM

## 2020-10-14 DIAGNOSIS — R00.0 TACHYCARDIA, UNSPECIFIED: ICD-10-CM

## 2020-10-14 DIAGNOSIS — Z90.89 ACQUIRED ABSENCE OF OTHER ORGANS: Chronic | ICD-10-CM

## 2020-10-14 LAB
ALBUMIN SERPL ELPH-MCNC: 4.1 G/DL — SIGNIFICANT CHANGE UP (ref 3.5–5.2)
ALP SERPL-CCNC: 62 U/L — SIGNIFICANT CHANGE UP (ref 30–115)
ALT FLD-CCNC: 30 U/L — SIGNIFICANT CHANGE UP (ref 13–38)
ANION GAP SERPL CALC-SCNC: 13 MMOL/L — SIGNIFICANT CHANGE UP (ref 7–14)
APPEARANCE UR: CLEAR — SIGNIFICANT CHANGE UP
AST SERPL-CCNC: 14 U/L — SIGNIFICANT CHANGE UP (ref 13–38)
BACTERIA # UR AUTO: NEGATIVE — SIGNIFICANT CHANGE UP
BASOPHILS # BLD AUTO: 0.03 K/UL — SIGNIFICANT CHANGE UP (ref 0–0.2)
BASOPHILS NFR BLD AUTO: 0.2 % — SIGNIFICANT CHANGE UP (ref 0–1)
BILIRUB SERPL-MCNC: 0.7 MG/DL — SIGNIFICANT CHANGE UP (ref 0.2–1.2)
BILIRUB UR-MCNC: NEGATIVE — SIGNIFICANT CHANGE UP
BUN SERPL-MCNC: 14 MG/DL — SIGNIFICANT CHANGE UP (ref 10–20)
CALCIUM SERPL-MCNC: 9.4 MG/DL — SIGNIFICANT CHANGE UP (ref 8.5–10.1)
CHLORIDE SERPL-SCNC: 102 MMOL/L — SIGNIFICANT CHANGE UP (ref 98–110)
CO2 SERPL-SCNC: 23 MMOL/L — SIGNIFICANT CHANGE UP (ref 17–32)
COLOR SPEC: YELLOW — SIGNIFICANT CHANGE UP
CREAT SERPL-MCNC: 0.9 MG/DL — SIGNIFICANT CHANGE UP (ref 0.3–1)
DIFF PNL FLD: NEGATIVE — SIGNIFICANT CHANGE UP
EOSINOPHIL # BLD AUTO: 0.02 K/UL — SIGNIFICANT CHANGE UP (ref 0–0.7)
EOSINOPHIL NFR BLD AUTO: 0.1 % — SIGNIFICANT CHANGE UP (ref 0–8)
EPI CELLS # UR: 1 /HPF — SIGNIFICANT CHANGE UP (ref 0–5)
GLUCOSE SERPL-MCNC: 114 MG/DL — HIGH (ref 70–99)
GLUCOSE UR QL: NEGATIVE — SIGNIFICANT CHANGE UP
HCT VFR BLD CALC: 39.4 % — LOW (ref 42–52)
HGB BLD-MCNC: 13.1 G/DL — LOW (ref 14–18)
HYALINE CASTS # UR AUTO: 0 /LPF — SIGNIFICANT CHANGE UP (ref 0–7)
IMM GRANULOCYTES NFR BLD AUTO: 0.4 % — HIGH (ref 0.1–0.3)
KETONES UR-MCNC: NEGATIVE — SIGNIFICANT CHANGE UP
LEUKOCYTE ESTERASE UR-ACNC: NEGATIVE — SIGNIFICANT CHANGE UP
LYMPHOCYTES # BLD AUTO: 12.4 % — LOW (ref 20.5–51.1)
LYMPHOCYTES # BLD AUTO: 2.23 K/UL — SIGNIFICANT CHANGE UP (ref 1.2–3.4)
MCHC RBC-ENTMCNC: 29.4 PG — SIGNIFICANT CHANGE UP (ref 27–31)
MCHC RBC-ENTMCNC: 33.2 G/DL — SIGNIFICANT CHANGE UP (ref 32–37)
MCV RBC AUTO: 88.5 FL — SIGNIFICANT CHANGE UP (ref 80–94)
MONOCYTES # BLD AUTO: 2.06 K/UL — HIGH (ref 0.1–0.6)
MONOCYTES NFR BLD AUTO: 11.4 % — HIGH (ref 1.7–9.3)
NEUTROPHILS # BLD AUTO: 13.62 K/UL — HIGH (ref 1.4–6.5)
NEUTROPHILS NFR BLD AUTO: 75.5 % — HIGH (ref 42.2–75.2)
NITRITE UR-MCNC: NEGATIVE — SIGNIFICANT CHANGE UP
NRBC # BLD: 0 /100 WBCS — SIGNIFICANT CHANGE UP (ref 0–0)
PH UR: 6.5 — SIGNIFICANT CHANGE UP (ref 5–8)
PLATELET # BLD AUTO: 275 K/UL — SIGNIFICANT CHANGE UP (ref 130–400)
POTASSIUM SERPL-MCNC: 4.2 MMOL/L — SIGNIFICANT CHANGE UP (ref 3.5–5)
POTASSIUM SERPL-SCNC: 4.2 MMOL/L — SIGNIFICANT CHANGE UP (ref 3.5–5)
PROT SERPL-MCNC: 6.9 G/DL — SIGNIFICANT CHANGE UP (ref 6.1–8)
PROT UR-MCNC: ABNORMAL
RAPID RVP RESULT: SIGNIFICANT CHANGE UP
RBC # BLD: 4.45 M/UL — LOW (ref 4.7–6.1)
RBC # FLD: 13.2 % — SIGNIFICANT CHANGE UP (ref 11.5–14.5)
RBC CASTS # UR COMP ASSIST: 2 /HPF — SIGNIFICANT CHANGE UP (ref 0–4)
SARS-COV-2 RNA SPEC QL NAA+PROBE: SIGNIFICANT CHANGE UP
SODIUM SERPL-SCNC: 138 MMOL/L — SIGNIFICANT CHANGE UP (ref 135–146)
SP GR SPEC: 1.03 — SIGNIFICANT CHANGE UP (ref 1.01–1.03)
UROBILINOGEN FLD QL: SIGNIFICANT CHANGE UP
WBC # BLD: 18.04 K/UL — HIGH (ref 4.8–10.8)
WBC # FLD AUTO: 18.04 K/UL — HIGH (ref 4.8–10.8)
WBC UR QL: 1 /HPF — SIGNIFICANT CHANGE UP (ref 0–5)

## 2020-10-14 PROCEDURE — 71045 X-RAY EXAM CHEST 1 VIEW: CPT | Mod: 26

## 2020-10-14 PROCEDURE — 99285 EMERGENCY DEPT VISIT HI MDM: CPT

## 2020-10-14 PROCEDURE — 93970 EXTREMITY STUDY: CPT | Mod: 26

## 2020-10-14 RX ORDER — OXYCODONE AND ACETAMINOPHEN 5; 325 MG/1; MG/1
2 TABLET ORAL EVERY 6 HOURS
Refills: 0 | Status: DISCONTINUED | OUTPATIENT
Start: 2020-10-14 | End: 2020-10-20

## 2020-10-14 RX ORDER — SENNA PLUS 8.6 MG/1
2 TABLET ORAL AT BEDTIME
Refills: 0 | Status: DISCONTINUED | OUTPATIENT
Start: 2020-10-14 | End: 2020-10-20

## 2020-10-14 RX ORDER — ONDANSETRON 8 MG/1
4 TABLET, FILM COATED ORAL ONCE
Refills: 0 | Status: COMPLETED | OUTPATIENT
Start: 2020-10-14 | End: 2020-10-14

## 2020-10-14 RX ORDER — OXYCODONE AND ACETAMINOPHEN 5; 325 MG/1; MG/1
1 TABLET ORAL EVERY 6 HOURS
Refills: 0 | Status: DISCONTINUED | OUTPATIENT
Start: 2020-10-14 | End: 2020-10-20

## 2020-10-14 RX ORDER — METHOCARBAMOL 500 MG/1
750 TABLET, FILM COATED ORAL EVERY 6 HOURS
Refills: 0 | Status: DISCONTINUED | OUTPATIENT
Start: 2020-10-14 | End: 2020-10-20

## 2020-10-14 RX ORDER — SODIUM CHLORIDE 9 MG/ML
1000 INJECTION, SOLUTION INTRAVENOUS
Refills: 0 | Status: DISCONTINUED | OUTPATIENT
Start: 2020-10-14 | End: 2020-10-20

## 2020-10-14 RX ORDER — ACETAMINOPHEN 500 MG
650 TABLET ORAL EVERY 6 HOURS
Refills: 0 | Status: DISCONTINUED | OUTPATIENT
Start: 2020-10-14 | End: 2020-10-20

## 2020-10-14 RX ORDER — ONDANSETRON 8 MG/1
4 TABLET, FILM COATED ORAL EVERY 6 HOURS
Refills: 0 | Status: DISCONTINUED | OUTPATIENT
Start: 2020-10-14 | End: 2020-10-20

## 2020-10-14 RX ORDER — DEXTROSE MONOHYDRATE, SODIUM CHLORIDE, AND POTASSIUM CHLORIDE 50; .745; 4.5 G/1000ML; G/1000ML; G/1000ML
1000 INJECTION, SOLUTION INTRAVENOUS
Refills: 0 | Status: DISCONTINUED | OUTPATIENT
Start: 2020-10-14 | End: 2020-10-15

## 2020-10-14 RX ORDER — SODIUM CHLORIDE 9 MG/ML
1000 INJECTION INTRAMUSCULAR; INTRAVENOUS; SUBCUTANEOUS ONCE
Refills: 0 | Status: COMPLETED | OUTPATIENT
Start: 2020-10-14 | End: 2020-10-14

## 2020-10-14 RX ORDER — PANTOPRAZOLE SODIUM 20 MG/1
40 TABLET, DELAYED RELEASE ORAL
Refills: 0 | Status: DISCONTINUED | OUTPATIENT
Start: 2020-10-14 | End: 2020-10-20

## 2020-10-14 RX ADMIN — SODIUM CHLORIDE 75 MILLILITER(S): 9 INJECTION, SOLUTION INTRAVENOUS at 16:53

## 2020-10-14 RX ADMIN — SODIUM CHLORIDE 1000 MILLILITER(S): 9 INJECTION INTRAMUSCULAR; INTRAVENOUS; SUBCUTANEOUS at 12:14

## 2020-10-14 RX ADMIN — Medication 650 MILLIGRAM(S): at 16:54

## 2020-10-14 RX ADMIN — DEXTROSE MONOHYDRATE, SODIUM CHLORIDE, AND POTASSIUM CHLORIDE 75 MILLILITER(S): 50; .745; 4.5 INJECTION, SOLUTION INTRAVENOUS at 16:54

## 2020-10-14 RX ADMIN — ONDANSETRON 4 MILLIGRAM(S): 8 TABLET, FILM COATED ORAL at 12:14

## 2020-10-14 NOTE — H&P ADULT - NSHPLABSRESULTS_GEN_ALL_CORE
Complete Blood Count + Automated Diff (10.14.20 @ 12:15)   WBC Count: 18.04 K/uL   RBC Count: 4.45 M/uL   Hemoglobin: 13.1 g/dL   Hematocrit: 39.4 %   Mean Cell Volume: 88.5 fL   Mean Cell Hemoglobin: 29.4 pg   Mean Cell Hemoglobin Conc: 33.2 g/dL   Red Cell Distrib Width: 13.2 %   Platelet Count - Automated: 275 K/uL   Auto Neutrophil #: 13.62 K/uL   Auto Lymphocyte #: 2.23 K/uL   Auto Monocyte #: 2.06 K/uL   Auto Eosinophil #: 0.02 K/uL   Auto Basophil #: 0.03 K/uL   Auto Neutrophil %: 75.5: Differential percentages must be correlated with absolute numbers for   clinical significance. %   Auto Lymphocyte %: 12.4 %   Auto Monocyte %: 11.4 %   Auto Eosinophil %: 0.1 %   Auto Basophil %: 0.2 %   Auto Immature Granulocyte %: 0.4 %       Basic Metabolic Panel in AM (10.12.20 @ 00:13)   Sodium, Serum: 140 mmol/L   Potassium, Serum: 4.1 mmol/L   Chloride, Serum: 105 mmol/L   Carbon Dioxide, Serum: 24 mmol/L   Anion Gap, Serum: 11 mmol/L   Blood Urea Nitrogen, Serum: 21 mg/dL   Creatinine, Serum: 0.8 mg/dL   Glucose, Serum: 123 mg/dL   Calcium, Total Serum: 9.0 mg/dL   eGFR if Non : 130: Interpretative comment   The units for eGFR are mL/min/1.73M2 (normalized body surface area). The   eGFR is calculated from a serum creatinine using the CKD-EPI equation.   Other variables required for calculation are race, age and sex. Among   patients with chronic kidney disease (CKD), the eGFR is useful in   determining the stage of disease according to KDOQI CKD classification.   All eGFR results are reported numerically with the following   interpretation.   GFR With Without   (ml/min/1.73 m2) Kidney Damage Kidney Damage   >= 90 Stage 1 Normal   60-89 Stage 2 Decreased GFR   30-59 Stage 3 Stage 3   15-29 Stage 4 Stage 4   < 15 Stage 5 Stage 5   Each stage of CKD assumes that the associated GFR level has been in

## 2020-10-14 NOTE — ED PROVIDER NOTE - OBJECTIVE STATEMENT
19 year old male, past medical history anxiety, recent l5-s1 spinal fusion, who presents w/ fever that began last night. 19 year old male, past medical history anxiety, recent l5-s1 spinal fusion, who presents w/ fever that began last night. Patient w/ L5-S1 fusion 9/25 performed by Dr Reyes, re-presented to ED 10/2 w/ back pain, vomiting, headache, went back to surgery 10/3 for csf leak repair, discharged 10/12. Patient with fever, tmax 102.2 last night @ 1030am, took tylenol @ 1230/430am, fever 100.6 this am. Patient visited by [facility] this am/spoke with Dr Scherer, neurosurgery on call, prompting presentation to ED today. No chest pain, URI symptoms, abd pain, vomiting, diarrhea, urinary symptoms, urinary/bowel incontinence, saddle anesthesias. Patient with R 5th toe paresthesias since surgery. Patient also endorses R-sided neck pain since hospital admission, no UE paresthesias/weakness.

## 2020-10-14 NOTE — H&P ADULT - HISTORY OF PRESENT ILLNESS
19 year old male with hx of L5-S1 TLIF on 9/25 pt went back to the OR on 10.3 for repair of CSF leak and placement of lumbar drain.  Pt presented to the ER for history of fever that went up to 102. Pt states he had some right sided neck pain but he had it also in the ICU , reports he does not have a headache like the spinal HA he had

## 2020-10-14 NOTE — ED PROVIDER NOTE - NS ED ROS FT
Review of Systems:  	•	CONSTITUTIONAL: no fever, no diaphoresis, no chills  	•	SKIN: no rash  	•	EYES: no vision changes  	•	ENT: no change in hearing, no tinnitus   	•	RESPIRATORY: no cough  	•	CARDIAC: no chest pain, no palpitations  	•	GI: +nausea, no vomiting, no abd pain, no diarrhea  	•	GENITO-URINARY: no dysuria; no hematuria, no increased urinary frequency  	•	MUSCULOSKELETAL: +R sided neck pain, no joint swelling/redness  	•	NEUROLOGIC: +R 5th toe paresthesias. no weakness/numbness, no saddle anesthesias, no urinary/bowel incontinence, no headache

## 2020-10-14 NOTE — ED PROVIDER NOTE - CLINICAL SUMMARY MEDICAL DECISION MAKING FREE TEXT BOX
I personally evaluated the patient. I reviewed the Physician Assistant’s note (as assigned above), and agree with the findings and plan except as documented in my note.   18 y/o M PMHx chronic sports related vertebral degenerative changes, anxiety, s/p L5-S1 TLIF with repaired dural leak with Dr. Reyes neurosurgery on 9/25/20, s/p repair of another dural defect on 10/3/20 for CSF leak with sx of nausea HA. Pt was d/c home on 10/12/20. Pt spiked a fever last night at 10:30 pm, tmax 102.2, and was given Tylenol at 12:30 am and 4:30 am. Fever improved to low 100s but continued through this morning. Pt was due to have VNS visit this morning however upon hearing the pt was having fever she advised that he go to the ER. Pt discussed the fever Dr. Scherer, who was covering for Dr. Reyes last night, who suggested giving Tylenol and have the visiting nurse service input.  No vomiting but mild nausea. Pt also c/o neck stiffness throughout the neck that developed a day prior to d/c and thinks it is from sleeping incorrectly and c/o some associated HA. States HA is different from his spinal HA when he had the CSF leak and is not as severe. Reports at baseline “pins and needles” sensation to R 5th toe since surgery. No numbness, tingling, or weakness. No bowel or bladder issues. Per mother, wound has been healing well. No drainage. No URI sx. No sick contacts. Pt is only now taking Valium, which he took last night, and Tylenol. Exam: Gen - NAD, Head - NCAT, Pharynx - clear, MMM, Neck: Minimally limited flexion due to discomfort.  No spinal tenderness. Negative Kernig, negative Brudzinski signs. Heart - RRR, no m/g/r, Lungs - CTAB, no w/c/r, Abdomen - soft, NT, ND, Skin - No rash, Back: surgical site from L5-S1 with vertical incision and sutures in place. Well healing. No erythema. No discharge. No tenderness. Central lumbar drain site well healed. No erythema. No discharge. No spinal tenderness. Ext- FROM, no edema, erythema, ecchymosis, Neuro - CN 2-12 intact, nl strength and sensation, gait with limited assessment due to pt discomfort. Able to walk a few steps from wheelchair to stretcher. Plan: Xr, labs, Zofran, fluids, and neurosurgery consult. I personally evaluated the patient. I reviewed the Physician Assistant’s note (as assigned above), and agree with the findings and plan except as documented in my note.   20 y/o M PMHx chronic sports related vertebral degenerative changes, anxiety, s/p L5-S1 TLIF with repaired dural leak with Dr. Reyes neurosurgery on 9/25/20, s/p repair of another dural defect on 10/3/20 for CSF leak with sx of nausea HA. Pt was d/c home on 10/12/20. Pt spiked a fever last night at 10:30 pm, tmax 102.2, and was given Tylenol at 12:30 am and 4:30 am. Fever improved to low 100s but continued through this morning. Pt was due to have VNS visit this morning however upon hearing the pt was having fever she advised that he go to the ER. Pt discussed the fever Dr. Scherer, who was covering for Dr. Reyes last night, who suggested giving Tylenol and have the visiting nurse service input.  No vomiting but mild nausea. Pt also c/o neck stiffness throughout the neck that developed a day prior to d/c and thinks it is from sleeping incorrectly and c/o some associated HA. States HA is different from his spinal HA when he had the CSF leak and is not as severe. Reports at baseline “pins and needles” sensation to R 5th toe since surgery. No numbness, tingling, or weakness. No bowel or bladder issues. Per mother, wound has been healing well. No drainage. No URI sx. No sick contacts. Pt is only now taking Valium, which he took last night, and Tylenol. Exam: Gen - NAD, Head - NCAT, Pharynx - clear, MMM, Neck: Minimally limited flexion due to discomfort.  No spinal tenderness. Negative Kernig, negative Brudzinski signs. Heart - RRR, no m/g/r, Lungs - CTAB, no w/c/r, Abdomen - soft, NT, ND, Skin - No rash, Back: surgical site from L5-S1 with vertical incision and sutures in place. Well healing. No erythema. No discharge. No tenderness. Central lumbar drain site well healed. No erythema. No discharge. No spinal tenderness. Ext- FROM, no edema, erythema, ecchymosis, Neuro - CN 2-12 intact, nl strength and sensation, gait with limited assessment due to pt discomfort. Able to walk a few steps from wheelchair to stretcher. Plan: Xr, labs, Zofran, fluids, and neurosurgery consult. CXR clear. Neurosurgery recommended admission for observation for fever. Dx: Fever.

## 2020-10-14 NOTE — ED PROVIDER NOTE - PHYSICAL EXAMINATION
CONSTITUTIONAL: Well-developed; well-nourished; in no acute distress, nontoxic appearing  SKIN: skin exam is warm and dry,  HEAD: Normocephalic; atraumatic.  EYES: PERRL, conjunctiva and sclera clear.  ENT: Dry MM, oropharynx non-erythematous, uvula midline, no exudates, speaking full sentences   NECK: +R sided paracervical TTP, no midline tenderness. Negative brudzinskis, negative kernigs.  CARD: S1, S2 normal, no murmur  RESP: No wheezes, rales or rhonchi. Good air movement bilaterally  ABD: soft; non-distended; non-tender. No Rebound, No guarding  EXT: Normal ROM. No calf tenderness/swelling  NEURO: awake, alert, following commands, oriented, grossly unremarkable. No Focal deficits. GCS 15.   PSYCH: Cooperative, appropriate. CONSTITUTIONAL: Well-developed; well-nourished; in no acute distress, nontoxic appearing  SKIN: surgical site overlying l5-s1 fusion clean dry intact, no active drainage/bleeding, surrounding erythema/warmth. lumbar drainage site, no surrounding redness/tenderness/drainage  HEAD: Normocephalic; atraumatic.  EYES: PERRL, conjunctiva and sclera clear.  ENT: Dry MM, oropharynx non-erythematous, uvula midline, no exudates, speaking full sentences   NECK: +R sided paracervical TTP, no midline tenderness. Negative brudzinskis, negative kernigs.  CARD: S1, S2 normal, no murmur  RESP: No wheezes, rales or rhonchi. Good air movement bilaterally  ABD: soft; non-distended; non-tender. No Rebound, No guarding  EXT: Normal ROM. No calf tenderness/swelling  NEURO: awake, alert, following commands, oriented, grossly unremarkable. No Focal deficits. GCS 15.   PSYCH: Cooperative, appropriate.

## 2020-10-14 NOTE — H&P ADULT - NSHPPHYSICALEXAM_GEN_ALL_CORE
general -WN, WD NAD  Neck no nucchal rigidity   chest CTA bilateral   Abdomen + BS NT ND soft   ext no C/C/E   Neuro A&OX3, PERRL             MAEX4 ,              MS bilateral UEs 5/5                   bilateral LE's 5/5  back incision no drainage no swelling intact

## 2020-10-14 NOTE — ED PROVIDER NOTE - PROGRESS NOTE DETAILS
adan: discussed case w/ neurosurgery [facility], they are aware of patient and will evaluate in ED. adan: patient evaluated by neurosurgery, will admit for observation under dr chase. will admit.

## 2020-10-14 NOTE — ED PEDIATRIC NURSE NOTE - OBJECTIVE STATEMENT
Pt. came to ED for c/o fever Pt. came to ED for c/o fever that started yesterday. Tmax 102.2. Pt. also c/o stiff neck and headache. Pt. had spinal fusion on 9/21 and on 10/2 developed CSF leak, drain placed. D/c'd 10/12. Denies any other symptoms. Surgical site is not red, swollen, or warm.

## 2020-10-15 LAB
ALBUMIN SERPL ELPH-MCNC: 4 G/DL — SIGNIFICANT CHANGE UP (ref 3.5–5.2)
ALP SERPL-CCNC: 61 U/L — SIGNIFICANT CHANGE UP (ref 30–115)
ALT FLD-CCNC: 35 U/L — SIGNIFICANT CHANGE UP (ref 13–38)
ANION GAP SERPL CALC-SCNC: 14 MMOL/L — SIGNIFICANT CHANGE UP (ref 7–14)
AST SERPL-CCNC: 21 U/L — SIGNIFICANT CHANGE UP (ref 13–38)
BILIRUB SERPL-MCNC: 0.6 MG/DL — SIGNIFICANT CHANGE UP (ref 0.2–1.2)
BUN SERPL-MCNC: 12 MG/DL — SIGNIFICANT CHANGE UP (ref 10–20)
CALCIUM SERPL-MCNC: 9.3 MG/DL — SIGNIFICANT CHANGE UP (ref 8.5–10.1)
CHLORIDE SERPL-SCNC: 99 MMOL/L — SIGNIFICANT CHANGE UP (ref 98–110)
CO2 SERPL-SCNC: 21 MMOL/L — SIGNIFICANT CHANGE UP (ref 17–32)
CREAT SERPL-MCNC: 0.8 MG/DL — SIGNIFICANT CHANGE UP (ref 0.3–1)
CULTURE RESULTS: SIGNIFICANT CHANGE UP
GLUCOSE SERPL-MCNC: 107 MG/DL — HIGH (ref 70–99)
HCT VFR BLD CALC: 37.9 % — LOW (ref 42–52)
HGB BLD-MCNC: 12.6 G/DL — LOW (ref 14–18)
MCHC RBC-ENTMCNC: 29.4 PG — SIGNIFICANT CHANGE UP (ref 27–31)
MCHC RBC-ENTMCNC: 33.2 G/DL — SIGNIFICANT CHANGE UP (ref 32–37)
MCV RBC AUTO: 88.3 FL — SIGNIFICANT CHANGE UP (ref 80–94)
NRBC # BLD: 0 /100 WBCS — SIGNIFICANT CHANGE UP (ref 0–0)
PLATELET # BLD AUTO: 283 K/UL — SIGNIFICANT CHANGE UP (ref 130–400)
POTASSIUM SERPL-MCNC: 4.4 MMOL/L — SIGNIFICANT CHANGE UP (ref 3.5–5)
POTASSIUM SERPL-SCNC: 4.4 MMOL/L — SIGNIFICANT CHANGE UP (ref 3.5–5)
PROT SERPL-MCNC: 6.9 G/DL — SIGNIFICANT CHANGE UP (ref 6.1–8)
RBC # BLD: 4.29 M/UL — LOW (ref 4.7–6.1)
RBC # FLD: 12.9 % — SIGNIFICANT CHANGE UP (ref 11.5–14.5)
SODIUM SERPL-SCNC: 134 MMOL/L — LOW (ref 135–146)
SPECIMEN SOURCE: SIGNIFICANT CHANGE UP
WBC # BLD: 16.51 K/UL — HIGH (ref 4.8–10.8)
WBC # FLD AUTO: 16.51 K/UL — HIGH (ref 4.8–10.8)

## 2020-10-15 RX ORDER — VANCOMYCIN HCL 1 G
1000 VIAL (EA) INTRAVENOUS ONCE
Refills: 0 | Status: COMPLETED | OUTPATIENT
Start: 2020-10-15 | End: 2020-10-15

## 2020-10-15 RX ORDER — CEFEPIME 1 G/1
2000 INJECTION, POWDER, FOR SOLUTION INTRAMUSCULAR; INTRAVENOUS EVERY 12 HOURS
Refills: 0 | Status: DISCONTINUED | OUTPATIENT
Start: 2020-10-15 | End: 2020-10-16

## 2020-10-15 RX ORDER — CEFEPIME 1 G/1
2000 INJECTION, POWDER, FOR SOLUTION INTRAMUSCULAR; INTRAVENOUS ONCE
Refills: 0 | Status: COMPLETED | OUTPATIENT
Start: 2020-10-15 | End: 2020-10-15

## 2020-10-15 RX ORDER — VANCOMYCIN HCL 1 G
1000 VIAL (EA) INTRAVENOUS EVERY 12 HOURS
Refills: 0 | Status: DISCONTINUED | OUTPATIENT
Start: 2020-10-15 | End: 2020-10-16

## 2020-10-15 RX ORDER — CEFEPIME 1 G/1
INJECTION, POWDER, FOR SOLUTION INTRAMUSCULAR; INTRAVENOUS
Refills: 0 | Status: DISCONTINUED | OUTPATIENT
Start: 2020-10-15 | End: 2020-10-16

## 2020-10-15 RX ORDER — LANOLIN ALCOHOL/MO/W.PET/CERES
10 CREAM (GRAM) TOPICAL AT BEDTIME
Refills: 0 | Status: DISCONTINUED | OUTPATIENT
Start: 2020-10-15 | End: 2020-10-20

## 2020-10-15 RX ORDER — VANCOMYCIN HCL 1 G
VIAL (EA) INTRAVENOUS
Refills: 0 | Status: DISCONTINUED | OUTPATIENT
Start: 2020-10-15 | End: 2020-10-16

## 2020-10-15 RX ADMIN — CEFEPIME 100 MILLIGRAM(S): 1 INJECTION, POWDER, FOR SOLUTION INTRAMUSCULAR; INTRAVENOUS at 12:11

## 2020-10-15 RX ADMIN — PANTOPRAZOLE SODIUM 40 MILLIGRAM(S): 20 TABLET, DELAYED RELEASE ORAL at 05:47

## 2020-10-15 RX ADMIN — OXYCODONE AND ACETAMINOPHEN 1 TABLET(S): 5; 325 TABLET ORAL at 09:32

## 2020-10-15 RX ADMIN — ONDANSETRON 4 MILLIGRAM(S): 8 TABLET, FILM COATED ORAL at 18:13

## 2020-10-15 RX ADMIN — CEFEPIME 100 MILLIGRAM(S): 1 INJECTION, POWDER, FOR SOLUTION INTRAMUSCULAR; INTRAVENOUS at 18:14

## 2020-10-15 RX ADMIN — Medication 650 MILLIGRAM(S): at 03:41

## 2020-10-15 RX ADMIN — OXYCODONE AND ACETAMINOPHEN 2 TABLET(S): 5; 325 TABLET ORAL at 18:48

## 2020-10-15 RX ADMIN — ONDANSETRON 4 MILLIGRAM(S): 8 TABLET, FILM COATED ORAL at 08:42

## 2020-10-15 RX ADMIN — OXYCODONE AND ACETAMINOPHEN 1 TABLET(S): 5; 325 TABLET ORAL at 08:42

## 2020-10-15 RX ADMIN — METHOCARBAMOL 750 MILLIGRAM(S): 500 TABLET, FILM COATED ORAL at 12:10

## 2020-10-15 RX ADMIN — Medication 250 MILLIGRAM(S): at 12:11

## 2020-10-15 RX ADMIN — METHOCARBAMOL 750 MILLIGRAM(S): 500 TABLET, FILM COATED ORAL at 05:46

## 2020-10-15 RX ADMIN — Medication 1 TABLET(S): at 12:10

## 2020-10-15 RX ADMIN — OXYCODONE AND ACETAMINOPHEN 1 TABLET(S): 5; 325 TABLET ORAL at 09:02

## 2020-10-15 RX ADMIN — METHOCARBAMOL 750 MILLIGRAM(S): 500 TABLET, FILM COATED ORAL at 18:19

## 2020-10-15 RX ADMIN — OXYCODONE AND ACETAMINOPHEN 2 TABLET(S): 5; 325 TABLET ORAL at 09:32

## 2020-10-15 RX ADMIN — Medication 250 MILLIGRAM(S): at 18:14

## 2020-10-15 RX ADMIN — Medication 650 MILLIGRAM(S): at 22:25

## 2020-10-15 NOTE — CONSULT NOTE ADULT - ASSESSMENT
ASSESSMENT  19 year old male with hx of L5-S1 Transforaminal lumbar interbody fusion on 9/25 pt went back to the OR on 10/3 for repair of CSF leak and placement of lumbar drain.  Pt presented to the ER for history of fever that went up to 102.     IMPRESSION  #    Exploration, wound, lumbar region, with repair of dural defect if indicated, for CSF leak 03-Oct-2020 12:36:13 left L5-S1    Fusion, spine, lumbar, TLIF 25-Sep-2020 16:37:26 Left L5-S1, with bilateral L4-S1 pedicle fusion "Grade II spondylolisthesis at L5-S1, with severe degenerative disc disease, facet arthropathy and neural compression.  CSF durotomy incurred during decompression, which was controlled and repaired by Duragen on-lay matrix."    < from: CT Lumbar Spine No Cont (10.02.20 @ 17:15) >Bilateral dorsal subcutaneous fluid collection centered at about L3/L4, measuring up to 5.5 cm on the right side and 8.5 cm on the left side. Additional edema/ill-defined fluid collection within the left dorsal paraspinal musculature measuring up to 7 cm. Since the clinical concern is for CSF leak, a CSF leak is not excluded.  #Sepsis on admission T>101F, Pulse>90,  WBC 18  #Morbid Obesity BMI (kg/m2): 37.1      RECOMMENDATIONS  This is an incomplete consult note. All recommendations to follow after interview and examination of the patient.     If any questions, please call or send a message on [employer] Teams  Spectra [id number] ASSESSMENT  19 year old male with hx of L5-S1 Transforaminal lumbar interbody fusion on 9/25 pt went back to the OR on 10/3 for repair of CSF leak and placement of lumbar drain.  Pt presented to the ER for history of fever that went up to 102.     IMPRESSION  #Suspect post-op meningitis     Exam with meningeal signs    Exploration, wound, lumbar region, with repair of dural defect if indicated, for CSF leak 03-Oct-2020 12:36:13 left L5-S1    Fusion, spine, lumbar, TLIF 25-Sep-2020 16:37:26 Left L5-S1, with bilateral L4-S1 pedicle fusion "Grade II spondylolisthesis at L5-S1, with severe degenerative disc disease, facet arthropathy and neural compression.  CSF durotomy incurred during decompression, which was controlled and repaired by Duragen on-lay matrix."    < from: CT Lumbar Spine No Cont (10.02.20 @ 17:15) >Bilateral dorsal subcutaneous fluid collection centered at about L3/L4, measuring up to 5.5 cm on the right side and 8.5 cm on the left side. Additional edema/ill-defined fluid collection within the left dorsal paraspinal musculature measuring up to 7 cm. Since the clinical concern is for CSF leak, a CSF leak is not excluded.  #Sepsis on admission T>101F, Pulse>90,  WBC 18  #Morbid Obesity BMI (kg/m2): 37.1      RECOMMENDATIONS  - Discussed case with Dr. Reyes, pt high risk and difficult access for LP  - Will treat empirically for post-op meningitis  - Cefepime 2g q8h IV  - Vanc 1.25 q8h IV   - Please check vanc trough 30 min prior to 4th dose   - f/u BCX  - SEND ESR, CRP  - Will need PICC and 14-21 days of IV ABX  - Per NSGY, no role for repeat imaging  - Can discuss if his infusion company will do Vanc via midline, most will not    If any questions, please call or send a message on [employer] Teams  Spectra [id number]

## 2020-10-15 NOTE — PROGRESS NOTE ADULT - SUBJECTIVE AND OBJECTIVE BOX
HD #2     Fevers    Pt seen and examined at bedside. Pt c/o neck pain.     Vital Signs Last 24 Hrs  T(C): 38.6 (15 Oct 2020 08:07), Max: 38.6 (15 Oct 2020 08:07)  T(F): 101.5 (15 Oct 2020 08:07), Max: 101.5 (15 Oct 2020 08:07)  HR: 101 (15 Oct 2020 05:10) (97 - 103)  BP: 131/65 (15 Oct 2020 05:10) (123/62 - 135/60)  BP(mean): --  RR: 18 (15 Oct 2020 05:10) (18 - 18)  SpO2: 99% (14 Oct 2020 20:30) (97% - 99%)    PHYSICAL EXAM:          MEDICATIONS:  Antibiotics:  cefepime   IVPB      vancomycin  IVPB        Neuro:  acetaminophen   Tablet .. 650 milliGRAM(s) Oral every 6 hours PRN  methocarbamol 750 milliGRAM(s) Oral every 6 hours PRN  ondansetron Injectable 4 milliGRAM(s) IV Push every 6 hours PRN  oxycodone    5 mG/acetaminophen 325 mG 1 Tablet(s) Oral every 6 hours PRN  oxycodone    5 mG/acetaminophen 325 mG 2 Tablet(s) Oral every 6 hours PRN    Anticoagulation:    OTHER:  pantoprazole    Tablet 40 milliGRAM(s) Oral before breakfast  senna 2 Tablet(s) Oral at bedtime PRN    IVF:  dextrose 5% + sodium chloride 0.45%. 1000 milliLiter(s) IV Continuous <Continuous>  multivitamin 1 Tablet(s) Oral daily        LABS:                        13.1   18.04 )-----------( 275      ( 14 Oct 2020 12:15 )             39.4     10-14    138  |  102  |  14  ----------------------------<  114<H>  4.2   |  23  |  0.9    Ca    9.4      14 Oct 2020 12:15    TPro  6.9  /  Alb  4.1  /  TBili  0.7  /  DBili  x   /  AST  14  /  ALT  30  /  AlkPhos  62  10-14      Urinalysis Basic - ( 14 Oct 2020 17:20 )    Color: Yellow / Appearance: Clear / S.027 / pH: x  Gluc: x / Ketone: Negative  / Bili: Negative / Urobili: <2 mg/dL   Blood: x / Protein: 30 mg/dL / Nitrite: Negative   Leuk Esterase: Negative / RBC: 2 /HPF / WBC 1 /HPF   Sq Epi: x / Non Sq Epi: 1 /HPF / Bacteria: Negative        RADIOLOGY:      Assessment:      Plan:

## 2020-10-15 NOTE — CONSULT NOTE ADULT - SUBJECTIVE AND OBJECTIVE BOX
BINH BECKHAM  19y, Male  Allergy: No Known Allergies       CHIEF COMPLAINT: POst op fever (14 Oct 2020 12:44)      LOS  1d    HPI:  19 year old male with hx of L5-S1 TLIF on  pt went back to the OR on 10.3 for repair of CSF leak and placement of lumbar drain.  Pt presented to the ER for history of fever that went up to 102. Pt states he had some right sided neck pain but he had it also in the ICU , reports he does not have a headache like the spinal HA he had  (14 Oct 2020 12:44)      INFECTIOUS DISEASE HISTORY:   pending patient interview and exam     PAST MEDICAL & SURGICAL HISTORY:  Anxiety    H/O spinal fusion    S/P ACL repair  x2    History of tonsillectomy        FAMILY HISTORY  Family history of diabetes mellitus (DM)        SOCIAL HISTORY  Social History:  Denies tobacco  use   Denies Alcohol   Denies IVDA (14 Oct 2020 12:44)        ROS  ***    VITALS:  T(F): 101.5, Max: 101.5 (10-15-20 @ 08:07)  HR: 101  BP: 131/65  RR: 18Vital Signs Last 24 Hrs  T(C): 38.6 (15 Oct 2020 08:07), Max: 38.6 (15 Oct 2020 08:07)  T(F): 101.5 (15 Oct 2020 08:07), Max: 101.5 (15 Oct 2020 08:07)  HR: 101 (15 Oct 2020 05:10) (97 - 111)  BP: 131/65 (15 Oct 2020 05:10) (117/65 - 135/60)  BP(mean): --  RR: 18 (15 Oct 2020 05:10) (18 - 18)  SpO2: 99% (14 Oct 2020 20:30) (97% - 99%)    PHYSICAL EXAM:  ***    TESTS & MEASUREMENTS:                        13.1   18.04 )-----------( 275      ( 14 Oct 2020 12:15 )             39.4     10-14    138  |  102  |  14  ----------------------------<  114<H>  4.2   |  23  |  0.9    Ca    9.4      14 Oct 2020 12:15    TPro  6.9  /  Alb  4.1  /  TBili  0.7  /  DBili  x   /  AST  14  /  ALT  30  /  AlkPhos  62  10-14    eGFR if Non African American: 123 mL/min/1.73M2 (10-14-20 @ 12:15)  eGFR if : 143 mL/min/1.73M2 (10-14-20 @ 12:15)    LIVER FUNCTIONS - ( 14 Oct 2020 12:15 )  Alb: 4.1 g/dL / Pro: 6.9 g/dL / ALK PHOS: 62 U/L / ALT: 30 U/L / AST: 14 U/L / GGT: x           Urinalysis Basic - ( 14 Oct 2020 17:20 )    Color: Yellow / Appearance: Clear / S.027 / pH: x  Gluc: x / Ketone: Negative  / Bili: Negative / Urobili: <2 mg/dL   Blood: x / Protein: 30 mg/dL / Nitrite: Negative   Leuk Esterase: Negative / RBC: 2 /HPF / WBC 1 /HPF   Sq Epi: x / Non Sq Epi: 1 /HPF / Bacteria: Negative        Culture - Urine (collected 20 @ 19:15)  Source: .Urine Clean Catch (Midstream)  Final Report (20 @ 23:46):    <10,000 CFU/mL Normal Urogenital Meggan            INFECTIOUS DISEASES TESTING  COVID-19 PCR: NotDetec (10-02-20 @ 07:46)      RADIOLOGY & ADDITIONAL TESTS:  I have personally reviewed the last Chest xray  CXR  Xray Chest 1 View AP/PA:   EXAM:  XR CHEST FRONTAL 1V            PROCEDURE DATE:  10/14/2020            INTERPRETATION:  Clinical History / Reason for exam: Fever    Comparison : Chest radiograph 10/11/2020.    Technique/Positioning: Single AP chest radiograph.    Findings:    Support devices: None.    Cardiac/mediastinum/hilum: Unremarkable.    Lung parenchyma/Pleura: Within normal limits.    Skeleton/soft tissues: Unremarkable.    Impression:    No focal consolidation.                JUANCARLOS GUAJARDO M.D., ATTENDING RADIOLOGIST  This document has been electronically signed. Oct 14 2020  2:33PM (10-14-20 @ 13:14)      CT      CARDIOLOGY TESTING  12 Lead ECG:   Ventricular Rate 106 BPM    Atrial Rate 106 BPM    P-R Interval 140 ms    QRS Duration 96 ms    Q-T Interval 340 ms    QTC Calculation(Bazett) 451 ms    P Axis 53 degrees    R Axis -4 degrees    T Axis 27 degrees    Diagnosis Line Sinus tachycardia  Otherwise normal ECG    Confirmed by FELICIA JACOME MD ([id number]) on 10/5/2020 10:35:32 PM (10-05-20 @ 21:16)  12 Lead ECG:   Systolic  mmHg    Diastolic BP 69 mmHg    Ventricular Rate 97 BPM    Atrial Rate 97 BPM    P-R Interval 144 ms    QRS Duration 100 ms    Q-T Interval 358 ms    QTC Calculation(Bazett) 454 ms    P Axis 68 degrees    R Axis -59 degrees    T Axis 42 degrees    Diagnosis Line Normal sinus rhythm  Left anterior fascicular block  Abnormal ECG    Confirmed by Omkar Franklin ([id number]) on 10/3/2020 2:27:15 PM (10-03-20 @ 12:21)      MEDICATIONS  dextrose 5% + sodium chloride 0.45%. 1000 IV Continuous <Continuous>  multivitamin 1 Oral daily  pantoprazole    Tablet 40 Oral before breakfast  sodium chloride 0.45% with potassium chloride 20 mEq/L 1000 IV Continuous <Continuous>      Weight  Weight (kg): 145.5 (10-14-20 @ 10:50)    ANTIBIOTICS:      ALLERGIES:  No Known Allergies           BINH BECKHAM  19y, Male  Allergy: No Known Allergies       CHIEF COMPLAINT: POst op fever (14 Oct 2020 12:44)      LOS  1d    HPI:  19 year old male with hx of L5-S1 TLIF on  pt went back to the OR on 10.3 for repair of CSF leak and placement of lumbar drain.  Pt presented to the ER for history of fever that went up to 102. Pt states he had some right sided neck pain but he had it also in the ICU , reports he does not have a headache like the spinal HA he had  (14 Oct 2020 12:44)      INFECTIOUS DISEASE HISTORY:  Patient reports severe neck pain, pain with movement. +HA. No photo/phonophobia  Pt denies rhinorrhea, sore throat, cough, SOB, abd pain, diarrhea, n/v, dysuria, rash, arthralgias.     PAST MEDICAL & SURGICAL HISTORY:  Anxiety    H/O spinal fusion    S/P ACL repair  x2    History of tonsillectomy        FAMILY HISTORY  Family history of diabetes mellitus (DM)        SOCIAL HISTORY  Social History:  Denies tobacco  use   Denies Alcohol   Denies IVDA (14 Oct 2020 12:44)        ROS  General: Denies rigors, nightsweats  HEENT: as noted above   CV: Denies CP, palpitations  PULM: Denies wheezing, hemoptysis  GI: Denies hematemesis, hematochezia, melena  : Denies discharge, hematuria  MSK: Denies arthralgias, myalgias  SKIN: Denies rash, lesions  NEURO: Denies paresthesias, weakness  PSYCH: Denies depression, anxiety     VITALS:  T(F): 101.5, Max: 101.5 (10-15- @ 08:07)  HR: 101  BP: 131/65  RR: 18Vital Signs Last 24 Hrs  T(C): 38.6 (15 Oct 2020 08:07), Max: 38.6 (15 Oct 2020 08:07)  T(F): 101.5 (15 Oct 2020 08:07), Max: 101.5 (15 Oct 2020 08:07)  HR: 101 (15 Oct 2020 05:10) (97 - 111)  BP: 131/65 (15 Oct 2020 05:10) (117/65 - 135/60)  BP(mean): --  RR: 18 (15 Oct 2020 05:10) (18 - 18)  SpO2: 99% (14 Oct 2020 20:30) (97% - 99%)    PHYSICAL EXAM:  Gen: NAD, resting in bed  HEENT: Normocephalic, atraumatic  Neck: supple, no lymphadenopathy, + Stiffness, difficulty touching chin to chest  CV: Regular rate & regular rhythm  Lungs: decreased BS at bases, no fremitus  Abdomen: Soft, BS present  Ext: Warm, well perfused  Neuro: non focal, awake  Skin: no rash, no erythema, lumbar incision clean, no erythema/purulence   Lines: no phlebitis     TESTS & MEASUREMENTS:                        13.1   18.04 )-----------( 275      ( 14 Oct 2020 12:15 )             39.4     10-    138  |  102  |  14  ----------------------------<  114<H>  4.2   |  23  |  0.9    Ca    9.4      14 Oct 2020 12:15    TPro  6.9  /  Alb  4.1  /  TBili  0.7  /  DBili  x   /  AST  14  /  ALT  30  /  AlkPhos  62  10-    eGFR if Non African American: 123 mL/min/1.73M2 (10-14-20 @ 12:15)  eGFR if : 143 mL/min/1.73M2 (10-14-20 @ 12:15)    LIVER FUNCTIONS - ( 14 Oct 2020 12:15 )  Alb: 4.1 g/dL / Pro: 6.9 g/dL / ALK PHOS: 62 U/L / ALT: 30 U/L / AST: 14 U/L / GGT: x           Urinalysis Basic - ( 14 Oct 2020 17:20 )    Color: Yellow / Appearance: Clear / S.027 / pH: x  Gluc: x / Ketone: Negative  / Bili: Negative / Urobili: <2 mg/dL   Blood: x / Protein: 30 mg/dL / Nitrite: Negative   Leuk Esterase: Negative / RBC: 2 /HPF / WBC 1 /HPF   Sq Epi: x / Non Sq Epi: 1 /HPF / Bacteria: Negative        Culture - Urine (collected 20 @ 19:15)  Source: .Urine Clean Catch (Midstream)  Final Report (20 @ 23:46):    <10,000 CFU/mL Normal Urogenital Meggan            INFECTIOUS DISEASES TESTING  COVID-19 PCR: NotDetec (10-02-20 @ 07:46)      RADIOLOGY & ADDITIONAL TESTS:  I have personally reviewed the last Chest xray  CXR  Xray Chest 1 View AP/PA:   EXAM:  XR CHEST FRONTAL 1V            PROCEDURE DATE:  10/14/2020            INTERPRETATION:  Clinical History / Reason for exam: Fever    Comparison : Chest radiograph 10/11/2020.    Technique/Positioning: Single AP chest radiograph.    Findings:    Support devices: None.    Cardiac/mediastinum/hilum: Unremarkable.    Lung parenchyma/Pleura: Within normal limits.    Skeleton/soft tissues: Unremarkable.    Impression:    No focal consolidation.                JUANCARLOS GUAJARDO M.D., ATTENDING RADIOLOGIST  This document has been electronically signed. Oct 14 2020  2:33PM (10-14-20 @ 13:14)      CT      CARDIOLOGY TESTING  12 Lead ECG:   Ventricular Rate 106 BPM    Atrial Rate 106 BPM    P-R Interval 140 ms    QRS Duration 96 ms    Q-T Interval 340 ms    QTC Calculation(Bazett) 451 ms    P Axis 53 degrees    R Axis -4 degrees    T Axis 27 degrees    Diagnosis Line Sinus tachycardia  Otherwise normal ECG    Confirmed by FELICIA JACOME MD ([id number]) on 10/5/2020 10:35:32 PM (10-05-20 @ 21:16)  12 Lead ECG:   Systolic  mmHg    Diastolic BP 69 mmHg    Ventricular Rate 97 BPM    Atrial Rate 97 BPM    P-R Interval 144 ms    QRS Duration 100 ms    Q-T Interval 358 ms    QTC Calculation(Bazett) 454 ms    P Axis 68 degrees    R Axis -59 degrees    T Axis 42 degrees    Diagnosis Line Normal sinus rhythm  Left anterior fascicular block  Abnormal ECG    Confirmed by Omkar Franklin ([id number]) on 10/3/2020 2:27:15 PM (10-03-20 @ 12:21)      MEDICATIONS  dextrose 5% + sodium chloride 0.45%. 1000 IV Continuous <Continuous>  multivitamin 1 Oral daily  pantoprazole    Tablet 40 Oral before breakfast  sodium chloride 0.45% with potassium chloride 20 mEq/L 1000 IV Continuous <Continuous>      Weight  Weight (kg): 145.5 (10-14-20 @ 10:50)    ANTIBIOTICS:      ALLERGIES:  No Known Allergies

## 2020-10-16 RX ORDER — MULTIVIT WITH MIN/MFOLATE/K2 340-15/3 G
1 POWDER (GRAM) ORAL ONCE
Refills: 0 | Status: DISCONTINUED | OUTPATIENT
Start: 2020-10-16 | End: 2020-10-20

## 2020-10-16 RX ORDER — HEPARIN SODIUM 5000 [USP'U]/ML
5000 INJECTION INTRAVENOUS; SUBCUTANEOUS EVERY 8 HOURS
Refills: 0 | Status: DISCONTINUED | OUTPATIENT
Start: 2020-10-16 | End: 2020-10-20

## 2020-10-16 RX ORDER — LACTOBACILLUS ACIDOPHILUS 100MM CELL
1 CAPSULE ORAL DAILY
Refills: 0 | Status: DISCONTINUED | OUTPATIENT
Start: 2020-10-16 | End: 2020-10-20

## 2020-10-16 RX ORDER — POLYETHYLENE GLYCOL 3350 17 G/17G
17 POWDER, FOR SOLUTION ORAL DAILY
Refills: 0 | Status: DISCONTINUED | OUTPATIENT
Start: 2020-10-16 | End: 2020-10-20

## 2020-10-16 RX ORDER — VANCOMYCIN HCL 1 G
1000 VIAL (EA) INTRAVENOUS EVERY 8 HOURS
Refills: 0 | Status: DISCONTINUED | OUTPATIENT
Start: 2020-10-16 | End: 2020-10-17

## 2020-10-16 RX ORDER — CEFEPIME 1 G/1
2000 INJECTION, POWDER, FOR SOLUTION INTRAMUSCULAR; INTRAVENOUS EVERY 8 HOURS
Refills: 0 | Status: DISCONTINUED | OUTPATIENT
Start: 2020-10-16 | End: 2020-10-20

## 2020-10-16 RX ADMIN — HEPARIN SODIUM 5000 UNIT(S): 5000 INJECTION INTRAVENOUS; SUBCUTANEOUS at 21:57

## 2020-10-16 RX ADMIN — Medication 250 MILLIGRAM(S): at 15:59

## 2020-10-16 RX ADMIN — Medication 1 TABLET(S): at 12:37

## 2020-10-16 RX ADMIN — METHOCARBAMOL 750 MILLIGRAM(S): 500 TABLET, FILM COATED ORAL at 00:23

## 2020-10-16 RX ADMIN — CEFEPIME 100 MILLIGRAM(S): 1 INJECTION, POWDER, FOR SOLUTION INTRAMUSCULAR; INTRAVENOUS at 21:57

## 2020-10-16 RX ADMIN — Medication 250 MILLIGRAM(S): at 06:16

## 2020-10-16 RX ADMIN — Medication 250 MILLIGRAM(S): at 22:23

## 2020-10-16 RX ADMIN — HEPARIN SODIUM 5000 UNIT(S): 5000 INJECTION INTRAVENOUS; SUBCUTANEOUS at 16:15

## 2020-10-16 RX ADMIN — METHOCARBAMOL 750 MILLIGRAM(S): 500 TABLET, FILM COATED ORAL at 06:56

## 2020-10-16 RX ADMIN — CEFEPIME 100 MILLIGRAM(S): 1 INJECTION, POWDER, FOR SOLUTION INTRAMUSCULAR; INTRAVENOUS at 06:16

## 2020-10-16 RX ADMIN — Medication 650 MILLIGRAM(S): at 22:04

## 2020-10-16 RX ADMIN — Medication 10 MILLIGRAM(S): at 00:23

## 2020-10-16 RX ADMIN — CEFEPIME 100 MILLIGRAM(S): 1 INJECTION, POWDER, FOR SOLUTION INTRAMUSCULAR; INTRAVENOUS at 15:59

## 2020-10-16 RX ADMIN — PANTOPRAZOLE SODIUM 40 MILLIGRAM(S): 20 TABLET, DELAYED RELEASE ORAL at 06:16

## 2020-10-16 RX ADMIN — Medication 10 MILLIGRAM(S): at 21:57

## 2020-10-16 NOTE — CHART NOTE - NSCHARTNOTEFT_GEN_A_CORE
Mother paged through answering service due to low grade fever that patient exhibited, per RN.     Pt had temp of ~100.     I advised mother that dosing adjustments made accounting for weight, sheila for Vancomycin, Per ID. I explained that Abx can take up to 72 hours to take therapeutic effect, sheila. dependent on weight and Vancomycin levels, troughs.       We will continue to monitor fever/temperature curve.  If patient continues to spike fever through Abx, further workup will need to be conducted.   Mother expressed understanding.     Discussed with KRISHNA Smith.

## 2020-10-16 NOTE — PROCEDURE NOTE - NSPROCDETAILS_GEN_ALL_CORE
supine position/ultrasound assessment/location identified, draped/prepped, sterile technique used/sterile dressing applied/sterile technique, catheter placed/Trendelenburg position

## 2020-10-16 NOTE — PROGRESS NOTE ADULT - SUBJECTIVE AND OBJECTIVE BOX
CHAPINCITOBINH PLATT  19y, Male  Allergy: No Known Allergies      LOS  2d    CHIEF COMPLAINT: POst op fever (15 Oct 2020 11:03)      INTERVAL EVENTS/HPI  - Febrile  - T(F): , Max: 103.1 (10-15-20 @ 18:38)  - Tolerating medication  - WBC Count: 16.51 (10-15-20 @ 10:53)  WBC Count: 18.04 (10-14-20 @ 12:15)  - Creatinine, Serum: 0.8 (10-15-20 @ 10:53)  Creatinine, Serum: 0.9 (10-14-20 @ 12:15)       ROS  ***    VITALS:  T(F): 97.8, Max: 103.1 (10-15-20 @ 18:38)  HR: 78  BP: 118/69  RR: 18Vital Signs Last 24 Hrs  T(C): 36.6 (16 Oct 2020 05:10), Max: 39.5 (15 Oct 2020 18:38)  T(F): 97.8 (16 Oct 2020 05:10), Max: 103.1 (15 Oct 2020 18:38)  HR: 78 (16 Oct 2020 05:10) (78 - 94)  BP: 118/69 (16 Oct 2020 05:10) (118/69 - 151/67)  BP(mean): --  RR: 18 (16 Oct 2020 05:10) (18 - 18)  SpO2: --    PHYSICAL EXAM:  ***    FH: Non-contributory  Social Hx: Non-contributory    TESTS & MEASUREMENTS:                        12.6   16.51 )-----------( 283      ( 15 Oct 2020 10:53 )             37.9     10-15    134<L>  |  99  |  12  ----------------------------<  107<H>  4.4   |  21  |  0.8    Ca    9.3      15 Oct 2020 10:53    TPro  6.9  /  Alb  4.0  /  TBili  0.6  /  DBili  x   /  AST  21  /  ALT  35  /  AlkPhos  61  10-15    eGFR if Non African American: 130 mL/min/1.73M2 (10-15-20 @ 10:53)  eGFR if African American: 150 mL/min/1.73M2 (10-15-20 @ 10:53)    LIVER FUNCTIONS - ( 15 Oct 2020 10:53 )  Alb: 4.0 g/dL / Pro: 6.9 g/dL / ALK PHOS: 61 U/L / ALT: 35 U/L / AST: 21 U/L / GGT: x           Urinalysis Basic - ( 14 Oct 2020 17:20 )    Color: Yellow / Appearance: Clear / S.027 / pH: x  Gluc: x / Ketone: Negative  / Bili: Negative / Urobili: <2 mg/dL   Blood: x / Protein: 30 mg/dL / Nitrite: Negative   Leuk Esterase: Negative / RBC: 2 /HPF / WBC 1 /HPF   Sq Epi: x / Non Sq Epi: 1 /HPF / Bacteria: Negative        Culture - Urine (collected 10-14-20 @ 17:20)  Source: .Urine Clean Catch (Midstream)  Final Report (10-15-20 @ 21:45):    <10,000 CFU/mL Normal Urogenital Meggan    Culture - Blood (collected 10-14-20 @ 12:15)  Source: .Blood Blood  Preliminary Report (10-15-20 @ 23:01):    No growth to date.    Culture - Blood (collected 10-14-20 @ 12:15)  Source: .Blood Blood  Preliminary Report (10-15-20 @ 23:01):    No growth to date.            INFECTIOUS DISEASES TESTING  Rapid RVP Result: NotDetec (10-14-20 @ 12:39)  COVID-19 PCR: NotDetec (10-02-20 @ 07:46)      INFLAMMATORY MARKERS      RADIOLOGY & ADDITIONAL TESTS:  I have personally reviewed the last available Chest xray  CXR  Xray Chest 1 View AP/PA:   EXAM:  XR CHEST FRONTAL 1V            PROCEDURE DATE:  10/14/2020            INTERPRETATION:  Clinical History / Reason for exam: Fever    Comparison : Chest radiograph 10/11/2020.    Technique/Positioning: Single AP chest radiograph.    Findings:    Support devices: None.    Cardiac/mediastinum/hilum: Unremarkable.    Lung parenchyma/Pleura: Within normal limits.    Skeleton/soft tissues: Unremarkable.    Impression:    No focal consolidation.                JUANCARLOS GUAJARDO M.D., ATTENDING RADIOLOGIST  This document has been electronically signed. Oct 14 2020  2:33PM (10-14-20 @ 13:14)      CT      CARDIOLOGY TESTING  12 Lead ECG:   Ventricular Rate 106 BPM    Atrial Rate 106 BPM    P-R Interval 140 ms    QRS Duration 96 ms    Q-T Interval 340 ms    QTC Calculation(Bazett) 451 ms    P Axis 53 degrees    R Axis -4 degrees    T Axis 27 degrees    Diagnosis Line Sinus tachycardia  Otherwise normal ECG    Confirmed by FELICIA JACOME MD ([id number]) on 10/5/2020 10:35:32 PM (10-05-20 @ 21:16)  12 Lead ECG:   Systolic  mmHg    Diastolic BP 69 mmHg    Ventricular Rate 97 BPM    Atrial Rate 97 BPM    P-R Interval 144 ms    QRS Duration 100 ms    Q-T Interval 358 ms    QTC Calculation(Bazett) 454 ms    P Axis 68 degrees    R Axis -59 degrees    T Axis 42 degrees    Diagnosis Line Normal sinus rhythm  Left anterior fascicular block  Abnormal ECG    Confirmed by Omkar Franklin ([id number]) on 10/3/2020 2:27:15 PM (10-03-20 @ 12:21)      MEDICATIONS  cefepime   IVPB     cefepime   IVPB 2000 IV Intermittent every 12 hours  dextrose 5% + sodium chloride 0.45%. 1000 IV Continuous <Continuous>  melatonin 10 Oral at bedtime  multivitamin 1 Oral daily  pantoprazole    Tablet 40 Oral before breakfast  vancomycin  IVPB 1000 IV Intermittent every 12 hours  vancomycin  IVPB         WEIGHT  Weight (kg): 145.5 (10-14-20 @ 10:50)  Creatinine, Serum: 0.8 mg/dL (10-15-20 @ 10:53)      ANTIBIOTICS:  cefepime   IVPB      cefepime   IVPB 2000 milliGRAM(s) IV Intermittent every 12 hours  vancomycin  IVPB 1000 milliGRAM(s) IV Intermittent every 12 hours  vancomycin  IVPB          All available historical records have been reviewed       BINH BECKHAM  19y, Male  Allergy: No Known Allergies      LOS  2d    CHIEF COMPLAINT: POst op fever (15 Oct 2020 11:03)      INTERVAL EVENTS/HPI  - Febrile but feeling much better, decreased HA  - T(F): , Max: 103.1 (10-15-20 @ 18:38)  - Tolerating medication  - WBC Count: 16.51 (10-15-20 @ 10:53)  WBC Count: 18.04 (10-14-20 @ 12:15)  - Creatinine, Serum: 0.8 (10-15-20 @ 10:53)  Creatinine, Serum: 0.9 (10-14-20 @ 12:15)       ROS  General: Denies rigors, nightsweats  HEENT: Denies rhinorrhea, sore throat, eye pain  CV: Denies CP, palpitations  PULM: Denies wheezing, hemoptysis  GI: Denies hematemesis, hematochezia, melena  : Denies discharge, hematuria  MSK: Denies arthralgias, myalgias  SKIN: Denies rash, lesions  NEURO: Denies paresthesias, weakness  PSYCH: Denies depression, anxiety     VITALS:  T(F): 97.8, Max: 103.1 (10-15-20 @ 18:38)  HR: 78  BP: 118/69  RR: 18Vital Signs Last 24 Hrs  T(C): 36.6 (16 Oct 2020 05:10), Max: 39.5 (15 Oct 2020 18:38)  T(F): 97.8 (16 Oct 2020 05:10), Max: 103.1 (15 Oct 2020 18:38)  HR: 78 (16 Oct 2020 05:10) (78 - 94)  BP: 118/69 (16 Oct 2020 05:10) (118/69 - 151/67)  BP(mean): --  RR: 18 (16 Oct 2020 05:10) (18 - 18)  SpO2: --    PHYSICAL EXAM:  Gen: NAD, resting in bed  HEENT: Normocephalic, atraumatic  Neck: supple, no lymphadenopathy, decreased neck stiffness  CV: Regular rate & regular rhythm  Lungs: decreased BS at bases, no fremitus  Abdomen: Soft, BS present  Ext: Warm, well perfused  Neuro: non focal, awake  Skin: no rash, no erythema  Lines: no phlebitis     FH: Non-contributory  Social Hx: Non-contributory    TESTS & MEASUREMENTS:                        12.6   16.51 )-----------( 283      ( 15 Oct 2020 10:53 )             37.9     10-15    134<L>  |  99  |  12  ----------------------------<  107<H>  4.4   |  21  |  0.8    Ca    9.3      15 Oct 2020 10:53    TPro  6.9  /  Alb  4.0  /  TBili  0.6  /  DBili  x   /  AST  21  /  ALT  35  /  AlkPhos  61  10-15    eGFR if Non African American: 130 mL/min/1.73M2 (10-15-20 @ 10:53)  eGFR if African American: 150 mL/min/1.73M2 (10-15-20 @ 10:53)    LIVER FUNCTIONS - ( 15 Oct 2020 10:53 )  Alb: 4.0 g/dL / Pro: 6.9 g/dL / ALK PHOS: 61 U/L / ALT: 35 U/L / AST: 21 U/L / GGT: x           Urinalysis Basic - ( 14 Oct 2020 17:20 )    Color: Yellow / Appearance: Clear / S.027 / pH: x  Gluc: x / Ketone: Negative  / Bili: Negative / Urobili: <2 mg/dL   Blood: x / Protein: 30 mg/dL / Nitrite: Negative   Leuk Esterase: Negative / RBC: 2 /HPF / WBC 1 /HPF   Sq Epi: x / Non Sq Epi: 1 /HPF / Bacteria: Negative        Culture - Urine (collected 10-14-20 @ 17:20)  Source: .Urine Clean Catch (Midstream)  Final Report (10-15-20 @ 21:45):    <10,000 CFU/mL Normal Urogenital Meggan    Culture - Blood (collected 10-14-20 @ 12:15)  Source: .Blood Blood  Preliminary Report (10-15-20 @ 23:01):    No growth to date.    Culture - Blood (collected 10-14-20 @ 12:15)  Source: .Blood Blood  Preliminary Report (10-15-20 @ 23:01):    No growth to date.            INFECTIOUS DISEASES TESTING  Rapid RVP Result: NotDetec (10-14-20 @ 12:39)  COVID-19 PCR: NotDetec (10-02-20 @ 07:46)      INFLAMMATORY MARKERS      RADIOLOGY & ADDITIONAL TESTS:  I have personally reviewed the last available Chest xray  CXR  Xray Chest 1 View AP/PA:   EXAM:  XR CHEST FRONTAL 1V            PROCEDURE DATE:  10/14/2020            INTERPRETATION:  Clinical History / Reason for exam: Fever    Comparison : Chest radiograph 10/11/2020.    Technique/Positioning: Single AP chest radiograph.    Findings:    Support devices: None.    Cardiac/mediastinum/hilum: Unremarkable.    Lung parenchyma/Pleura: Within normal limits.    Skeleton/soft tissues: Unremarkable.    Impression:    No focal consolidation.                JUANCARLOS GUAJARDO M.D., ATTENDING RADIOLOGIST  This document has been electronically signed. Oct 14 2020  2:33PM (10-14-20 @ 13:14)      CT      CARDIOLOGY TESTING  12 Lead ECG:   Ventricular Rate 106 BPM    Atrial Rate 106 BPM    P-R Interval 140 ms    QRS Duration 96 ms    Q-T Interval 340 ms    QTC Calculation(Bazett) 451 ms    P Axis 53 degrees    R Axis -4 degrees    T Axis 27 degrees    Diagnosis Line Sinus tachycardia  Otherwise normal ECG    Confirmed by FELICIA JACOME MD ([id number]) on 10/5/2020 10:35:32 PM (10-05-20 @ 21:16)  12 Lead ECG:   Systolic  mmHg    Diastolic BP 69 mmHg    Ventricular Rate 97 BPM    Atrial Rate 97 BPM    P-R Interval 144 ms    QRS Duration 100 ms    Q-T Interval 358 ms    QTC Calculation(Bazett) 454 ms    P Axis 68 degrees    R Axis -59 degrees    T Axis 42 degrees    Diagnosis Line Normal sinus rhythm  Left anterior fascicular block  Abnormal ECG    Confirmed by Omkar Franklin ([id number]) on 10/3/2020 2:27:15 PM (10-03-20 @ 12:21)      MEDICATIONS  cefepime   IVPB     cefepime   IVPB 2000 IV Intermittent every 12 hours  dextrose 5% + sodium chloride 0.45%. 1000 IV Continuous <Continuous>  melatonin 10 Oral at bedtime  multivitamin 1 Oral daily  pantoprazole    Tablet 40 Oral before breakfast  vancomycin  IVPB 1000 IV Intermittent every 12 hours  vancomycin  IVPB         WEIGHT  Weight (kg): 145.5 (10-14-20 @ 10:50)  Creatinine, Serum: 0.8 mg/dL (10-15-20 @ 10:53)      ANTIBIOTICS:  cefepime   IVPB      cefepime   IVPB 2000 milliGRAM(s) IV Intermittent every 12 hours  vancomycin  IVPB 1000 milliGRAM(s) IV Intermittent every 12 hours  vancomycin  IVPB          All available historical records have been reviewed

## 2020-10-16 NOTE — PROCEDURE NOTE - NSPOSTPRCRAD_GEN_A_CORE
line in appropriate postion/chest radiograph/fluoroscopy/line adjusted to depth of insertion/ultrasound/depth of insertion

## 2020-10-16 NOTE — PROGRESS NOTE ADULT - ASSESSMENT
ASSESSMENT  19 year old male with hx of L5-S1 Transforaminal lumbar interbody fusion on 9/25 pt went back to the OR on 10/3 for repair of CSF leak and placement of lumbar drain.  Pt presented to the ER for history of fever that went up to 102.     IMPRESSION  #Suspect post-op meningitis     10/14 BCX NGTD     Exam with meningeal signs    Exploration, wound, lumbar region, with repair of dural defect if indicated, for CSF leak 03-Oct-2020 12:36:13 left L5-S1    Fusion, spine, lumbar, TLIF 25-Sep-2020 16:37:26 Left L5-S1, with bilateral L4-S1 pedicle fusion "Grade II spondylolisthesis at L5-S1, with severe degenerative disc disease, facet arthropathy and neural compression.  CSF durotomy incurred during decompression, which was controlled and repaired by Duragen on-lay matrix."    < from: CT Lumbar Spine No Cont (10.02.20 @ 17:15) >Bilateral dorsal subcutaneous fluid collection centered at about L3/L4, measuring up to 5.5 cm on the right side and 8.5 cm on the left side. Additional edema/ill-defined fluid collection within the left dorsal paraspinal musculature measuring up to 7 cm. Since the clinical concern is for CSF leak, a CSF leak is not excluded.  #Sepsis on admission T>101F, Pulse>90,  WBC 18  #Morbid Obesity BMI (kg/m2): 37.1      RECOMMENDATIONS  - Discussed case with Dr. Reyes, pt high risk and difficult access for LP  - Will treat empirically for post-op meningitis  - Cefepime 2g q8h IV  - Vanc 1 q8h IV   - Please check vanc trough 30 min prior to 4th dose   - SEND ESR, CRP  - Will need PICC and 14-21 days of IV ABX  - Per NSGY, no role for repeat imaging  This is a preliminary incomplete pended note, all final recommendations to follow after interview and examination of the patient.     If any questions, please call or send a message on [employer] Teams  Spectra [id number] ASSESSMENT  19 year old male with hx of L5-S1 Transforaminal lumbar interbody fusion on 9/25 pt went back to the OR on 10/3 for repair of CSF leak and placement of lumbar drain.  Pt presented to the ER for history of fever that went up to 102.     IMPRESSION  #Suspect post-op meningitis     10/14 BCX NGTD     Exam with meningeal signs    Exploration, wound, lumbar region, with repair of dural defect if indicated, for CSF leak 03-Oct-2020 12:36:13 left L5-S1    Fusion, spine, lumbar, TLIF 25-Sep-2020 16:37:26 Left L5-S1, with bilateral L4-S1 pedicle fusion "Grade II spondylolisthesis at L5-S1, with severe degenerative disc disease, facet arthropathy and neural compression.  CSF durotomy incurred during decompression, which was controlled and repaired by Duragen on-lay matrix."    < from: CT Lumbar Spine No Cont (10.02.20 @ 17:15) >Bilateral dorsal subcutaneous fluid collection centered at about L3/L4, measuring up to 5.5 cm on the right side and 8.5 cm on the left side. Additional edema/ill-defined fluid collection within the left dorsal paraspinal musculature measuring up to 7 cm. Since the clinical concern is for CSF leak, a CSF leak is not excluded.  #Sepsis on admission T>101F, Pulse>90,  WBC 18  #Morbid Obesity BMI (kg/m2): 37.1      RECOMMENDATIONS  - Discussed case with Dr. Reyes, pt high risk and difficult access for LP  - Will treat empirically for post-op meningitis  - Cefepime 2g q8h IV  - Vanc 1 q8h IV   - Please check vanc trough 30 min prior to 4th dose   - SEND ESR, CRP  - Will need PICC and 14-21 days of IV ABX pending clinical improvement  - Per NSGY, no role for repeat imaging  - Weekly CBC, CMP, ESR/CRP, Vanc trough  - ID follow-up with Dr. Huseyin Rojo for Telehealth 10/27. We will call the patient between 10:30-6:30      [id number]        [phone]       Fax [phone]     If any questions, please call or send a message on [employer] Teams  Spectra [id number]

## 2020-10-17 LAB
ANION GAP SERPL CALC-SCNC: 12 MMOL/L — SIGNIFICANT CHANGE UP (ref 7–14)
BUN SERPL-MCNC: 14 MG/DL — SIGNIFICANT CHANGE UP (ref 10–20)
CALCIUM SERPL-MCNC: 8.8 MG/DL — SIGNIFICANT CHANGE UP (ref 8.5–10.1)
CHLORIDE SERPL-SCNC: 100 MMOL/L — SIGNIFICANT CHANGE UP (ref 98–110)
CO2 SERPL-SCNC: 24 MMOL/L — SIGNIFICANT CHANGE UP (ref 17–32)
CREAT SERPL-MCNC: 0.8 MG/DL — SIGNIFICANT CHANGE UP (ref 0.3–1)
GLUCOSE SERPL-MCNC: 125 MG/DL — HIGH (ref 70–99)
HCT VFR BLD CALC: 38.3 % — LOW (ref 42–52)
HGB BLD-MCNC: 12.7 G/DL — LOW (ref 14–18)
MCHC RBC-ENTMCNC: 29.4 PG — SIGNIFICANT CHANGE UP (ref 27–31)
MCHC RBC-ENTMCNC: 33.2 G/DL — SIGNIFICANT CHANGE UP (ref 32–37)
MCV RBC AUTO: 88.7 FL — SIGNIFICANT CHANGE UP (ref 80–94)
NRBC # BLD: 0 /100 WBCS — SIGNIFICANT CHANGE UP (ref 0–0)
PLATELET # BLD AUTO: 288 K/UL — SIGNIFICANT CHANGE UP (ref 130–400)
POTASSIUM SERPL-MCNC: 4.2 MMOL/L — SIGNIFICANT CHANGE UP (ref 3.5–5)
POTASSIUM SERPL-SCNC: 4.2 MMOL/L — SIGNIFICANT CHANGE UP (ref 3.5–5)
RBC # BLD: 4.32 M/UL — LOW (ref 4.7–6.1)
RBC # FLD: 13 % — SIGNIFICANT CHANGE UP (ref 11.5–14.5)
SODIUM SERPL-SCNC: 136 MMOL/L — SIGNIFICANT CHANGE UP (ref 135–146)
VANCOMYCIN TROUGH SERPL-MCNC: 4.7 UG/ML — LOW (ref 5–10)
WBC # BLD: 11.15 K/UL — HIGH (ref 4.8–10.8)
WBC # FLD AUTO: 11.15 K/UL — HIGH (ref 4.8–10.8)

## 2020-10-17 RX ORDER — VANCOMYCIN HCL 1 G
1500 VIAL (EA) INTRAVENOUS EVERY 8 HOURS
Refills: 0 | Status: DISCONTINUED | OUTPATIENT
Start: 2020-10-17 | End: 2020-10-20

## 2020-10-17 RX ADMIN — OXYCODONE AND ACETAMINOPHEN 1 TABLET(S): 5; 325 TABLET ORAL at 19:32

## 2020-10-17 RX ADMIN — Medication 1 TABLET(S): at 12:19

## 2020-10-17 RX ADMIN — CEFEPIME 100 MILLIGRAM(S): 1 INJECTION, POWDER, FOR SOLUTION INTRAMUSCULAR; INTRAVENOUS at 15:17

## 2020-10-17 RX ADMIN — CEFEPIME 100 MILLIGRAM(S): 1 INJECTION, POWDER, FOR SOLUTION INTRAMUSCULAR; INTRAVENOUS at 21:21

## 2020-10-17 RX ADMIN — Medication 650 MILLIGRAM(S): at 01:20

## 2020-10-17 RX ADMIN — PANTOPRAZOLE SODIUM 40 MILLIGRAM(S): 20 TABLET, DELAYED RELEASE ORAL at 06:17

## 2020-10-17 RX ADMIN — Medication 650 MILLIGRAM(S): at 05:46

## 2020-10-17 RX ADMIN — HEPARIN SODIUM 5000 UNIT(S): 5000 INJECTION INTRAVENOUS; SUBCUTANEOUS at 21:22

## 2020-10-17 RX ADMIN — OXYCODONE AND ACETAMINOPHEN 2 TABLET(S): 5; 325 TABLET ORAL at 21:22

## 2020-10-17 RX ADMIN — Medication 10 MILLIGRAM(S): at 21:21

## 2020-10-17 RX ADMIN — Medication 250 MILLIGRAM(S): at 15:17

## 2020-10-17 RX ADMIN — HEPARIN SODIUM 5000 UNIT(S): 5000 INJECTION INTRAVENOUS; SUBCUTANEOUS at 13:57

## 2020-10-17 RX ADMIN — METHOCARBAMOL 750 MILLIGRAM(S): 500 TABLET, FILM COATED ORAL at 00:46

## 2020-10-17 RX ADMIN — Medication 300 MILLIGRAM(S): at 21:21

## 2020-10-17 RX ADMIN — OXYCODONE AND ACETAMINOPHEN 1 TABLET(S): 5; 325 TABLET ORAL at 06:15

## 2020-10-17 RX ADMIN — Medication 650 MILLIGRAM(S): at 06:17

## 2020-10-17 RX ADMIN — Medication 250 MILLIGRAM(S): at 05:47

## 2020-10-17 RX ADMIN — OXYCODONE AND ACETAMINOPHEN 2 TABLET(S): 5; 325 TABLET ORAL at 06:47

## 2020-10-17 RX ADMIN — HEPARIN SODIUM 5000 UNIT(S): 5000 INJECTION INTRAVENOUS; SUBCUTANEOUS at 05:48

## 2020-10-17 RX ADMIN — CEFEPIME 100 MILLIGRAM(S): 1 INJECTION, POWDER, FOR SOLUTION INTRAMUSCULAR; INTRAVENOUS at 05:47

## 2020-10-17 NOTE — PROGRESS NOTE ADULT - SUBJECTIVE AND OBJECTIVE BOX
HD # 3    Fevers    Pt seen and examined at bedside. Pt sts he had back pain last pm. better now. Sts overall feeling much better than when he was admitted. Sts he had BM and denies and "pop" or Headache afterwards or currently.     Vital Signs Last 24 Hrs  T(C): 37.1 (17 Oct 2020 07:45), Max: 38.4 (17 Oct 2020 06:39)  T(F): 98.8 (17 Oct 2020 07:45), Max: 101.1 (17 Oct 2020 06:39)  HR: 97 (17 Oct 2020 06:39) (88 - 104)  BP: 140/77 (17 Oct 2020 06:39) (133/61 - 140/77)  BP(mean): --  RR: 19 (17 Oct 2020 06:39) (18 - 19)  SpO2: --    PHYSICAL EXAM:  Dressing C/D/I - no drainage  MUÑOZ  + sensation    MEDICATIONS:  Antibiotics:  cefepime   IVPB 2000 milliGRAM(s) IV Intermittent every 8 hours  vancomycin  IVPB 1000 milliGRAM(s) IV Intermittent every 8 hours    Neuro:  acetaminophen   Tablet .. 650 milliGRAM(s) Oral every 6 hours PRN  melatonin 10 milliGRAM(s) Oral at bedtime  methocarbamol 750 milliGRAM(s) Oral every 6 hours PRN  ondansetron Injectable 4 milliGRAM(s) IV Push every 6 hours PRN  oxycodone    5 mG/acetaminophen 325 mG 1 Tablet(s) Oral every 6 hours PRN  oxycodone    5 mG/acetaminophen 325 mG 2 Tablet(s) Oral every 6 hours PRN    Anticoagulation:  heparin   Injectable 5000 Unit(s) SubCutaneous every 8 hours    OTHER:  bisacodyl 5 milliGRAM(s) Oral every 12 hours PRN  lactobacillus acidophilus 1 Tablet(s) Oral daily  magnesium citrate Oral Solution 1 Bottle Oral once PRN  pantoprazole    Tablet 40 milliGRAM(s) Oral before breakfast  polyethylene glycol 3350 17 Gram(s) Oral daily  senna 2 Tablet(s) Oral at bedtime PRN    IVF:  dextrose 5% + sodium chloride 0.45%. 1000 milliLiter(s) IV Continuous <Continuous>  multivitamin 1 Tablet(s) Oral daily        LABS:                        12.7   11.15 )-----------( 288      ( 17 Oct 2020 04:30 )             38.3     10-17    136  |  100  |  14  ----------------------------<  125<H>  4.2   |  24  |  0.8    Ca    8.8      17 Oct 2020 04:30    TPro  6.9  /  Alb  4.0  /  TBili  0.6  /  DBili  x   /  AST  21  /  ALT  35  /  AlkPhos  61  10-15    Assessment:  As above    Plan:  ID Follow up  Continue IV antibiotics  F/U Cultures - prelim neg.

## 2020-10-17 NOTE — PROGRESS NOTE ADULT - SUBJECTIVE AND OBJECTIVE BOX
BINH BECKHAM  19y, Male  Allergy: No Known Allergies      LOS  3d    CHIEF COMPLAINT: POst op fever (16 Oct 2020 08:21)      INTERVAL EVENTS/HPI  - No acute events overnight  - remains febrile, but WBC improving  - T(F): , Max: 101.1 (10-17-20 @ 06:39)  - Denies any worsening symptoms  - Tolerating medication  - WBC Count: 11.15 (10-17-20 @ 04:30)  WBC Count: 16.51 (10-15-20 @ 10:53)     - Creatinine, Serum: 0.8 (10-17-20 @ 04:30)  Creatinine, Serum: 0.8 (10-15-20 @ 10:53)       ROS  General: Denies rigors, nightsweats  HEENT: Denies headache, rhinorrhea, sore throat, eye pain  CV: Denies CP, palpitations  PULM: Denies wheezing, hemoptysis  GI: Denies hematemesis, hematochezia, melena  : Denies discharge, hematuria  MSK: Denies arthralgias, myalgias  SKIN: Denies rash, lesions  NEURO: Denies paresthesias, weakness  PSYCH: Denies depression, anxiety    VITALS:  T(F): 98.8, Max: 101.1 (10-17-20 @ 06:39)  HR: 97  BP: 140/77  RR: 19Vital Signs Last 24 Hrs  T(C): 37.1 (17 Oct 2020 07:45), Max: 38.4 (17 Oct 2020 06:39)  T(F): 98.8 (17 Oct 2020 07:45), Max: 101.1 (17 Oct 2020 06:39)  HR: 97 (17 Oct 2020 06:39) (88 - 104)  BP: 140/77 (17 Oct 2020 06:39) (133/61 - 140/77)  BP(mean): --  RR: 19 (17 Oct 2020 06:39) (18 - 19)  SpO2: --    PHYSICAL EXAM:  Gen: NAD, resting in bed  HEENT: Normocephalic, atraumatic  Neck: supple, no lymphadenopathy  CV: Regular rate & regular rhythm  Lungs: decreased BS at bases, no fremitus  Abdomen: Soft, BS present  Ext: Warm, well perfused  Neuro: non focal, awake  Skin: no rash, no erythema  Lines: no phlebitis    FH: Non-contributory  Social Hx: Non-contributory    TESTS & MEASUREMENTS:                        12.7   11.15 )-----------( 288      ( 17 Oct 2020 04:30 )             38.3     10-17    136  |  100  |  14  ----------------------------<  125<H>  4.2   |  24  |  0.8    Ca    8.8      17 Oct 2020 04:30    TPro  6.9  /  Alb  4.0  /  TBili  0.6  /  DBili  x   /  AST  21  /  ALT  35  /  AlkPhos  61  10-15    eGFR if Non African American: 130 mL/min/1.73M2 (10-17-20 @ 04:30)  eGFR if African American: 150 mL/min/1.73M2 (10-17-20 @ 04:30)    LIVER FUNCTIONS - ( 15 Oct 2020 10:53 )  Alb: 4.0 g/dL / Pro: 6.9 g/dL / ALK PHOS: 61 U/L / ALT: 35 U/L / AST: 21 U/L / GGT: x               Culture - Blood (collected 10-15-20 @ 10:53)  Source: .Blood Blood-Venous  Preliminary Report (10-16-20 @ 22:02):    No growth to date.    Culture - Urine (collected 10-14-20 @ 17:20)  Source: .Urine Clean Catch (Midstream)  Final Report (10-15-20 @ 21:45):    <10,000 CFU/mL Normal Urogenital Meggan    Culture - Blood (collected 10-14-20 @ 12:15)  Source: .Blood Blood  Preliminary Report (10-15-20 @ 23:01):    No growth to date.    Culture - Blood (collected 10-14-20 @ 12:15)  Source: .Blood Blood  Preliminary Report (10-15-20 @ 23:01):    No growth to date.            INFECTIOUS DISEASES TESTING  Rapid RVP Result: NotDetec (10-14-20 @ 12:39)  COVID-19 PCR: NotDetec (10-02-20 @ 07:46)      INFLAMMATORY MARKERS      RADIOLOGY & ADDITIONAL TESTS:  I have personally reviewed the last available Chest xray  CXR  Xray Chest 1 View AP/PA:   EXAM:  XR CHEST FRONTAL 1V            PROCEDURE DATE:  10/14/2020            INTERPRETATION:  Clinical History / Reason for exam: Fever    Comparison : Chest radiograph 10/11/2020.    Technique/Positioning: Single AP chest radiograph.    Findings:    Support devices: None.    Cardiac/mediastinum/hilum: Unremarkable.    Lung parenchyma/Pleura: Within normal limits.    Skeleton/soft tissues: Unremarkable.    Impression:    No focal consolidation.                JUANCARLOS GUAJARDO M.D., ATTENDING RADIOLOGIST  This document has been electronically signed. Oct 14 2020  2:33PM (10-14-20 @ 13:14)      CT      CARDIOLOGY TESTING  12 Lead ECG:   Ventricular Rate 106 BPM    Atrial Rate 106 BPM    P-R Interval 140 ms    QRS Duration 96 ms    Q-T Interval 340 ms    QTC Calculation(Bazett) 451 ms    P Axis 53 degrees    R Axis -4 degrees    T Axis 27 degrees    Diagnosis Line Sinus tachycardia  Otherwise normal ECG    Confirmed by FELICIA JACOME MD ([id number]) on 10/5/2020 10:35:32 PM (10-05-20 @ 21:16)  12 Lead ECG:   Systolic  mmHg    Diastolic BP 69 mmHg    Ventricular Rate 97 BPM    Atrial Rate 97 BPM    P-R Interval 144 ms    QRS Duration 100 ms    Q-T Interval 358 ms    QTC Calculation(Bazett) 454 ms    P Axis 68 degrees    R Axis -59 degrees    T Axis 42 degrees    Diagnosis Line Normal sinus rhythm  Left anterior fascicular block  Abnormal ECG    Confirmed by Omkar Franklin ([id number]) on 10/3/2020 2:27:15 PM (10-03-20 @ 12:21)      MEDICATIONS  cefepime   IVPB 2000 IV Intermittent every 8 hours  dextrose 5% + sodium chloride 0.45%. 1000 IV Continuous <Continuous>  heparin   Injectable 5000 SubCutaneous every 8 hours  lactobacillus acidophilus 1 Oral daily  melatonin 10 Oral at bedtime  multivitamin 1 Oral daily  pantoprazole    Tablet 40 Oral before breakfast  polyethylene glycol 3350 17 Oral daily  vancomycin  IVPB 1000 IV Intermittent every 8 hours      WEIGHT  Weight (kg): 145.5 (10-14-20 @ 10:50)  Creatinine, Serum: 0.8 mg/dL (10-17-20 @ 04:30)      ANTIBIOTICS:  cefepime   IVPB 2000 milliGRAM(s) IV Intermittent every 8 hours  vancomycin  IVPB 1000 milliGRAM(s) IV Intermittent every 8 hours      All available historical records have been reviewed       BINH BECKHAM  19y, Male  Allergy: No Known Allergies      LOS  3d    CHIEF COMPLAINT: POst op fever (16 Oct 2020 08:21)      INTERVAL EVENTS/HPI  - No acute events overnight  - remains febrile, but WBC improving  - neck stiffness and headaches improved as well   - T(F): , Max: 101.1 (10-17-20 @ 06:39)  - Denies any worsening symptoms  - Tolerating medication  - WBC Count: 11.15 (10-17-20 @ 04:30)  WBC Count: 16.51 (10-15-20 @ 10:53)     - Creatinine, Serum: 0.8 (10-17-20 @ 04:30)  Creatinine, Serum: 0.8 (10-15-20 @ 10:53)       ROS  General: Denies rigors, nightsweats  HEENT: Denies headache, rhinorrhea, sore throat, eye pain  CV: Denies CP, palpitations  PULM: Denies wheezing, hemoptysis  GI: Denies hematemesis, hematochezia, melena  : Denies discharge, hematuria  MSK: Denies arthralgias, myalgias  SKIN: Denies rash, lesions  NEURO: Denies paresthesias, weakness  PSYCH: Denies depression, anxiety    VITALS:  T(F): 98.8, Max: 101.1 (10-17-20 @ 06:39)  HR: 97  BP: 140/77  RR: 19Vital Signs Last 24 Hrs  T(C): 37.1 (17 Oct 2020 07:45), Max: 38.4 (17 Oct 2020 06:39)  T(F): 98.8 (17 Oct 2020 07:45), Max: 101.1 (17 Oct 2020 06:39)  HR: 97 (17 Oct 2020 06:39) (88 - 104)  BP: 140/77 (17 Oct 2020 06:39) (133/61 - 140/77)  BP(mean): --  RR: 19 (17 Oct 2020 06:39) (18 - 19)  SpO2: --    PHYSICAL EXAM:  Gen: NAD, resting in bed  HEENT: Normocephalic, atraumatic  Neck: supple, no lymphadenopathy  CV: Regular rate & regular rhythm  Lungs: decreased BS at bases, no fremitus  Abdomen: Soft, BS present  Ext: Warm, well perfused  Neuro: non focal, awake  Skin: no rash, no erythema  Lines: no phlebitis    FH: Non-contributory  Social Hx: Non-contributory    TESTS & MEASUREMENTS:                        12.7   11.15 )-----------( 288      ( 17 Oct 2020 04:30 )             38.3     10-17    136  |  100  |  14  ----------------------------<  125<H>  4.2   |  24  |  0.8    Ca    8.8      17 Oct 2020 04:30    TPro  6.9  /  Alb  4.0  /  TBili  0.6  /  DBili  x   /  AST  21  /  ALT  35  /  AlkPhos  61  10-15    eGFR if Non African American: 130 mL/min/1.73M2 (10-17-20 @ 04:30)  eGFR if African American: 150 mL/min/1.73M2 (10-17-20 @ 04:30)    LIVER FUNCTIONS - ( 15 Oct 2020 10:53 )  Alb: 4.0 g/dL / Pro: 6.9 g/dL / ALK PHOS: 61 U/L / ALT: 35 U/L / AST: 21 U/L / GGT: x               Culture - Blood (collected 10-15-20 @ 10:53)  Source: .Blood Blood-Venous  Preliminary Report (10-16-20 @ 22:02):    No growth to date.    Culture - Urine (collected 10-14-20 @ 17:20)  Source: .Urine Clean Catch (Midstream)  Final Report (10-15-20 @ 21:45):    <10,000 CFU/mL Normal Urogenital Meggan    Culture - Blood (collected 10-14-20 @ 12:15)  Source: .Blood Blood  Preliminary Report (10-15-20 @ 23:01):    No growth to date.    Culture - Blood (collected 10-14-20 @ 12:15)  Source: .Blood Blood  Preliminary Report (10-15-20 @ 23:01):    No growth to date.            INFECTIOUS DISEASES TESTING  Rapid RVP Result: NotDetec (10-14-20 @ 12:39)  COVID-19 PCR: NotDetec (10-02-20 @ 07:46)      INFLAMMATORY MARKERS      RADIOLOGY & ADDITIONAL TESTS:  I have personally reviewed the last available Chest xray  CXR  Xray Chest 1 View AP/PA:   EXAM:  XR CHEST FRONTAL 1V            PROCEDURE DATE:  10/14/2020            INTERPRETATION:  Clinical History / Reason for exam: Fever    Comparison : Chest radiograph 10/11/2020.    Technique/Positioning: Single AP chest radiograph.    Findings:    Support devices: None.    Cardiac/mediastinum/hilum: Unremarkable.    Lung parenchyma/Pleura: Within normal limits.    Skeleton/soft tissues: Unremarkable.    Impression:    No focal consolidation.                JUANCARLOS GUAJARDO M.D., ATTENDING RADIOLOGIST  This document has been electronically signed. Oct 14 2020  2:33PM (10-14-20 @ 13:14)      CT      CARDIOLOGY TESTING  12 Lead ECG:   Ventricular Rate 106 BPM    Atrial Rate 106 BPM    P-R Interval 140 ms    QRS Duration 96 ms    Q-T Interval 340 ms    QTC Calculation(Bazett) 451 ms    P Axis 53 degrees    R Axis -4 degrees    T Axis 27 degrees    Diagnosis Line Sinus tachycardia  Otherwise normal ECG    Confirmed by FELICIA JACOME MD ([id number]) on 10/5/2020 10:35:32 PM (10-05-20 @ 21:16)  12 Lead ECG:   Systolic  mmHg    Diastolic BP 69 mmHg    Ventricular Rate 97 BPM    Atrial Rate 97 BPM    P-R Interval 144 ms    QRS Duration 100 ms    Q-T Interval 358 ms    QTC Calculation(Bazett) 454 ms    P Axis 68 degrees    R Axis -59 degrees    T Axis 42 degrees    Diagnosis Line Normal sinus rhythm  Left anterior fascicular block  Abnormal ECG    Confirmed by Omkar Franklin ([id number]) on 10/3/2020 2:27:15 PM (10-03-20 @ 12:21)      MEDICATIONS  cefepime   IVPB 2000 IV Intermittent every 8 hours  dextrose 5% + sodium chloride 0.45%. 1000 IV Continuous <Continuous>  heparin   Injectable 5000 SubCutaneous every 8 hours  lactobacillus acidophilus 1 Oral daily  melatonin 10 Oral at bedtime  multivitamin 1 Oral daily  pantoprazole    Tablet 40 Oral before breakfast  polyethylene glycol 3350 17 Oral daily  vancomycin  IVPB 1000 IV Intermittent every 8 hours      WEIGHT  Weight (kg): 145.5 (10-14-20 @ 10:50)  Creatinine, Serum: 0.8 mg/dL (10-17-20 @ 04:30)      ANTIBIOTICS:  cefepime   IVPB 2000 milliGRAM(s) IV Intermittent every 8 hours  vancomycin  IVPB 1000 milliGRAM(s) IV Intermittent every 8 hours      All available historical records have been reviewed

## 2020-10-17 NOTE — PROGRESS NOTE ADULT - ASSESSMENT
ASSESSMENT  19 year old male with hx of L5-S1 Transforaminal lumbar interbody fusion on 9/25 pt went back to the OR on 10/3 for repair of CSF leak and placement of lumbar drain.  Pt presented to the ER for history of fever that went up to 102.     IMPRESSION  #Suspect post-op meningitis     Exam with meningeal signs    Exploration, wound, lumbar region, with repair of dural defect if indicated, for CSF leak 03-Oct-2020 12:36:13 left L5-S1    Fusion, spine, lumbar, TLIF 25-Sep-2020 16:37:26 Left L5-S1, with bilateral L4-S1 pedicle fusion "Grade II spondylolisthesis at L5-S1, with severe degenerative disc disease, facet arthropathy and neural compression.  CSF durotomy incurred during decompression, which was controlled and repaired by Duragen on-lay matrix."    < from: CT Lumbar Spine No Cont (10.02.20 @ 17:15) >Bilateral dorsal subcutaneous fluid collection centered at about L3/L4, measuring up to 5.5 cm on the right side and 8.5 cm on the left side. Additional edema/ill-defined fluid collection within the left dorsal paraspinal musculature measuring up to 7 cm. Since the clinical concern is for CSF leak, a CSF leak is not excluded.    Patient at high risk and difficult access for LP    Blood Cx 10/15 NG    #Sepsis on admission T>101F, Pulse>90,  WBC 18  #Morbid Obesity BMI (kg/m2): 37.1      RECOMMENDATIONS  - Cefepime 2g q8h IV  - Vanc 1g q8h IV   - Please check vanc trough 30 min prior to next dose   - SEND ESR, CRP  - Will need PICC and 14-21 days of IV ABX  - Per NSGY, no role for repeat imaging  - Can discuss if his infusion company will do Vanc via midline, most will not    Please call or message on [employer] Teams if with any questions.  Spectra [phone]    This is a preliminary incomplete pended note, all final recommendations to follow after interview and examination of the patient.     ASSESSMENT  19 year old male with hx of L5-S1 Transforaminal lumbar interbody fusion on 9/25 pt went back to the OR on 10/3 for repair of CSF leak and placement of lumbar drain.  Pt presented to the ER for history of fever that went up to 102.     IMPRESSION  #Suspect post-op meningitis     Exam with meningeal signs    Exploration, wound, lumbar region, with repair of dural defect if indicated, for CSF leak 03-Oct-2020 12:36:13 left L5-S1    Fusion, spine, lumbar, TLIF 25-Sep-2020 16:37:26 Left L5-S1, with bilateral L4-S1 pedicle fusion "Grade II spondylolisthesis at L5-S1, with severe degenerative disc disease, facet arthropathy and neural compression.  CSF durotomy incurred during decompression, which was controlled and repaired by Duragen on-lay matrix."    < from: CT Lumbar Spine No Cont (10.02.20 @ 17:15) >Bilateral dorsal subcutaneous fluid collection centered at about L3/L4, measuring up to 5.5 cm on the right side and 8.5 cm on the left side. Additional edema/ill-defined fluid collection within the left dorsal paraspinal musculature measuring up to 7 cm. Since the clinical concern is for CSF leak, a CSF leak is not excluded.    Patient at high risk and difficult access for LP    Blood Cx 10/15 NG    #Sepsis on admission T>101F, Pulse>90,  WBC 18  #Morbid Obesity BMI (kg/m2): 37.1      RECOMMENDATIONS  - Cefepime 2g q8h IV  - Vanc 1g q8h IV   - Please check vanc trough 30 min prior to next dose, goal 15-20  - SEND ESR, CRP  - Will need PICC and 14-21 days of IV ABX  - Per NSGY, no role for repeat imaging  - Can discuss if his infusion company will do Vanc via midline, most will not    Please call or message on [employer] Teams if with any questions.  Spectra [phone]

## 2020-10-17 NOTE — PHYSICAL THERAPY INITIAL EVALUATION ADULT - CRITERIA FOR SKILLED THERAPEUTIC INTERVENTIONS
rehab potential/impairments found/risk reduction/prevention/therapy frequency/anticipated discharge recommendation/predicted duration of therapy intervention/functional limitations in following categories/anticipated equipment needs at discharge

## 2020-10-18 LAB
ANION GAP SERPL CALC-SCNC: 12 MMOL/L — SIGNIFICANT CHANGE UP (ref 7–14)
ANION GAP SERPL CALC-SCNC: 8 MMOL/L — SIGNIFICANT CHANGE UP (ref 7–14)
BUN SERPL-MCNC: 15 MG/DL — SIGNIFICANT CHANGE UP (ref 10–20)
BUN SERPL-MCNC: 16 MG/DL — SIGNIFICANT CHANGE UP (ref 10–20)
CALCIUM SERPL-MCNC: 9.4 MG/DL — SIGNIFICANT CHANGE UP (ref 8.5–10.1)
CALCIUM SERPL-MCNC: 9.6 MG/DL — SIGNIFICANT CHANGE UP (ref 8.5–10.1)
CHLORIDE SERPL-SCNC: 98 MMOL/L — SIGNIFICANT CHANGE UP (ref 98–110)
CHLORIDE SERPL-SCNC: 99 MMOL/L — SIGNIFICANT CHANGE UP (ref 98–110)
CO2 SERPL-SCNC: 25 MMOL/L — SIGNIFICANT CHANGE UP (ref 17–32)
CO2 SERPL-SCNC: 26 MMOL/L — SIGNIFICANT CHANGE UP (ref 17–32)
CREAT SERPL-MCNC: 0.7 MG/DL — SIGNIFICANT CHANGE UP (ref 0.3–1)
CREAT SERPL-MCNC: 0.8 MG/DL — SIGNIFICANT CHANGE UP (ref 0.3–1)
ERYTHROCYTE [SEDIMENTATION RATE] IN BLOOD: 42 MM/HR — HIGH (ref 0–10)
GLUCOSE SERPL-MCNC: 109 MG/DL — HIGH (ref 70–99)
GLUCOSE SERPL-MCNC: 140 MG/DL — HIGH (ref 70–99)
HCT VFR BLD CALC: 38.2 % — LOW (ref 42–52)
HGB BLD-MCNC: 12.5 G/DL — LOW (ref 14–18)
MCHC RBC-ENTMCNC: 28.8 PG — SIGNIFICANT CHANGE UP (ref 27–31)
MCHC RBC-ENTMCNC: 32.7 G/DL — SIGNIFICANT CHANGE UP (ref 32–37)
MCV RBC AUTO: 88 FL — SIGNIFICANT CHANGE UP (ref 80–94)
NRBC # BLD: 0 /100 WBCS — SIGNIFICANT CHANGE UP (ref 0–0)
PLATELET # BLD AUTO: 259 K/UL — SIGNIFICANT CHANGE UP (ref 130–400)
POTASSIUM SERPL-MCNC: 4.3 MMOL/L — SIGNIFICANT CHANGE UP (ref 3.5–5)
POTASSIUM SERPL-MCNC: 4.7 MMOL/L — SIGNIFICANT CHANGE UP (ref 3.5–5)
POTASSIUM SERPL-SCNC: 4.3 MMOL/L — SIGNIFICANT CHANGE UP (ref 3.5–5)
POTASSIUM SERPL-SCNC: 4.7 MMOL/L — SIGNIFICANT CHANGE UP (ref 3.5–5)
RBC # BLD: 4.34 M/UL — LOW (ref 4.7–6.1)
RBC # FLD: 13 % — SIGNIFICANT CHANGE UP (ref 11.5–14.5)
SODIUM SERPL-SCNC: 133 MMOL/L — LOW (ref 135–146)
SODIUM SERPL-SCNC: 135 MMOL/L — SIGNIFICANT CHANGE UP (ref 135–146)
VANCOMYCIN TROUGH SERPL-MCNC: 10.1 UG/ML — HIGH (ref 5–10)
WBC # BLD: 9.54 K/UL — SIGNIFICANT CHANGE UP (ref 4.8–10.8)
WBC # FLD AUTO: 9.54 K/UL — SIGNIFICANT CHANGE UP (ref 4.8–10.8)

## 2020-10-18 PROCEDURE — 99231 SBSQ HOSP IP/OBS SF/LOW 25: CPT

## 2020-10-18 RX ADMIN — OXYCODONE AND ACETAMINOPHEN 2 TABLET(S): 5; 325 TABLET ORAL at 06:40

## 2020-10-18 RX ADMIN — HEPARIN SODIUM 5000 UNIT(S): 5000 INJECTION INTRAVENOUS; SUBCUTANEOUS at 05:39

## 2020-10-18 RX ADMIN — HEPARIN SODIUM 5000 UNIT(S): 5000 INJECTION INTRAVENOUS; SUBCUTANEOUS at 21:35

## 2020-10-18 RX ADMIN — CEFEPIME 100 MILLIGRAM(S): 1 INJECTION, POWDER, FOR SOLUTION INTRAMUSCULAR; INTRAVENOUS at 13:43

## 2020-10-18 RX ADMIN — PANTOPRAZOLE SODIUM 40 MILLIGRAM(S): 20 TABLET, DELAYED RELEASE ORAL at 06:39

## 2020-10-18 RX ADMIN — ONDANSETRON 4 MILLIGRAM(S): 8 TABLET, FILM COATED ORAL at 08:22

## 2020-10-18 RX ADMIN — Medication 1 TABLET(S): at 11:12

## 2020-10-18 RX ADMIN — Medication 300 MILLIGRAM(S): at 05:40

## 2020-10-18 RX ADMIN — Medication 300 MILLIGRAM(S): at 13:43

## 2020-10-18 RX ADMIN — Medication 300 MILLIGRAM(S): at 22:16

## 2020-10-18 RX ADMIN — Medication 10 MILLIGRAM(S): at 21:35

## 2020-10-18 RX ADMIN — OXYCODONE AND ACETAMINOPHEN 2 TABLET(S): 5; 325 TABLET ORAL at 18:28

## 2020-10-18 RX ADMIN — CEFEPIME 100 MILLIGRAM(S): 1 INJECTION, POWDER, FOR SOLUTION INTRAMUSCULAR; INTRAVENOUS at 21:36

## 2020-10-18 RX ADMIN — OXYCODONE AND ACETAMINOPHEN 2 TABLET(S): 5; 325 TABLET ORAL at 04:22

## 2020-10-18 RX ADMIN — OXYCODONE AND ACETAMINOPHEN 2 TABLET(S): 5; 325 TABLET ORAL at 17:55

## 2020-10-18 RX ADMIN — CEFEPIME 100 MILLIGRAM(S): 1 INJECTION, POWDER, FOR SOLUTION INTRAMUSCULAR; INTRAVENOUS at 05:40

## 2020-10-18 RX ADMIN — HEPARIN SODIUM 5000 UNIT(S): 5000 INJECTION INTRAVENOUS; SUBCUTANEOUS at 13:43

## 2020-10-18 NOTE — PROGRESS NOTE ADULT - ASSESSMENT
ASSESSMENT  19 year old male with hx of L5-S1 Transforaminal lumbar interbody fusion on 9/25 pt went back to the OR on 10/3 for repair of CSF leak and placement of lumbar drain.  Pt presented to the ER for history of fever that went up to 102.     IMPRESSION  #Suspect post-op meningitis     Exam with meningeal signs    Exploration, wound, lumbar region, with repair of dural defect if indicated, for CSF leak 03-Oct-2020 12:36:13 left L5-S1    Fusion, spine, lumbar, TLIF 25-Sep-2020 16:37:26 Left L5-S1, with bilateral L4-S1 pedicle fusion "Grade II spondylolisthesis at L5-S1, with severe degenerative disc disease, facet arthropathy and neural compression.  CSF durotomy incurred during decompression, which was controlled and repaired by Duragen on-lay matrix."    < from: CT Lumbar Spine No Cont (10.02.20 @ 17:15) >Bilateral dorsal subcutaneous fluid collection centered at about L3/L4, measuring up to 5.5 cm on the right side and 8.5 cm on the left side. Additional edema/ill-defined fluid collection within the left dorsal paraspinal musculature measuring up to 7 cm. Since the clinical concern is for CSF leak, a CSF leak is not excluded.    Patient at high risk and difficult access for LP    Blood Cx 10/15 NG    Sedimentation Rate, Erythrocyte: 42 mm/Hr (10.18.20 @ 04:30)      #Sepsis on admission T>101F, Pulse>90,  WBC 18  #Morbid Obesity BMI (kg/m2): 37.1      RECOMMENDATIONS  - Cefepime 2g q8h IV  - vancomycin trough 4.7; ok to increase to vancomycin 1.5g q 8 hours; will need trough prior to 4th dose of this regimen   - follow-up CRP  - Will need PICC and 14-21 days of IV ABX  - Per NSGY, no role for repeat imaging  - Can discuss if his infusion company will do Vanc via midline, most will not    Please call or message on [employer] Teams if with any questions.  Spectra [phone]

## 2020-10-18 NOTE — PROGRESS NOTE ADULT - SUBJECTIVE AND OBJECTIVE BOX
BINH BECKHAM  19y, Male  Allergy: No Known Allergies      LOS  4d    CHIEF COMPLAINT: POst op fever (18 Oct 2020 11:27)      INTERVAL EVENTS/HPI  - No acute events overnight  - T(F): , Max: 101.2 (10-17-20 @ 20:45)  - still with fevers, but less frequent  - denies any headaches; stiffness is improved  - Denies any worsening symptoms  - Tolerating medication  - WBC Count: 9.54 (10-18-20 @ 04:30)  WBC Count: 11.15 (10-17-20 @ 04:30)     - Creatinine, Serum: 0.7 (10-18-20 @ 04:30)  Creatinine, Serum: 0.8 (10-17-20 @ 04:30)       ROS  General: Denies rigors, nightsweats  HEENT: Denies headache, rhinorrhea, sore throat, eye pain  CV: Denies CP, palpitations  PULM: Denies wheezing, hemoptysis  GI: Denies hematemesis, hematochezia, melena  : Denies discharge, hematuria  MSK: Denies arthralgias, myalgias  SKIN: Denies rash, lesions  NEURO: Denies paresthesias, weakness  PSYCH: Denies depression, anxiety    VITALS:  T(F): 98, Max: 101.2 (10-17-20 @ 20:45)  HR: 82  BP: 129/75  RR: 18Vital Signs Last 24 Hrs  T(C): 36.7 (18 Oct 2020 13:27), Max: 38.4 (17 Oct 2020 20:45)  T(F): 98 (18 Oct 2020 13:27), Max: 101.2 (17 Oct 2020 20:45)  HR: 82 (18 Oct 2020 13:27) (82 - 100)  BP: 129/75 (18 Oct 2020 13:27) (129/75 - 135/61)  BP(mean): --  RR: 18 (18 Oct 2020 06:18) (18 - 18)  SpO2: 98% (18 Oct 2020 06:18) (96% - 98%)    PHYSICAL EXAM:  Gen: NAD, resting in bed  HEENT: Normocephalic, atraumatic  Neck: supple, no lymphadenopathy  CV: Regular rate & regular rhythm  Lungs: decreased BS at bases, no fremitus  Abdomen: Soft, BS present  Ext: Warm, well perfused  Neuro: non focal, awake  Skin: no rash, no erythema  Lines: no phlebitis    FH: Non-contributory  Social Hx: Non-contributory    TESTS & MEASUREMENTS:                        12.5   9.54  )-----------( 259      ( 18 Oct 2020 04:30 )             38.2     10-18    133<L>  |  99  |  16  ----------------------------<  140<H>  4.3   |  26  |  0.7    Ca    9.6      18 Oct 2020 04:30      eGFR if Non African American: 137 mL/min/1.73M2 (10-18-20 @ 04:30)  eGFR if African American: 159 mL/min/1.73M2 (10-18-20 @ 04:30)          Culture - Blood (collected 10-15-20 @ 10:53)  Source: .Blood Blood-Venous  Preliminary Report (10-16-20 @ 22:02):    No growth to date.    Culture - Urine (collected 10-14-20 @ 17:20)  Source: .Urine Clean Catch (Midstream)  Final Report (10-15-20 @ 21:45):    <10,000 CFU/mL Normal Urogenital Meggan    Culture - Blood (collected 10-14-20 @ 12:15)  Source: .Blood Blood  Preliminary Report (10-15-20 @ 23:01):    No growth to date.    Culture - Blood (collected 10-14-20 @ 12:15)  Source: .Blood Blood  Preliminary Report (10-15-20 @ 23:01):    No growth to date.            INFECTIOUS DISEASES TESTING  Rapid RVP Result: NotDetec (10-14-20 @ 12:39)  COVID-19 PCR: NotDetec (10-02-20 @ 07:46)      INFLAMMATORY MARKERS  Sedimentation Rate, Erythrocyte: 42 mm/Hr (10-18-20 @ 04:30)      RADIOLOGY & ADDITIONAL TESTS:  I have personally reviewed the last available Chest xray  CXR      CT      CARDIOLOGY TESTING  12 Lead ECG:   Ventricular Rate 106 BPM    Atrial Rate 106 BPM    P-R Interval 140 ms    QRS Duration 96 ms    Q-T Interval 340 ms    QTC Calculation(Bazett) 451 ms    P Axis 53 degrees    R Axis -4 degrees    T Axis 27 degrees    Diagnosis Line Sinus tachycardia  Otherwise normal ECG    Confirmed by FELICIA JACOME MD ([id number]) on 10/5/2020 10:35:32 PM (10-05-20 @ 21:16)      MEDICATIONS  cefepime   IVPB 2000 IV Intermittent every 8 hours  dextrose 5% + sodium chloride 0.45%. 1000 IV Continuous <Continuous>  heparin   Injectable 5000 SubCutaneous every 8 hours  lactobacillus acidophilus 1 Oral daily  melatonin 10 Oral at bedtime  multivitamin 1 Oral daily  pantoprazole    Tablet 40 Oral before breakfast  polyethylene glycol 3350 17 Oral daily  vancomycin  IVPB 1500 IV Intermittent every 8 hours      WEIGHT  Weight (kg): 145.5 (10-14-20 @ 10:50)  Creatinine, Serum: 0.7 mg/dL (10-18-20 @ 04:30)      ANTIBIOTICS:  cefepime   IVPB 2000 milliGRAM(s) IV Intermittent every 8 hours  vancomycin  IVPB 1500 milliGRAM(s) IV Intermittent every 8 hours      All available historical records have been reviewed

## 2020-10-18 NOTE — PROGRESS NOTE ADULT - SUBJECTIVE AND OBJECTIVE BOX
Subjective: 19yMale with a pmhx of FEVER POST-OP    ^POST OPERATION COMPLICATIONS    Family history of diabetes mellitus (DM)    Handoff    MEWS Score    Anxiety    Anxiety    Depression    No pertinent past medical history    Fever postop    Fever postop    H/O spinal fusion    S/P ACL repair    History of tonsillectomy    POST OPERATION COMPLICATIONS    SysAdmin_VisitLink      HD # 4    Fevers    Pt seen and examined at bedside. Pt sts he has mild back pain but improved from yesterday. Sts overall feeling much better than when he was admitted. He admits to mild headache but improved. Some Right sided neck soreness but no stiffness or rigidity.    Allergies    No Known Allergies    Intolerances        Vital Signs Last 24 Hrs  T(C): 35.9 (18 Oct 2020 08:26), Max: 38.4 (17 Oct 2020 20:45)  T(F): 96.7 (18 Oct 2020 08:26), Max: 101.2 (17 Oct 2020 20:45)  HR: 91 (18 Oct 2020 06:18) (91 - 100)  BP: 130/65 (18 Oct 2020 06:18) (127/65 - 135/61)  BP(mean): --  RR: 18 (18 Oct 2020 06:18) (18 - 18)  SpO2: 98% (18 Oct 2020 06:18) (96% - 98%)      acetaminophen   Tablet .. 650 milliGRAM(s) Oral every 6 hours PRN  bisacodyl 5 milliGRAM(s) Oral every 12 hours PRN  cefepime   IVPB 2000 milliGRAM(s) IV Intermittent every 8 hours  dextrose 5% + sodium chloride 0.45%. 1000 milliLiter(s) IV Continuous <Continuous>  heparin   Injectable 5000 Unit(s) SubCutaneous every 8 hours  lactobacillus acidophilus 1 Tablet(s) Oral daily  magnesium citrate Oral Solution 1 Bottle Oral once PRN  melatonin 10 milliGRAM(s) Oral at bedtime  methocarbamol 750 milliGRAM(s) Oral every 6 hours PRN  multivitamin 1 Tablet(s) Oral daily  ondansetron Injectable 4 milliGRAM(s) IV Push every 6 hours PRN  oxycodone    5 mG/acetaminophen 325 mG 1 Tablet(s) Oral every 6 hours PRN  oxycodone    5 mG/acetaminophen 325 mG 2 Tablet(s) Oral every 6 hours PRN  pantoprazole    Tablet 40 milliGRAM(s) Oral before breakfast  polyethylene glycol 3350 17 Gram(s) Oral daily  senna 2 Tablet(s) Oral at bedtime PRN  vancomycin  IVPB 1500 milliGRAM(s) IV Intermittent every 8 hours        10-17-20 @ 07:01  -  10-18-20 @ 07:00  --------------------------------------------------------  IN: 1200 mL / OUT: 502 mL / NET: 698 mL        REVIEW OF SYSTEMS    [ X ] A ten-point review of systems was otherwise negative except as noted.  [ ] Due to altered mental status/intubation, subjective information were not able to be obtained from the patient. History was obtained, to the extent possible, from review of the chart and collateral sources of information.      Exam:  AAOX3. Verbal function intact  tongue midline, facial motions symmetric  Dressing C/D/I - no drainage  MUÑOZ  + sensation    CBC Full  -  ( 18 Oct 2020 04:30 )  WBC Count : 9.54 K/uL  RBC Count : 4.34 M/uL  Hemoglobin : 12.5 g/dL  Hematocrit : 38.2 %  Platelet Count - Automated : 259 K/uL  Mean Cell Volume : 88.0 fL  Mean Cell Hemoglobin : 28.8 pg  Mean Cell Hemoglobin Concentration : 32.7 g/dL    10-18    133<L>  |  99  |  16  ----------------------------<  140<H>  4.3   |  26  |  0.7    Ca    9.6      18 Oct 2020 04:30      Imaging:    < from: [facility] Duplex Lower Ext Vein Scan, Bilat (10.14.20 @ 16:14) >  Impression:    No evidence of deep venous thrombosis or superficial thrombophlebitis in the bilateral lower extremities.    ICD-10:M79.89    < end of copied text >        < from: Xray Chest 1 View AP/PA (10.14.20 @ 13:14) >  Impression:    No focal consolidation.    < end of copied text >      Culture - Blood (10.15.20 @ 10:53)   Specimen Source: .Blood Blood-Venous   Culture Results:   No growth to date.   Culture - Urine (10.14.20 @ 17:20)   Specimen Source: .Urine Clean Catch (Midstream)   Culture Results:   <10,000 CFU/mL Normal Urogenital Meggan         Assessment/Plan:   HD # 4 Fevers  ID Follow up, vanco trough 4.7, spoke with Dr. Cisneros states to keep vanc [id number] then get a level 8pm 10/18  Continue IV antibiotics, fever curve and WBC downtrending  F/U Cultures - prelim neg  d/w attending

## 2020-10-19 DIAGNOSIS — G97.82 OTHER POSTPROCEDURAL COMPLICATIONS AND DISORDERS OF NERVOUS SYSTEM: ICD-10-CM

## 2020-10-19 DIAGNOSIS — N50.811 RIGHT TESTICULAR PAIN: ICD-10-CM

## 2020-10-19 DIAGNOSIS — K59.00 CONSTIPATION, UNSPECIFIED: ICD-10-CM

## 2020-10-19 DIAGNOSIS — G96.09 OTHER SPINAL CEREBROSPINAL FLUID LEAK: ICD-10-CM

## 2020-10-19 DIAGNOSIS — R51.9 HEADACHE, UNSPECIFIED: ICD-10-CM
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
## 2020-10-19 DIAGNOSIS — D72.829 ELEVATED WHITE BLOOD CELL COUNT, UNSPECIFIED: ICD-10-CM

## 2020-10-19 DIAGNOSIS — E86.0 DEHYDRATION: ICD-10-CM
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
## 2020-10-19 DIAGNOSIS — T50.2X5A ADVERSE EFFECT OF CARBONIC-ANHYDRASE INHIBITORS, BENZOTHIADIAZIDES AND OTHER DIURETICS, INITIAL ENCOUNTER: ICD-10-CM

## 2020-10-19 DIAGNOSIS — H53.149 VISUAL DISCOMFORT, UNSPECIFIED: ICD-10-CM

## 2020-10-19 DIAGNOSIS — F17.200 NICOTINE DEPENDENCE, UNSPECIFIED, UNCOMPLICATED: ICD-10-CM

## 2020-10-19 DIAGNOSIS — F41.9 ANXIETY DISORDER, UNSPECIFIED: ICD-10-CM

## 2020-10-19 DIAGNOSIS — T85.635A: ICD-10-CM

## 2020-10-19 DIAGNOSIS — M62.838 OTHER MUSCLE SPASM: ICD-10-CM

## 2020-10-19 DIAGNOSIS — N17.9 ACUTE KIDNEY FAILURE, UNSPECIFIED: ICD-10-CM

## 2020-10-19 LAB
ANION GAP SERPL CALC-SCNC: 10 MMOL/L — SIGNIFICANT CHANGE UP (ref 7–14)
APTT BLD: 26.7 SEC — LOW (ref 27–39.2)
BUN SERPL-MCNC: 18 MG/DL — SIGNIFICANT CHANGE UP (ref 10–20)
CALCIUM SERPL-MCNC: 9 MG/DL — SIGNIFICANT CHANGE UP (ref 8.5–10.1)
CHLORIDE SERPL-SCNC: 102 MMOL/L — SIGNIFICANT CHANGE UP (ref 98–110)
CO2 SERPL-SCNC: 24 MMOL/L — SIGNIFICANT CHANGE UP (ref 17–32)
CREAT SERPL-MCNC: 0.8 MG/DL — SIGNIFICANT CHANGE UP (ref 0.3–1)
CRP SERPL-MCNC: 7.65 MG/DL — HIGH (ref 0–0.4)
CULTURE RESULTS: SIGNIFICANT CHANGE UP
CULTURE RESULTS: SIGNIFICANT CHANGE UP
GLUCOSE SERPL-MCNC: 133 MG/DL — HIGH (ref 70–99)
HCT VFR BLD CALC: 39 % — LOW (ref 42–52)
HGB BLD-MCNC: 12.7 G/DL — LOW (ref 14–18)
INR BLD: 1.22 RATIO — SIGNIFICANT CHANGE UP (ref 0.65–1.3)
MAGNESIUM SERPL-MCNC: 2 MG/DL — SIGNIFICANT CHANGE UP (ref 1.8–2.4)
MCHC RBC-ENTMCNC: 29.2 PG — SIGNIFICANT CHANGE UP (ref 27–31)
MCHC RBC-ENTMCNC: 32.6 G/DL — SIGNIFICANT CHANGE UP (ref 32–37)
MCV RBC AUTO: 89.7 FL — SIGNIFICANT CHANGE UP (ref 80–94)
NRBC # BLD: 0 /100 WBCS — SIGNIFICANT CHANGE UP (ref 0–0)
PHOSPHATE SERPL-MCNC: 4.2 MG/DL — SIGNIFICANT CHANGE UP (ref 2.1–4.9)
PLATELET # BLD AUTO: 275 K/UL — SIGNIFICANT CHANGE UP (ref 130–400)
POTASSIUM SERPL-MCNC: 4.8 MMOL/L — SIGNIFICANT CHANGE UP (ref 3.5–5)
POTASSIUM SERPL-SCNC: 4.8 MMOL/L — SIGNIFICANT CHANGE UP (ref 3.5–5)
PROTHROM AB SERPL-ACNC: 14 SEC — HIGH (ref 9.95–12.87)
RBC # BLD: 4.35 M/UL — LOW (ref 4.7–6.1)
RBC # FLD: 12.9 % — SIGNIFICANT CHANGE UP (ref 11.5–14.5)
SODIUM SERPL-SCNC: 136 MMOL/L — SIGNIFICANT CHANGE UP (ref 135–146)
SPECIMEN SOURCE: SIGNIFICANT CHANGE UP
SPECIMEN SOURCE: SIGNIFICANT CHANGE UP
WBC # BLD: 10.78 K/UL — SIGNIFICANT CHANGE UP (ref 4.8–10.8)
WBC # FLD AUTO: 10.78 K/UL — SIGNIFICANT CHANGE UP (ref 4.8–10.8)

## 2020-10-19 RX ADMIN — POLYETHYLENE GLYCOL 3350 17 GRAM(S): 17 POWDER, FOR SOLUTION ORAL at 11:12

## 2020-10-19 RX ADMIN — HEPARIN SODIUM 5000 UNIT(S): 5000 INJECTION INTRAVENOUS; SUBCUTANEOUS at 06:05

## 2020-10-19 RX ADMIN — Medication 1 TABLET(S): at 11:11

## 2020-10-19 RX ADMIN — CEFEPIME 100 MILLIGRAM(S): 1 INJECTION, POWDER, FOR SOLUTION INTRAMUSCULAR; INTRAVENOUS at 06:06

## 2020-10-19 RX ADMIN — OXYCODONE AND ACETAMINOPHEN 2 TABLET(S): 5; 325 TABLET ORAL at 03:29

## 2020-10-19 RX ADMIN — Medication 300 MILLIGRAM(S): at 21:11

## 2020-10-19 RX ADMIN — PANTOPRAZOLE SODIUM 40 MILLIGRAM(S): 20 TABLET, DELAYED RELEASE ORAL at 06:06

## 2020-10-19 RX ADMIN — CEFEPIME 100 MILLIGRAM(S): 1 INJECTION, POWDER, FOR SOLUTION INTRAMUSCULAR; INTRAVENOUS at 14:41

## 2020-10-19 RX ADMIN — HEPARIN SODIUM 5000 UNIT(S): 5000 INJECTION INTRAVENOUS; SUBCUTANEOUS at 14:38

## 2020-10-19 RX ADMIN — OXYCODONE AND ACETAMINOPHEN 2 TABLET(S): 5; 325 TABLET ORAL at 16:50

## 2020-10-19 RX ADMIN — CEFEPIME 100 MILLIGRAM(S): 1 INJECTION, POWDER, FOR SOLUTION INTRAMUSCULAR; INTRAVENOUS at 21:11

## 2020-10-19 RX ADMIN — Medication 300 MILLIGRAM(S): at 15:40

## 2020-10-19 RX ADMIN — HEPARIN SODIUM 5000 UNIT(S): 5000 INJECTION INTRAVENOUS; SUBCUTANEOUS at 21:11

## 2020-10-19 RX ADMIN — Medication 10 MILLIGRAM(S): at 21:11

## 2020-10-19 RX ADMIN — Medication 300 MILLIGRAM(S): at 06:05

## 2020-10-19 NOTE — DIETITIAN INITIAL EVALUATION ADULT. - PERTINENT MEDS FT
heparin, abx, D5+1/2NS, dulcolax, lactobacillus acidophilus, mag citrate, miralax, MVI, zofran, protonix, senna

## 2020-10-19 NOTE — PROGRESS NOTE ADULT - SUBJECTIVE AND OBJECTIVE BOX
BINH BECKHAM  19y, Male  Allergy: No Known Allergies      LOS  5d    CHIEF COMPLAINT: POst op fever (18 Oct 2020 15:12)      INTERVAL EVENTS/HPI  - No acute events overnight  - T(F): , Max: 98.5 (10-18-20 @ 16:10)  - started to feel very hot this morning, but no fevers  - denies any headaches, shortness of breath, neck stiffness   - Denies any worsening symptoms  - Tolerating medication  - WBC Count: 10.78 (10-19-20 @ 06:36)  WBC Count: 9.54 (10-18-20 @ 04:30)     - Creatinine, Serum: 0.8 (10-19-20 @ 06:36)  Creatinine, Serum: 0.8 (10-18-20 @ 20:00)       ROS  General: Denies rigors, nightsweats  HEENT: Denies headache, rhinorrhea, sore throat, eye pain  CV: Denies CP, palpitations  PULM: Denies wheezing, hemoptysis  GI: Denies hematemesis, hematochezia, melena  : Denies discharge, hematuria  MSK: Denies arthralgias, myalgias  SKIN: Denies rash, lesions  NEURO: Denies paresthesias, weakness  PSYCH: Denies depression, anxiety    VITALS:  T(F): 97.3, Max: 98.5 (10-18-20 @ 16:10)  HR: 104  BP: 127/69  RR: 16Vital Signs Last 24 Hrs  T(C): 36.3 (19 Oct 2020 10:04), Max: 36.9 (18 Oct 2020 16:10)  T(F): 97.3 (19 Oct 2020 10:04), Max: 98.5 (18 Oct 2020 16:10)  HR: 104 (19 Oct 2020 05:22) (82 - 111)  BP: 127/69 (19 Oct 2020 05:22) (127/69 - 129/76)  BP(mean): --  RR: 16 (19 Oct 2020 05:22) (16 - 18)  SpO2: 98% (18 Oct 2020 21:28) (98% - 98%)    PHYSICAL EXAM:  Gen: NAD, resting in bed  HEENT: Normocephalic, atraumatic  Neck: supple, no lymphadenopathy  CV: Regular rate & regular rhythm  Lungs: decreased BS at bases, no fremitus  Abdomen: Soft, BS present  Ext: Warm, well perfused  Neuro: non focal, awake  Skin: no rash, no erythema  Lines: no phlebitis    FH: Non-contributory  Social Hx: Non-contributory    TESTS & MEASUREMENTS:                        12.7   10.78 )-----------( 275      ( 19 Oct 2020 06:36 )             39.0     10-19    136  |  102  |  18  ----------------------------<  133<H>  4.8   |  24  |  0.8    Ca    9.0      19 Oct 2020 06:36  Phos  4.2     10-19  Mg     2.0     10-19      eGFR if Non African American: 130 mL/min/1.73M2 (10-19-20 @ 06:36)  eGFR if African American: 150 mL/min/1.73M2 (10-19-20 @ 06:36)  eGFR if Non African American: 130 mL/min/1.73M2 (10-18-20 @ 20:00)  eGFR if African American: 150 mL/min/1.73M2 (10-18-20 @ 20:00)          Culture - Blood (collected 10-15-20 @ 10:53)  Source: .Blood Blood-Venous  Preliminary Report (10-16-20 @ 22:02):    No growth to date.    Culture - Urine (collected 10-14-20 @ 17:20)  Source: .Urine Clean Catch (Midstream)  Final Report (10-15-20 @ 21:45):    <10,000 CFU/mL Normal Urogenital Meggan    Culture - Blood (collected 10-14-20 @ 12:15)  Source: .Blood Blood  Preliminary Report (10-15-20 @ 23:01):    No growth to date.    Culture - Blood (collected 10-14-20 @ 12:15)  Source: .Blood Blood  Preliminary Report (10-15-20 @ 23:01):    No growth to date.            INFECTIOUS DISEASES TESTING  Rapid RVP Result: NotDetec (10-14-20 @ 12:39)  COVID-19 PCR: NotDetec (10-02-20 @ 07:46)      INFLAMMATORY MARKERS  Sedimentation Rate, Erythrocyte: 42 mm/Hr (10-18-20 @ 04:30)      RADIOLOGY & ADDITIONAL TESTS:  I have personally reviewed the last available Chest xray  CXR      CT      CARDIOLOGY TESTING  12 Lead ECG:   Ventricular Rate 106 BPM    Atrial Rate 106 BPM    P-R Interval 140 ms    QRS Duration 96 ms    Q-T Interval 340 ms    QTC Calculation(Bazett) 451 ms    P Axis 53 degrees    R Axis -4 degrees    T Axis 27 degrees    Diagnosis Line Sinus tachycardia  Otherwise normal ECG    Confirmed by FELICIA JACOME MD ([id number]) on 10/5/2020 10:35:32 PM (10-05-20 @ 21:16)      MEDICATIONS  cefepime   IVPB 2000 IV Intermittent every 8 hours  dextrose 5% + sodium chloride 0.45%. 1000 IV Continuous <Continuous>  heparin   Injectable 5000 SubCutaneous every 8 hours  lactobacillus acidophilus 1 Oral daily  melatonin 10 Oral at bedtime  multivitamin 1 Oral daily  pantoprazole    Tablet 40 Oral before breakfast  polyethylene glycol 3350 17 Oral daily  vancomycin  IVPB 1500 IV Intermittent every 8 hours      WEIGHT  Weight (kg): 145.5 (10-14-20 @ 10:50)  Creatinine, Serum: 0.8 mg/dL (10-19-20 @ 06:36)  Creatinine, Serum: 0.8 mg/dL (10-18-20 @ 20:00)      ANTIBIOTICS:  cefepime   IVPB 2000 milliGRAM(s) IV Intermittent every 8 hours  vancomycin  IVPB 1500 milliGRAM(s) IV Intermittent every 8 hours      All available historical records have been reviewed    MEDICATIONS  (PRN):  cefepime   IVPB   100 mL/Hr IV Intermittent (10-19-20 @ 06:06)   100 mL/Hr IV Intermittent (10-18-20 @ 21:36)   100 mL/Hr IV Intermittent (10-18-20 @ 13:43)   100 mL/Hr IV Intermittent (10-18-20 @ 05:40)   100 mL/Hr IV Intermittent (10-17-20 @ 21:21)   100 mL/Hr IV Intermittent (10-17-20 @ 15:17)   100 mL/Hr IV Intermittent (10-17-20 @ 05:47)   100 mL/Hr IV Intermittent (10-16-20 @ 21:57)   100 mL/Hr IV Intermittent (10-16-20 @ 15:59)    cefepime   IVPB   100 mL/Hr IV Intermittent (10-15-20 @ 12:11)    cefepime   IVPB   100 mL/Hr IV Intermittent (10-16-20 @ 06:16)   100 mL/Hr IV Intermittent (10-15-20 @ 18:14)    vancomycin  IVPB   250 mL/Hr IV Intermittent (10-15-20 @ 12:11)    vancomycin  IVPB   250 mL/Hr IV Intermittent (10-16-20 @ 06:16)   250 mL/Hr IV Intermittent (10-15-20 @ 18:14)    vancomycin  IVPB   250 mL/Hr IV Intermittent (10-17-20 @ 15:17)   250 mL/Hr IV Intermittent (10-17-20 @ 05:47)   250 mL/Hr IV Intermittent (10-16-20 @ 22:23)   250 mL/Hr IV Intermittent (10-16-20 @ 15:59)    vancomycin  IVPB   300 mL/Hr IV Intermittent (10-19-20 @ 06:05)   300 mL/Hr IV Intermittent (10-18-20 @ 22:16)   300 mL/Hr IV Intermittent (10-18-20 @ 13:43)   300 mL/Hr IV Intermittent (10-18-20 @ 05:40)   300 mL/Hr IV Intermittent (10-17-20 @ 21:21)        cefepime   IVPB 2000 milliGRAM(s) IV Intermittent every 8 hours  vancomycin  IVPB 1500 milliGRAM(s) IV Intermittent every 8 hours

## 2020-10-19 NOTE — DIETITIAN INITIAL EVALUATION ADULT. - PHYSCIAL ASSESSMENT
alert and oriented. BMI: 36.9 using wt of 320# [pt 145.5kg, not 145.5#] and stated ht of 78", IBW: 214#, stable UBW, denies any recent wt changes. no edema noted, surgical incision/obese

## 2020-10-19 NOTE — DIETITIAN INITIAL EVALUATION ADULT. - FEEDING SKILL
Pt with good appetite, but only consuming ~50% of his meals 2/2 dislike of hospital food, however pt's mom at b/s states that she brings meals from home and pt is also eating all of them. Declined need for oral supplementation./independent

## 2020-10-19 NOTE — DIETITIAN INITIAL EVALUATION ADULT. - NAME AND PHONE
Pt to maintain % po intake of meals and snacks + food brought from home over the next 7 days. RD to monitor energy intake, body composition, NFPF

## 2020-10-19 NOTE — PROGRESS NOTE ADULT - SUBJECTIVE AND OBJECTIVE BOX
HD #6     S/P Fevers    Pt seen and examined at bedside. Pt without complaints at this time. Sts neck pain resolved. Pt afebrile.    Vital Signs Last 24 Hrs  T(C): 36.3 (19 Oct 2020 10:04), Max: 36.9 (18 Oct 2020 16:10)  T(F): 97.3 (19 Oct 2020 10:04), Max: 98.5 (18 Oct 2020 16:10)  HR: 104 (19 Oct 2020 05:22) (82 - 111)  BP: 127/69 (19 Oct 2020 05:22) (127/69 - 129/76)  BP(mean): --  RR: 16 (19 Oct 2020 05:22) (16 - 18)  SpO2: 98% (18 Oct 2020 21:28) (98% - 98%)    PHYSICAL EXAM:  Strength 5/5  + Sensation to light touch  Incisions C/D/I      MEDICATIONS:  Antibiotics:  cefepime   IVPB 2000 milliGRAM(s) IV Intermittent every 8 hours  vancomycin  IVPB 1500 milliGRAM(s) IV Intermittent every 8 hours    Neuro:  acetaminophen   Tablet .. 650 milliGRAM(s) Oral every 6 hours PRN  melatonin 10 milliGRAM(s) Oral at bedtime  methocarbamol 750 milliGRAM(s) Oral every 6 hours PRN  ondansetron Injectable 4 milliGRAM(s) IV Push every 6 hours PRN  oxycodone    5 mG/acetaminophen 325 mG 1 Tablet(s) Oral every 6 hours PRN  oxycodone    5 mG/acetaminophen 325 mG 2 Tablet(s) Oral every 6 hours PRN    Anticoagulation:  heparin   Injectable 5000 Unit(s) SubCutaneous every 8 hours    OTHER:  bisacodyl 5 milliGRAM(s) Oral every 12 hours PRN  lactobacillus acidophilus 1 Tablet(s) Oral daily  magnesium citrate Oral Solution 1 Bottle Oral once PRN  pantoprazole    Tablet 40 milliGRAM(s) Oral before breakfast  polyethylene glycol 3350 17 Gram(s) Oral daily  senna 2 Tablet(s) Oral at bedtime PRN    IVF:  dextrose 5% + sodium chloride 0.45%. 1000 milliLiter(s) IV Continuous <Continuous>  multivitamin 1 Tablet(s) Oral daily    LABS:                        12.7   10.78 )-----------( 275      ( 19 Oct 2020 06:36 )             39.0     10-19    136  |  102  |  18  ----------------------------<  133<H>  4.8   |  24  |  0.8    Ca    9.0      19 Oct 2020 06:36  Phos  4.2     10-19  Mg     2.0     10-19      PT/INR - ( 19 Oct 2020 06:36 )   PT: 14.00 sec;   INR: 1.22 ratio    PTT - ( 19 Oct 2020 06:36 )  PTT:26.7 sec    Assessment:  As above    Plan:  Pulmonary F/U  PT HD #6     S/P Fevers    Pt seen and examined at bedside. Pt without complaints at this time. Sts neck pain resolved. Pt afebrile.    Vital Signs Last 24 Hrs  T(C): 36.3 (19 Oct 2020 10:04), Max: 36.9 (18 Oct 2020 16:10)  T(F): 97.3 (19 Oct 2020 10:04), Max: 98.5 (18 Oct 2020 16:10)  HR: 104 (19 Oct 2020 05:22) (82 - 111)  BP: 127/69 (19 Oct 2020 05:22) (127/69 - 129/76)  BP(mean): --  RR: 16 (19 Oct 2020 05:22) (16 - 18)  SpO2: 98% (18 Oct 2020 21:28) (98% - 98%)    PHYSICAL EXAM:  Strength 5/5  + Sensation to light touch  Incisions C/D/I      MEDICATIONS:  Antibiotics:  cefepime   IVPB 2000 milliGRAM(s) IV Intermittent every 8 hours  vancomycin  IVPB 1500 milliGRAM(s) IV Intermittent every 8 hours    Neuro:  acetaminophen   Tablet .. 650 milliGRAM(s) Oral every 6 hours PRN  melatonin 10 milliGRAM(s) Oral at bedtime  methocarbamol 750 milliGRAM(s) Oral every 6 hours PRN  ondansetron Injectable 4 milliGRAM(s) IV Push every 6 hours PRN  oxycodone    5 mG/acetaminophen 325 mG 1 Tablet(s) Oral every 6 hours PRN  oxycodone    5 mG/acetaminophen 325 mG 2 Tablet(s) Oral every 6 hours PRN    Anticoagulation:  heparin   Injectable 5000 Unit(s) SubCutaneous every 8 hours    OTHER:  bisacodyl 5 milliGRAM(s) Oral every 12 hours PRN  lactobacillus acidophilus 1 Tablet(s) Oral daily  magnesium citrate Oral Solution 1 Bottle Oral once PRN  pantoprazole    Tablet 40 milliGRAM(s) Oral before breakfast  polyethylene glycol 3350 17 Gram(s) Oral daily  senna 2 Tablet(s) Oral at bedtime PRN    IVF:  dextrose 5% + sodium chloride 0.45%. 1000 milliLiter(s) IV Continuous <Continuous>  multivitamin 1 Tablet(s) Oral daily    LABS:                        12.7   10.78 )-----------( 275      ( 19 Oct 2020 06:36 )             39.0     10-19    136  |  102  |  18  ----------------------------<  133<H>  4.8   |  24  |  0.8    Ca    9.0      19 Oct 2020 06:36  Phos  4.2     10-19  Mg     2.0     10-19      PT/INR - ( 19 Oct 2020 06:36 )   PT: 14.00 sec;   INR: 1.22 ratio    PTT - ( 19 Oct 2020 06:36 )  PTT:26.7 sec    Assessment:  As above  Appreciate ID Follow up    Plan:  [facility] for [facility] [employer]  D/C Home when VN set up

## 2020-10-19 NOTE — DIETITIAN INITIAL EVALUATION ADULT. - OTHER CALCULATIONS
Calories: 7645-4761 kcal/day (MSJ x 1-1.1 AF)--obese BMI considered; Protein:  g/day (1-1.2 gm/kg IBW)--same as above; Fluid: 1 ml/kcal

## 2020-10-19 NOTE — PROGRESS NOTE ADULT - ASSESSMENT
ASSESSMENT  19 year old male with hx of L5-S1 Transforaminal lumbar interbody fusion on 9/25 pt went back to the OR on 10/3 for repair of CSF leak and placement of lumbar drain.  Pt presented to the ER for history of fever that went up to 102.     IMPRESSION  #Suspect post-op meningitis     Exam with meningeal signs    Exploration, wound, lumbar region, with repair of dural defect if indicated, for CSF leak 03-Oct-2020 12:36:13 left L5-S1    Fusion, spine, lumbar, TLIF 25-Sep-2020 16:37:26 Left L5-S1, with bilateral L4-S1 pedicle fusion "Grade II spondylolisthesis at L5-S1, with severe degenerative disc disease, facet arthropathy and neural compression.  CSF durotomy incurred during decompression, which was controlled and repaired by Duragen on-lay matrix."    < from: CT Lumbar Spine No Cont (10.02.20 @ 17:15) >Bilateral dorsal subcutaneous fluid collection centered at about L3/L4, measuring up to 5.5 cm on the right side and 8.5 cm on the left side. Additional edema/ill-defined fluid collection within the left dorsal paraspinal musculature measuring up to 7 cm. Since the clinical concern is for CSF leak, a CSF leak is not excluded.    Patient at high risk and difficult access for LP    Blood Cx 10/15 NG    Sedimentation Rate, Erythrocyte: 42 mm/Hr (10.18.20 @ 04:30)      #Sepsis on admission T>101F, Pulse>90,  WBC 18  #Morbid Obesity BMI (kg/m2): 37.1      RECOMMENDATIONS  - Cefepime 2g q8h IV  - vancomycin trough 10.1; ok to continue vancomycin 1500 mg q 8 hours as fevers have improved  - follow-up CRP  - plan for 3 weeks of IV ABX (end date 11/4)  - Per NSGY, no role for repeat imaging    - Weekly CBC, CMP, ESR/CRP, Vanc trough  - will arrange ID follow-up with Dr. Huseyin Rojo for Telehealth. We will call the patient between 10:30-1:30      [id number]        [phone]       Fax [phone]    Please call or message on [employer] Teams if with any questions.  Spectra [id number]

## 2020-10-19 NOTE — DIETITIAN INITIAL EVALUATION ADULT. - ORAL INTAKE PTA/DIET HISTORY
Pt well known to RD from previous adm ~2 weeks ago, he remains with excellent appetite/po intake, typically consume 3 meals w/ snacks daily and denies use of oral nutrition supplements. Pt with no cultural/Mandaeism restrictions and has NKFA. Pt follows regular diet at home.

## 2020-10-19 NOTE — DIETITIAN INITIAL EVALUATION ADULT. - CHIEF COMPLAINT
The patient is a 19y Male complaining of fever. Continue IV antibiotics, fever curve and WBC downtrending  F/u cultures, prelim neg

## 2020-10-20 ENCOUNTER — TRANSCRIPTION ENCOUNTER (OUTPATIENT)
Age: 20
End: 2020-10-20

## 2020-10-20 VITALS
TEMPERATURE: 97 F | DIASTOLIC BLOOD PRESSURE: 55 MMHG | SYSTOLIC BLOOD PRESSURE: 112 MMHG | HEART RATE: 80 BPM | RESPIRATION RATE: 18 BRPM

## 2020-10-20 LAB
CULTURE RESULTS: SIGNIFICANT CHANGE UP
SPECIMEN SOURCE: SIGNIFICANT CHANGE UP

## 2020-10-20 RX ORDER — VANCOMYCIN HCL 1 G
1.5 VIAL (EA) INTRAVENOUS
Qty: 0 | Refills: 0 | DISCHARGE
Start: 2020-10-20

## 2020-10-20 RX ORDER — CEFEPIME 1 G/1
2 INJECTION, POWDER, FOR SOLUTION INTRAMUSCULAR; INTRAVENOUS
Qty: 0 | Refills: 0 | DISCHARGE
Start: 2020-10-20

## 2020-10-20 RX ADMIN — CEFEPIME 100 MILLIGRAM(S): 1 INJECTION, POWDER, FOR SOLUTION INTRAMUSCULAR; INTRAVENOUS at 05:25

## 2020-10-20 RX ADMIN — OXYCODONE AND ACETAMINOPHEN 2 TABLET(S): 5; 325 TABLET ORAL at 01:31

## 2020-10-20 RX ADMIN — HEPARIN SODIUM 5000 UNIT(S): 5000 INJECTION INTRAVENOUS; SUBCUTANEOUS at 05:25

## 2020-10-20 RX ADMIN — Medication 300 MILLIGRAM(S): at 05:25

## 2020-10-20 RX ADMIN — PANTOPRAZOLE SODIUM 40 MILLIGRAM(S): 20 TABLET, DELAYED RELEASE ORAL at 05:25

## 2020-10-20 RX ADMIN — OXYCODONE AND ACETAMINOPHEN 2 TABLET(S): 5; 325 TABLET ORAL at 03:33

## 2020-10-20 NOTE — DISCHARGE NOTE PROVIDER - NSDCACTIVITY_GEN_ALL_CORE
Stairs allowed/Walking - Outdoors allowed/Walking - Indoors allowed/Do not drive or operate machinery/Showering allowed/Do not make important decisions/No heavy lifting/straining/No restrictions

## 2020-10-20 NOTE — DISCHARGE NOTE PROVIDER - NSDCFUADDINST_GEN_ALL_CORE_FT
- Upon discharge,  please call to schedule a follow up with Dr. Reyes in 1-2 weeks.  - Upon discharge, please call to make a follow up appointment with your primary care provider to discuss your recent hospitalization/operation.  - Keep dressings dry for 48 hours after which you may remove dressing and cleanse with soap and water in the shower (no scrubbing). Leave the steri strips (white tape) on your incisions, they will fall off on their own. Run water and soap over incision sites and pat dry (no scrubbing). No submerging your incision sites in water (i.e. no swimming or baths) for 2-3 weeks and avoid exposing the area to jets/streams of water.   - You can resume your normal activities as tolerated, but avoid heavy (>15lb.) lifting and strenuous exercise for 4-6 weeks.    - You were prescribed percocet (oxycodone-acetaminophen) for pain, take these only as needed.  Your pain should subside over the next few days.  While taking narcotic pain medications, you should not drive or operate heavy machinery. If taking with other medications containing acetaminophen (Tylenol), reduce your dose of percocet so that you  do not exceed 3000mg of acetaminophen per day.  ***You were prescribed narcotic pain medications, take these only as needed.  Your pain should subside over the next few days.  While taking narcotic pain medications, you should not drive or operate heavy machinery.   - Narcotic pain medicine tends to cause constipation, you have been prescribed colace 3 times a day to help prevent that. Hold the colace if you start to have loose stools.  - If you experience fevers, chills, increasing abdominal pain, nausea, vomiting, inability to pass stool or gas, bleeding, or any other acute symptoms, please call your doctor and report to the emergency room immediately for further management.  - Please make a follow up appt with Infectious Disease Dr. Huseyin Lomas as an outpatient.

## 2020-10-20 NOTE — DISCHARGE NOTE NURSING/CASE MANAGEMENT/SOCIAL WORK - PATIENT PORTAL LINK FT
You can access the FollowMyHealth Patient Portal offered by [facility] by registering at the following website: [email or web address]. By joining [facility]’s FollowMyHealth portal, you will also be able to view your health information using other applications (apps) compatible with our system.

## 2020-10-20 NOTE — PROGRESS NOTE ADULT - ATTENDING COMMENTS
Agree with above.     Fever curve and WBC trending down.   Pt symptomatically better.     Continue with IV antibiotics.  Continue to mobilize patient.     Follow up Janes Del Castillo.
Patient seen and examined.  Pt presented again with neck pain, headaches (unlike CSF leak/positional), and fevers.    All systemic workup aside from LP has been negative.  LP deferred given high suspicion for meningitis, given his operative history, and pt to be treated empirically for presumed meningitis.     ID input appreciated.     Will continue to monitor closely.
Plan discussed with neurosurgery PA.  Plan was follow Vanco trough levels.  Further abx management per ID.
Agree with above.  Patient is to continue IV antibiotics per her PICC line.  Dosing of antibiotics per Infectious Disease.      Patient is instructed to follow up with me in the office next week.
The patient was seen and examined.  Patient reports that his neck pain has resolved.  He reports mild headache, which is very manageable.      Bilateral paraspinal incisions are healing well, without any evidence of erythema, edema, fluctuance, or leaking.    Motor strength in lower extremities is full.    Discussed with Infectious Disease regarding dosing of antibiotics, and discharge planning.     The patient is cleared for discharge once the visiting nurse and infusion services have been set up.

## 2020-10-20 NOTE — DISCHARGE NOTE PROVIDER - CARE PROVIDER_API CALL
Candice Reyes  [facility]  [address] [place] [address]  Phone: [phone]  Fax: [phone]  Follow Up Time:     Huseyin Lomas  Infectious Disease  [address] [place] [address]  Phone: [phone]  Fax: [phone]  Follow Up Time:

## 2020-10-20 NOTE — DISCHARGE NOTE PROVIDER - NSDCCPCAREPLAN_GEN_ALL_CORE_FT
PRINCIPAL DISCHARGE DIAGNOSIS  Diagnosis: Fever postop  Assessment and Plan of Treatment:       SECONDARY DISCHARGE DIAGNOSES  Diagnosis: Fever postop  Assessment and Plan of Treatment: Fever postop

## 2020-10-20 NOTE — PROGRESS NOTE ADULT - REASON FOR ADMISSION
POst op fever

## 2020-10-20 NOTE — PROGRESS NOTE ADULT - SUBJECTIVE AND OBJECTIVE BOX
Subjective: 19yMale with a pmhx of FEVER POST-OP    ^POST OPERATION COMPLICATIONS    Family history of diabetes mellitus (DM)    Handoff    MEWS Score    Anxiety    Anxiety    Depression    No pertinent past medical history    Fever postop    Fever postop    H/O spinal fusion    S/P ACL repair    History of tonsillectomy    POST OPERATION COMPLICATIONS    SysAdmin_VisitLink    S/P Fevers    Pt seen and examined at bedside. Pt without complaints at this time. States neck pain resolved. Pt afebrile. Denies headache or visual changes.     Allergies    No Known Allergies    Intolerances        Vital Signs Last 24 Hrs  T(C): 36.2 (20 Oct 2020 05:05), Max: 36.7 (19 Oct 2020 20:57)  T(F): 97.2 (20 Oct 2020 05:05), Max: 98 (19 Oct 2020 20:57)  HR: 80 (20 Oct 2020 05:05) (80 - 89)  BP: 112/55 (20 Oct 2020 05:05) (112/55 - 146/63)  BP(mean): --  RR: 18 (20 Oct 2020 05:05) (16 - 18)  SpO2: --      acetaminophen   Tablet .. 650 milliGRAM(s) Oral every 6 hours PRN  bisacodyl 5 milliGRAM(s) Oral every 12 hours PRN  cefepime   IVPB 2000 milliGRAM(s) IV Intermittent every 8 hours  dextrose 5% + sodium chloride 0.45%. 1000 milliLiter(s) IV Continuous <Continuous>  heparin   Injectable 5000 Unit(s) SubCutaneous every 8 hours  lactobacillus acidophilus 1 Tablet(s) Oral daily  magnesium citrate Oral Solution 1 Bottle Oral once PRN  melatonin 10 milliGRAM(s) Oral at bedtime  methocarbamol 750 milliGRAM(s) Oral every 6 hours PRN  multivitamin 1 Tablet(s) Oral daily  ondansetron Injectable 4 milliGRAM(s) IV Push every 6 hours PRN  oxycodone    5 mG/acetaminophen 325 mG 1 Tablet(s) Oral every 6 hours PRN  oxycodone    5 mG/acetaminophen 325 mG 2 Tablet(s) Oral every 6 hours PRN  pantoprazole    Tablet 40 milliGRAM(s) Oral before breakfast  polyethylene glycol 3350 17 Gram(s) Oral daily  senna 2 Tablet(s) Oral at bedtime PRN  vancomycin  IVPB 1500 milliGRAM(s) IV Intermittent every 8 hours        10-19-20 @ 07:01  -  10-20-20 @ 07:00  --------------------------------------------------------  IN: 550 mL / OUT: 2100 mL / NET: -1550 mL    10-20-20 @ 07:01  -  10-20-20 @ 09:39  --------------------------------------------------------  IN: 0 mL / OUT: 500 mL / NET: -500 mL        REVIEW OF SYSTEMS    [X] A ten-point review of systems was otherwise negative except as noted.  [ ] Due to altered mental status/intubation, subjective information were not able to be obtained from the patient. History was obtained, to the extent possible, from review of the chart and collateral sources of information.      Exam:  AAOX3. Verbal function intact  facial motions symmetric  Motor: MAEx4, 5/5 power in b/l UE and LE  Sensation: intact to touch in all extremities  Negative Brudzinski and negative Kernig signs  Wound: Dressing C/D/I        CBC Full  -  ( 19 Oct 2020 06:36 )  WBC Count : 10.78 K/uL  RBC Count : 4.35 M/uL  Hemoglobin : 12.7 g/dL  Hematocrit : 39.0 %  Platelet Count - Automated : 275 K/uL  Mean Cell Volume : 89.7 fL  Mean Cell Hemoglobin : 29.2 pg  Mean Cell Hemoglobin Concentration : 32.6 g/dL  Auto Neutrophil # : x  Auto Lymphocyte # : x  Auto Monocyte # : x  Auto Eosinophil # : x  Auto Basophil # : x  Auto Neutrophil % : x  Auto Lymphocyte % : x  Auto Monocyte % : x  Auto Eosinophil % : x  Auto Basophil % : x    10-19    136  |  102  |  18  ----------------------------<  133<H>  4.8   |  24  |  0.8    Ca    9.0      19 Oct 2020 06:36  Phos  4.2     10-19  Mg     2.0     10-19      PT/INR - ( 19 Oct 2020 06:36 )   PT: 14.00 sec;   INR: 1.22 ratio         PTT - ( 19 Oct 2020 06:36 )  PTT:26.7 sec      Assessment/Plan:   19 year-old Male with a s/p FEVER POST-OP  -Awaiting update from   -D/C home today  -Per ID, plan for 3 weeks of IV ABX (end date 11/4) & Weekly CBC, CMP, ESR/CRP, Vanc trough  -Follow-up with Dr. Huseyin Rojo for Telehealth (ID)

## 2020-10-20 NOTE — PROGRESS NOTE ADULT - NSHPATTENDINGPLANDISCUSS_GEN_ALL_CORE
NSGY
neurosurgery KRISHNA Boyd.
neurosurgery KRISHNA Ramirez
neurosurgery PA

## 2020-10-20 NOTE — DISCHARGE NOTE PROVIDER - HOSPITAL COURSE
On 9/25/20 the patient underwent a TLIF complicated by dural leakage, followed by a dural repair with placement of a lumbar drain for which he was discharged on 10.12.20. Patient returned to the hospital on 10/14/20 experiencing at home fevers with Tmax of 103 and nuchal rigidity. On 9/25/20 the patient underwent a TLIF complicated by dural leakage, followed by a dural repair with placement of a lumbar drain for which he was discharged on 10.12.20. Patient returned to the hospital on 10/14/20 experiencing at home fevers with Tmax of 103 and nuchal rigidity. Infectious disease and neurosurgery were consulted and the patient was  treated empirically for a likely post operative meningitis with Cefepime and Vancomycin. On 10.16.20 the patient had a PICC line placed in his left arm for continued home administration of antibiotics. With antibiotic treatment the patients symptoms resolved and has been afebrile x 48hrs. On 10.17.20 the patient was evaluated by PT who cleared him for discharge home with home PT and continued antibiotic administration x 3 weeks (end date 11.14.20) and follow up with Dr. Huseyin Lomas and Dr. Candice Reyes.

## 2020-10-23 NOTE — CDI QUERY NOTE - NSCDIOTHERTXTBX_GEN_ALL_CORE_HH
10/14 > Clinical indicators: 19M w/ fever, stiff neck Hx Anxiety, L5-S1 fusion, csf leak repair   Admit V/S 117/65    RR 18  36.9  10/15>Max T: 101.5 wbc 18.4    Admitted for Fever postop, Suspect post-op meningitis    10/19 ID #Sepsis on admission T>101F, Pulse>90, WBC 18, Suspect post-op meningitis    10/20 DC Summary> Infectious disease and neurosurgery were consulted and the patient was  treated empirically for a likely post operative meningitis with Cefepime and Vancomycin.    Based on above clinical findings and your professional judgment the Dx of sepsis need further clarification    ?	Post op Sepsis present on admit  ?	Sepsis present not post op   ?	 Sepsis ruled out  ?	Other please specify  ?	Clinically unable to determine

## 2020-10-27 NOTE — CHART NOTE - NSCHARTNOTEFT_GEN_A_CORE
Attending addendum:    Sepsis was present on admission, as demonstrated by fever, elevated white blood cell count, and tachycardia.      Sepsis was not present immediately postoperatively, after the patient's 2nd surgery.

## 2021-12-14 NOTE — H&P PST ADULT - NS MD HP PULSE CAROTID
Detail Level: Zone Quality 111:Pneumonia Vaccination Status For Older Adults: Pneumococcal Vaccination Previously Received Quality 110: Preventive Care And Screening: Influenza Immunization: Influenza Immunization previously received during influenza season right normal/left normal

## 2022-08-11 ENCOUNTER — EMERGENCY (EMERGENCY)
Facility: HOSPITAL | Age: 22
LOS: 0 days | Discharge: HOME | End: 2022-08-11
Attending: EMERGENCY MEDICINE | Admitting: EMERGENCY MEDICINE

## 2022-08-11 VITALS
DIASTOLIC BLOOD PRESSURE: 80 MMHG | SYSTOLIC BLOOD PRESSURE: 145 MMHG | OXYGEN SATURATION: 96 % | HEIGHT: 78 IN | HEART RATE: 76 BPM | WEIGHT: 315 LBS | RESPIRATION RATE: 18 BRPM | TEMPERATURE: 98 F
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
## 2022-08-11 DIAGNOSIS — R10.12 LEFT UPPER QUADRANT PAIN: ICD-10-CM
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
## 2022-08-11 DIAGNOSIS — Z90.89 ACQUIRED ABSENCE OF OTHER ORGANS: Chronic | ICD-10-CM
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
## 2022-08-11 DIAGNOSIS — Z98.1 ARTHRODESIS STATUS: Chronic | ICD-10-CM

## 2022-08-11 DIAGNOSIS — R07.81 PLEURODYNIA: ICD-10-CM

## 2022-08-11 DIAGNOSIS — R10.32 LEFT LOWER QUADRANT PAIN: ICD-10-CM

## 2022-08-11 DIAGNOSIS — Z98.890 OTHER SPECIFIED POSTPROCEDURAL STATES: Chronic | ICD-10-CM
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
## 2022-08-11 DIAGNOSIS — F41.9 ANXIETY DISORDER, UNSPECIFIED: ICD-10-CM

## 2022-08-11 DIAGNOSIS — Z90.09 ACQUIRED ABSENCE OF OTHER PART OF HEAD AND NECK: ICD-10-CM

## 2022-08-11 LAB
APPEARANCE UR: CLEAR — SIGNIFICANT CHANGE UP
BACTERIA # UR AUTO: ABNORMAL
BILIRUB UR-MCNC: NEGATIVE — SIGNIFICANT CHANGE UP
COLOR SPEC: YELLOW — SIGNIFICANT CHANGE UP
DIFF PNL FLD: NEGATIVE — SIGNIFICANT CHANGE UP
EPI CELLS # UR: ABNORMAL /HPF
GLUCOSE UR QL: NEGATIVE MG/DL — SIGNIFICANT CHANGE UP
KETONES UR-MCNC: NEGATIVE — SIGNIFICANT CHANGE UP
LEUKOCYTE ESTERASE UR-ACNC: ABNORMAL
NITRITE UR-MCNC: NEGATIVE — SIGNIFICANT CHANGE UP
PH UR: 6.5 — SIGNIFICANT CHANGE UP (ref 5–8)
PROT UR-MCNC: NEGATIVE MG/DL — SIGNIFICANT CHANGE UP
RBC CASTS # UR COMP ASSIST: SIGNIFICANT CHANGE UP /HPF
SP GR SPEC: >=1.03 (ref 1.01–1.03)
UROBILINOGEN FLD QL: 0.2 MG/DL — SIGNIFICANT CHANGE UP
WBC UR QL: ABNORMAL /HPF

## 2022-08-11 PROCEDURE — 99284 EMERGENCY DEPT VISIT MOD MDM: CPT

## 2022-08-11 RX ORDER — METHOCARBAMOL 500 MG/1
1 TABLET, FILM COATED ORAL
Qty: 30 | Refills: 0
Start: 2022-08-11

## 2022-08-11 RX ORDER — METHOCARBAMOL 500 MG/1
1500 TABLET, FILM COATED ORAL ONCE
Refills: 0 | Status: COMPLETED | OUTPATIENT
Start: 2022-08-11 | End: 2022-08-11

## 2022-08-11 RX ORDER — IBUPROFEN 200 MG
600 TABLET ORAL ONCE
Refills: 0 | Status: COMPLETED | OUTPATIENT
Start: 2022-08-11 | End: 2022-08-11

## 2022-08-11 RX ADMIN — Medication 600 MILLIGRAM(S): at 02:19

## 2022-08-11 RX ADMIN — METHOCARBAMOL 1500 MILLIGRAM(S): 500 TABLET, FILM COATED ORAL at 02:18

## 2022-08-11 NOTE — ED PROVIDER NOTE - PATIENT PORTAL LINK FT
You can access the FollowMyHealth Patient Portal offered by [facility] by registering at the following website: [email or web address]. By joining [facility]’s FollowMyHealth portal, you will also be able to view your health information using other applications (apps) compatible with our system.

## 2022-08-11 NOTE — ED PROVIDER NOTE - CLINICAL SUMMARY MEDICAL DECISION MAKING FREE TEXT BOX
22 yo M, hx of anxiety, here for assessment of L sided lower rib/upper flank pain after heavy lifting at work. Sx began 2 days ago, no nausea, vomiting, dysuria, hematuria, fever, chills.    Vs normal, on exam has ttp over lower rib but no rash, deformity, no CVA ttp, soft, NT, ND abdomen.    UA negative for blood.     Likely MSK pain, improved with nsaids and robaxin.    Will dc home with close monitoring of sx, return precautions, follow up.

## 2022-08-11 NOTE — ED PROVIDER NOTE - NS ED ROS FT
Constitutional: no fever, chills, no recent weight loss, change in appetite or malaise  Cardiac: No chest pain, SOB or edema.  Respiratory: No cough or respiratory distress  GI: No nausea, vomiting, diarrhea or abdominal pain.  : No dysuria, frequency, urgency or hematuria  MS: See HPI  Neuro: No headache or weakness. No LOC.  Skin: No skin rash.  Endocrine: No history of thyroid disease or diabetes.

## 2022-08-11 NOTE — ED PROVIDER NOTE - PHYSICAL EXAMINATION
CONSTITUTIONAL: Well-appearing; well-nourished; in no apparent distress.   EYES: PERRL; EOM intact.   CARDIOVASCULAR: Normal S1, S2; no murmurs, rubs, or gallops.   RESPIRATORY: Normal chest excursion with respiration; breath sounds clear and equal bilaterally; no wheezes, rhonchi, or rales.  GI/: Normal bowel sounds; non-distended; non-tender; no palpable organomegaly.   MS: No midline tenderness to neck and back. + ttp to left lower rib cage. no crepitus  SKIN: No rash to left flank  NEURO/PSYCH: A & O x 4; grossly unremarkable.

## 2022-08-11 NOTE — ED PROVIDER NOTE - NS ED ATTENDING STATEMENT MOD
This was a shared visit with the [facility]. I reviewed and verified the documentation and independently performed the documented:

## 2022-08-11 NOTE — ED PROVIDER NOTE - OBJECTIVE STATEMENT
21 years old male with history of anxiety present complaint of left-sided flank pain for about 2 days.  Denies fall or injury.  Reports heavy lifting at work.  Pain worsens with any movement.  Denies similar pain in the past.  Patient otherwise denies fever/chill/HA/dizziness/chest pain/palpitation/sob/abd pain/n/v/d/ black stool/bloody stool/urinary sxs

## 2022-08-11 NOTE — ED PROVIDER NOTE - NSFOLLOWUPINSTRUCTIONS_ED_ALL_ED_FT
Musculoskeletal Pain    WHAT YOU NEED TO KNOW:    Muscle pain can be dull, achy, or sharp. You may have pain and tenderness to the touch as well. It can occur anywhere on your body and is often brought on by exercise. Muscle pain may occur from an injury, such as a sprain, tendonitis, or bone fracture. Muscle pain can also be the result of medical conditions, such as polymyositis, fibromyalgia, and connective tissue disorders.     DISCHARGE INSTRUCTIONS:    Self care:   Rest as directed and avoid activity that causes pain. You may be able to return to normal activity when you can move without pain. Follow directions for rest and activity. You are at risk for injury for 3 weeks after your symptoms go away.     Ice your painful muscle area to decrease pain and swelling. Use an ice pack, or put ice in a plastic bag and cover it with a towel. Always put a cloth between the ice and your skin. Apply the ice as often as directed for the first 24 to 48 hours.     Compression with a splint, brace, or elastic bandage helps decrease pain and swelling. This may be needed for muscle pain in arms or legs. A splint, brace, or bandage will also help protect the painful area when you move around.     Elevate a painful arm or leg to reduce swelling and pain. Elevate your limb while you are sitting or lying down. Prop a painful leg on pillows to keep it above the level of your heart.    Medicines:   NSAIDs help decrease swelling and pain or fever. This medicine is available with or without a doctor's order. NSAIDs can cause stomach bleeding or kidney problems in certain people. If you take blood thinner medicine, always ask your healthcare provider if NSAIDs are safe for you. Always read the medicine label and follow directions.    Acetaminophen is used to decrease pain. It is available without a doctor's order. Ask your healthcare provider how much to take and when to take it. Follow directions. Acetaminophen can cause liver damage if not taken correctly. Do not take more than one medicine that contains acetaminophen unless directed.     Muscle relaxers help relax your muscles to decrease pain and muscle spasms.     Steroids may be given to decrease redness, pain, and swelling.    Take your medicine as directed. Contact your healthcare provider if you think your medicine is not helping or if you have side effects. Tell him if you are allergic to any medicine. Keep a list of the medicines, vitamins, and herbs you take. Include the amounts, and when and why you take them. Bring the list or the pill bottles to follow-up visits. Carry your medicine list with you in case of an emergency.    Follow up with your healthcare provider as directed: You may need more tests to help healthcare providers find the cause of your muscle pain. You may need physical therapy to learn muscle strengthening exercises. Write down your questions so you remember to ask them during your visits.     Contact your healthcare provider if:   You have a fever.   You have trouble sleeping because of your pain.   Your painful area becomes more tender, red, and warm to the touch.   You have decreased movement of the painful area.   You have questions or concerns about your condition or care.    Return to the emergency department if:   You have increased severe pain when you move the painful muscle area.   You lose feeling in your painful muscle area.   You have new or worse swelling in the painful area. Your skin may feel tight.   You have increased muscle pain or pain that does not improve with treatment.

## 2022-09-15 ENCOUNTER — EMERGENCY (EMERGENCY)
Facility: HOSPITAL | Age: 22
LOS: 0 days | Discharge: HOME | End: 2022-09-15
Attending: EMERGENCY MEDICINE | Admitting: EMERGENCY MEDICINE

## 2022-09-15 VITALS
OXYGEN SATURATION: 99 % | SYSTOLIC BLOOD PRESSURE: 140 MMHG | RESPIRATION RATE: 16 BRPM | TEMPERATURE: 97 F | HEIGHT: 78 IN | WEIGHT: 315 LBS | DIASTOLIC BLOOD PRESSURE: 84 MMHG | HEART RATE: 69 BPM

## 2022-09-15 DIAGNOSIS — Z90.89 ACQUIRED ABSENCE OF OTHER ORGANS: ICD-10-CM

## 2022-09-15 DIAGNOSIS — Z98.890 OTHER SPECIFIED POSTPROCEDURAL STATES: Chronic | ICD-10-CM

## 2022-09-15 DIAGNOSIS — Z98.1 ARTHRODESIS STATUS: Chronic | ICD-10-CM

## 2022-09-15 DIAGNOSIS — K64.9 UNSPECIFIED HEMORRHOIDS: ICD-10-CM

## 2022-09-15 DIAGNOSIS — F41.9 ANXIETY DISORDER, UNSPECIFIED: ICD-10-CM

## 2022-09-15 DIAGNOSIS — Z90.89 ACQUIRED ABSENCE OF OTHER ORGANS: Chronic | ICD-10-CM

## 2022-09-15 DIAGNOSIS — K62.89 OTHER SPECIFIED DISEASES OF ANUS AND RECTUM: ICD-10-CM

## 2022-09-15 DIAGNOSIS — F17.200 NICOTINE DEPENDENCE, UNSPECIFIED, UNCOMPLICATED: ICD-10-CM

## 2022-09-15 PROCEDURE — 99283 EMERGENCY DEPT VISIT LOW MDM: CPT

## 2022-09-15 NOTE — ED PROVIDER NOTE - PATIENT PORTAL LINK FT
You can access the FollowMyHealth Patient Portal offered by [facility] by registering at the following website: [email or web address]. By joining [facility]’s FollowMyHealth portal, you will also be able to view your health information using other applications (apps) compatible with our system.

## 2022-09-15 NOTE — ED ADULT NURSE NOTE - NSIMPLEMENTINTERV_GEN_ALL_ED
Implemented All Universal Safety Interventions:  [place] to call system. Call bell, personal items and telephone within reach. Instruct patient to call for assistance. Room bathroom lighting operational. Non-slip footwear when patient is off stretcher. Physically safe environment: no spills, clutter or unnecessary equipment. Stretcher in lowest position, wheels locked, appropriate side rails in place. normal...

## 2022-09-15 NOTE — ED PROVIDER NOTE - ATTENDING CONTRIBUTION TO CARE
21-year-old male denies significant PMH, chronic lower abdominal pain (undergoing outpatient work-up with GI), now presents with acute onset rectal discomfort since yesterday, persistent but improving, denies modifying factors, chronic loose stools, denies hematemesis, coffee grounds, melena, BRBPR, anorexia, fever, weakness, dizziness, light-headedness, chest pain, palpitations, near syncope or syncope, SOB, n/v/d, hematemesis or other associated complaints at present.     Old chart reviewed.  I have reviewed and agree with the initial nursing note, except as documented in my note.    VSS, awake, alert, no scleral icterus, oropharynx clear, mmm, no jaundice, skin rash or lesions, chest CTAB, non-labored breathing, no w/r/r, +S1/S2, RRR, no m/r/g, abdomen soft, NT, +BS, no hernias or distention, no pulsatile masses or bruits appreciated, no CVA tenderness, no gross blood, + ext hemorrhoids (non-thrombosed), skin clear, no signs infection / abscess / trauma, no peripheral edema or deformities, alert, clear speech and steady gait.

## 2022-09-15 NOTE — ED PROVIDER NOTE - PROGRESS NOTE DETAILS
CP: offered patient CT scan but he stated he has an appointment for 9/23 and is okay waiting. given strict return precautions and follow up with GI. he verbalized understanding and is agreeable to plan.

## 2022-09-15 NOTE — ED ADULT TRIAGE NOTE - MODE OF ARRIVAL
Johanna Bahena is a 61year old female. Patient presents with:   Anxiety: f/u unable to focus    HPI:   Patient came to see me thing that I am a neurologist.  I discussed with  patient and afterwards only I came to know that she came to see me thinking that '96    Vaginal forceps in ;  x3      Social History:  Social History    Tobacco Use      Smoking status: Never Smoker      Smokeless tobacco: Never Used    Alcohol use:  Yes      Alcohol/week: 0.0 standard drinks      Comment: 1 glass of wine weekly Walk in

## 2022-09-15 NOTE — ED PROVIDER NOTE - PHYSICAL EXAMINATION
CONSTITUTIONAL: obese  SKIN: warm, dry  HEAD: Normocephalic; atraumatic.  EYES: no conjunctival injection  CARD: S1, S2 normal; Regular rate and rhythm.   RESP: No wheezes, rales or rhonchi.  ABD: soft nd. +mild LLQ tenderness  EXT: Normal ROM.  No clubbing, cyanosis or edema.   : +hemorrhoids on rectal exam without bleeding, no surrounding ecchymosis or signs of trauma  NEURO: Alert, oriented, grossly unremarkable.  PSYCH: Cooperative, appropriate.

## 2022-09-15 NOTE — ED PROVIDER NOTE - OBJECTIVE STATEMENT
22 yo male, no PMHx, presents with rectal pain x2 days, no alleviating or aggravating factors, no associated symptoms. Patient has also had intermittent diarrhea and lower abdominal pain for the last couple months that he follow with GI for and is scheduled to have a CT scan on 9/23. Denies fevers, chills, rectal bleeding, trauma, testicular pain, nausea, vomiting.

## 2022-09-15 NOTE — ED PROVIDER NOTE - NSFOLLOWUPINSTRUCTIONS_ED_ALL_ED_FT
Hemorrhoids    WHAT YOU NEED TO KNOW:    What are hemorrhoids? Hemorrhoids are swollen blood vessels inside your rectum (internal hemorrhoids) or on your anus (external hemorrhoids). Sometimes a hemorrhoid may prolapse. This means it extends out of your anus.    What increases my risk for hemorrhoids?   •Pregnancy or obesity      •Straining or sitting for a long time during bowel movements      •Liver disease      •Weak muscles around the anus caused by older age, rectal surgery, or anal intercourse      •A lack of physical activity      •Chronic diarrhea or constipation      •A low-fiber diet      What are the signs and symptoms of hemorrhoids?   •Pain or itching around your anus or inside your rectum      •Swelling or bumps around your anus      •Bright red blood in your bowel movement, on the toilet paper, or in the toilet bowl      •Tissue bulging out of your anus (prolapsed hemorrhoids)      •Incontinence (poor control over urine or bowel movements)      How are hemorrhoids diagnosed? Your healthcare provider will ask about your symptoms, the foods you eat, and your bowel movements. He or she will examine your anus for external hemorrhoids. You may need the following:   •A digital rectal exam is a test to check for hemorrhoids. Your healthcare provider will put a gloved finger inside your anus to feel for the hemorrhoids.       •An anoscopy is a test that uses a scope (small tube with a light and camera on the end) to look at your hemorrhoids.      How are hemorrhoids treated? Treatment will depend on your symptoms. You may need any of the following:   •Medicines can help decrease pain and swelling, and soften your bowel movement. The medicine may be a pill, pad, cream, or ointment.      •Procedures may be used to shrink or remove your hemorrhoid. Examples include rubber-band ligation, sclerotherapy, and photocoagulation. These procedures may be done in your healthcare provider's office. Ask your healthcare provider for more information about these procedures.       •Surgery may be needed to shrink or remove your hemorrhoids.       How can I manage my symptoms?   •Apply ice on your anus for 15 to 20 minutes every hour or as directed. Use an ice pack, or put crushed ice in a plastic bag. Cover it with a towel before you apply it to your anus. Ice helps prevent tissue damage and decreases swelling and pain.      •Take a sitz bath. Fill a bathtub with 4 to 6 inches of warm water. You may also use a sitz bath pan that fits inside a toilet bowl. Sit in the sitz bath for 15 minutes. Do this 3 times a day, and after each bowel movement. The warm water can help decrease pain and swelling.       •Keep your anal area clean. Gently wash the area with warm water daily. Soap may irritate the area. After a bowel movement, wipe with moist towelettes or wet toilet paper. Dry toilet paper can irritate the area.       How can I help prevent hemorrhoids?   •Do not strain to have a bowel movement. Do not sit on the toilet too long. These actions can increase pressure on the tissues in your rectum and anus.       •Drink plenty of liquids. Liquids can help prevent constipation. Ask how much liquid to drink each day and which liquids are best for you.       •Eat a variety of high-fiber foods. Examples include fruits, vegetables, and whole grains. Ask your healthcare provider how much fiber you need each day. You may need to take a fiber supplement.              •Exercise as directed. Exercise, such as walking, may make it easier to have a bowel movement. Ask your healthcare provider to help you create an exercise plan.       •Do not have anal sex. Anal sex can weaken the skin around your rectum and anus.       •Avoid heavy lifting. This can cause straining and increase your risk for another hemorrhoid.       When should I seek immediate care?   •You have severe pain in your rectum or around your anus.      •You have severe pain in your abdomen and you are vomiting.       •You have bleeding from your anus that soaks through your underwear.       When should I contact my healthcare provider?   •You have frequent and painful bowel movements.      •Your hemorrhoid looks or feels more swollen than usual.       •You do not have a bowel movement for 2 days or more.       •You see or feel tissue coming through your anus.       •You have questions or concerns about your condition or care.      CARE AGREEMENT:    You have the right to help plan your care. Learn about your health condition and how it may be treated. Discuss treatment options with your healthcare providers to decide what care you want to receive. You always have the right to refuse treatment.        © Copyright [employer] 2022

## 2022-09-15 NOTE — ED PROVIDER NOTE - NS ED ROS FT
GEN:  no fever, no chills, no generalized weakness  NEURO:  no headache, no dizziness  ENT: no sore throat, no runny nose  CV:  no chest pain, no palpitations  RESP:  no sob, no cough  GI:  no nausea, no vomiting, +abdominal pain, +diarrhea  :  no dysuria, no urinary frequency, no hematuria, +rectal pain  MSK:  no joint pain, no edema  SKIN:  no rash, no bruising

## 2022-09-15 NOTE — ED ADULT NURSE NOTE - BREATHING, MLM
8/13/2018        RE: Tye Bertrand  [facility]  [address]  [facility] [address]          [facility]    HPI:    Tye Bertrand is a 77 year old  (1940), who is being seen today for an episodic care visit at [facility].   HPI information obtained from: facility chart records, facility staff and patient report. Today's concern is INR/Coumadin management for A. Fib    Bleeding Signs/Symptoms:  None  Thromboembolic Signs/Symptoms:  None    Medication Changes:  No  Dietary Changes:  No  Activity Changes: No  Bacterial/Viral Infection:  No    Missed Coumadin Doses:  None    On ASA: No    Other Concerns:  No    OBJECTIVE:    INR Today: 2.08  Current Dose:  6 mg by mouth at bedtime every Sun, Mon, Wed, Fri, Sat and 5.5 mg by mouth at bedtime every Tue, Thu    ASSESSMENT:  (Z51.81,  Z79.01) Encounter for monitoring coumadin therapy  (primary encounter diagnosis)    Therapeutic INR for goal of 2-3    PLAN:    New Dose: 6 mg by mouth at bedtime every Sun, Mon, Wed, Thurs,  Fri, Sat and 5.5 mg by mouth at bedtime every Tue      Next INR: 9/7/18      Electronically signed by:  SONA Banks CNP        Sincerely,        SONA Banks CNP    
Spontaneous, unlabored and symmetrical

## 2022-09-15 NOTE — ED PROVIDER NOTE - CARE PROVIDER_API CALL
Chase Gamez (DO)  Gastroenterology  [address] [place] [address]  Phone: [phone]  Fax: [phone]  Follow Up Time: 1-3 Days

## 2022-09-26 ENCOUNTER — EMERGENCY (EMERGENCY)
Facility: HOSPITAL | Age: 22
LOS: 0 days | Discharge: HOME | End: 2022-09-26
Attending: EMERGENCY MEDICINE | Admitting: EMERGENCY MEDICINE

## 2022-09-26 VITALS
WEIGHT: 315 LBS | HEIGHT: 78 IN | OXYGEN SATURATION: 100 % | TEMPERATURE: 98 F | HEART RATE: 102 BPM | DIASTOLIC BLOOD PRESSURE: 82 MMHG | RESPIRATION RATE: 20 BRPM | SYSTOLIC BLOOD PRESSURE: 167 MMHG

## 2022-09-26 VITALS
DIASTOLIC BLOOD PRESSURE: 62 MMHG | OXYGEN SATURATION: 100 % | HEART RATE: 88 BPM | RESPIRATION RATE: 18 BRPM | SYSTOLIC BLOOD PRESSURE: 120 MMHG

## 2022-09-26 DIAGNOSIS — M79.622 PAIN IN LEFT UPPER ARM: ICD-10-CM

## 2022-09-26 DIAGNOSIS — Z98.1 ARTHRODESIS STATUS: ICD-10-CM

## 2022-09-26 DIAGNOSIS — Z98.1 ARTHRODESIS STATUS: Chronic | ICD-10-CM

## 2022-09-26 DIAGNOSIS — Z88.5 ALLERGY STATUS TO NARCOTIC AGENT: ICD-10-CM

## 2022-09-26 DIAGNOSIS — F41.9 ANXIETY DISORDER, UNSPECIFIED: ICD-10-CM

## 2022-09-26 DIAGNOSIS — R06.02 SHORTNESS OF BREATH: ICD-10-CM

## 2022-09-26 DIAGNOSIS — M79.602 PAIN IN LEFT ARM: ICD-10-CM

## 2022-09-26 DIAGNOSIS — M94.0 CHONDROCOSTAL JUNCTION SYNDROME [TIETZE]: ICD-10-CM

## 2022-09-26 DIAGNOSIS — Z98.890 OTHER SPECIFIED POSTPROCEDURAL STATES: ICD-10-CM

## 2022-09-26 DIAGNOSIS — Z98.890 OTHER SPECIFIED POSTPROCEDURAL STATES: Chronic | ICD-10-CM

## 2022-09-26 DIAGNOSIS — Z90.89 ACQUIRED ABSENCE OF OTHER ORGANS: Chronic | ICD-10-CM

## 2022-09-26 DIAGNOSIS — Z90.89 ACQUIRED ABSENCE OF OTHER ORGANS: ICD-10-CM

## 2022-09-26 DIAGNOSIS — R07.9 CHEST PAIN, UNSPECIFIED: ICD-10-CM

## 2022-09-26 LAB
ALBUMIN SERPL ELPH-MCNC: 4.6 G/DL — SIGNIFICANT CHANGE UP (ref 3.5–5.2)
ALP SERPL-CCNC: 56 U/L — SIGNIFICANT CHANGE UP (ref 30–115)
ALT FLD-CCNC: 26 U/L — SIGNIFICANT CHANGE UP (ref 0–41)
ANION GAP SERPL CALC-SCNC: 9 MMOL/L — SIGNIFICANT CHANGE UP (ref 7–14)
APTT BLD: 35.6 SEC — SIGNIFICANT CHANGE UP (ref 27–39.2)
AST SERPL-CCNC: 18 U/L — SIGNIFICANT CHANGE UP (ref 0–41)
BASOPHILS # BLD AUTO: 0.04 K/UL — SIGNIFICANT CHANGE UP (ref 0–0.2)
BASOPHILS NFR BLD AUTO: 0.3 % — SIGNIFICANT CHANGE UP (ref 0–1)
BILIRUB SERPL-MCNC: 0.6 MG/DL — SIGNIFICANT CHANGE UP (ref 0.2–1.2)
BUN SERPL-MCNC: 16 MG/DL — SIGNIFICANT CHANGE UP (ref 10–20)
CALCIUM SERPL-MCNC: 9.4 MG/DL — SIGNIFICANT CHANGE UP (ref 8.4–10.5)
CHLORIDE SERPL-SCNC: 106 MMOL/L — SIGNIFICANT CHANGE UP (ref 98–110)
CO2 SERPL-SCNC: 26 MMOL/L — SIGNIFICANT CHANGE UP (ref 17–32)
CREAT SERPL-MCNC: 1 MG/DL — SIGNIFICANT CHANGE UP (ref 0.7–1.5)
EGFR: 110 ML/MIN/1.73M2 — SIGNIFICANT CHANGE UP
EOSINOPHIL # BLD AUTO: 0.11 K/UL — SIGNIFICANT CHANGE UP (ref 0–0.7)
EOSINOPHIL NFR BLD AUTO: 1 % — SIGNIFICANT CHANGE UP (ref 0–8)
GLUCOSE SERPL-MCNC: 103 MG/DL — HIGH (ref 70–99)
HCT VFR BLD CALC: 48.1 % — SIGNIFICANT CHANGE UP (ref 42–52)
HGB BLD-MCNC: 16.1 G/DL — SIGNIFICANT CHANGE UP (ref 14–18)
IMM GRANULOCYTES NFR BLD AUTO: 0.3 % — SIGNIFICANT CHANGE UP (ref 0.1–0.3)
INR BLD: 1.08 RATIO — SIGNIFICANT CHANGE UP (ref 0.65–1.3)
LYMPHOCYTES # BLD AUTO: 18.2 % — LOW (ref 20.5–51.1)
LYMPHOCYTES # BLD AUTO: 2.08 K/UL — SIGNIFICANT CHANGE UP (ref 1.2–3.4)
MCHC RBC-ENTMCNC: 29.1 PG — SIGNIFICANT CHANGE UP (ref 27–31)
MCHC RBC-ENTMCNC: 33.5 G/DL — SIGNIFICANT CHANGE UP (ref 32–37)
MCV RBC AUTO: 86.8 FL — SIGNIFICANT CHANGE UP (ref 80–94)
MONOCYTES # BLD AUTO: 1.29 K/UL — HIGH (ref 0.1–0.6)
MONOCYTES NFR BLD AUTO: 11.3 % — HIGH (ref 1.7–9.3)
NEUTROPHILS # BLD AUTO: 7.89 K/UL — HIGH (ref 1.4–6.5)
NEUTROPHILS NFR BLD AUTO: 68.9 % — SIGNIFICANT CHANGE UP (ref 42.2–75.2)
NRBC # BLD: 0 /100 WBCS — SIGNIFICANT CHANGE UP (ref 0–0)
PLATELET # BLD AUTO: 249 K/UL — SIGNIFICANT CHANGE UP (ref 130–400)
POTASSIUM SERPL-MCNC: 4.1 MMOL/L — SIGNIFICANT CHANGE UP (ref 3.5–5)
POTASSIUM SERPL-SCNC: 4.1 MMOL/L — SIGNIFICANT CHANGE UP (ref 3.5–5)
PROT SERPL-MCNC: 6.7 G/DL — SIGNIFICANT CHANGE UP (ref 6–8)
PROTHROM AB SERPL-ACNC: 12.4 SEC — SIGNIFICANT CHANGE UP (ref 9.95–12.87)
RBC # BLD: 5.54 M/UL — SIGNIFICANT CHANGE UP (ref 4.7–6.1)
RBC # FLD: 13.1 % — SIGNIFICANT CHANGE UP (ref 11.5–14.5)
SODIUM SERPL-SCNC: 141 MMOL/L — SIGNIFICANT CHANGE UP (ref 135–146)
TROPONIN T SERPL-MCNC: <0.01 NG/ML — SIGNIFICANT CHANGE UP
WBC # BLD: 11.45 K/UL — HIGH (ref 4.8–10.8)
WBC # FLD AUTO: 11.45 K/UL — HIGH (ref 4.8–10.8)

## 2022-09-26 PROCEDURE — 71045 X-RAY EXAM CHEST 1 VIEW: CPT | Mod: 26

## 2022-09-26 PROCEDURE — 93971 EXTREMITY STUDY: CPT | Mod: 26,LT

## 2022-09-26 PROCEDURE — 93010 ELECTROCARDIOGRAM REPORT: CPT

## 2022-09-26 PROCEDURE — 99285 EMERGENCY DEPT VISIT HI MDM: CPT

## 2022-09-26 PROCEDURE — 71275 CT ANGIOGRAPHY CHEST: CPT | Mod: 26,MA

## 2022-09-26 RX ORDER — KETOROLAC TROMETHAMINE 30 MG/ML
30 SYRINGE (ML) INJECTION ONCE
Refills: 0 | Status: DISCONTINUED | OUTPATIENT
Start: 2022-09-26 | End: 2022-09-26

## 2022-09-26 RX ADMIN — Medication 30 MILLIGRAM(S): at 17:24

## 2022-09-26 NOTE — ED PROVIDER NOTE - PATIENT PORTAL LINK FT
You can access the FollowMyHealth Patient Portal offered by [facility] by registering at the following website: [email or web address]. By joining [employer] [facility] [employer]’s FollowMyHealth portal, you will also be able to view your health information using other applications (apps) compatible with our system.

## 2022-09-26 NOTE — ED PROVIDER NOTE - NS ED ROS FT
Review of Systems:  	•	CONSTITUTIONAL - no fever, no diaphoresis, no chills  	•	SKIN - no rash  	•	HEMATOLOGIC - no bleeding, no bruising  	•	EYES - no eye pain, no blurry vision  	•	ENT - no change in hearing, no sore throat, no ear pain or tinnitus  	•	RESPIRATORY - no shortness of breath, no cough  	•	CARDIAC -  chest pain, no palpitations  	•	GI - no abd pain, no nausea, no vomiting, no diarrhea, no constipation

## 2022-09-26 NOTE — ED PROVIDER NOTE - CLINICAL SUMMARY MEDICAL DECISION MAKING FREE TEXT BOX
21 y.o. male, PMH of cervical fusion complicated by meningitis, had PICC line in LUE, since then intermittent discomfort in LUE. Comes in today because pain in LUE is getting worse. Pain is worse when arm is down, better when he elevates it. Today also developed radiation of pain into his CP associated with SOB. No fever/chills, abdominal pain, n/v/c/d, diaphoresis. No leg pain or swelling. On exam, pt in NAD, AAOx3, head NC/AT, CN II-XII intact, PEERL, EOMi, neck (-) midline tenderness, lungs CTA B/L, CV S1S2 regular, chest (+) TTP over rib 6 on the left, abdomen soft/NT/ND/(+)BS, ext (-) edema, motor 5/5x4, sensation intact, ambulating with steady gait. Labs, CT, XR done and reviewed. Most likely MSK. Will d/c with PMD follow up.

## 2022-09-26 NOTE — ED PROVIDER NOTE - PHYSICAL EXAMINATION
--EXAM--  VITAL SIGNS: I have reviewed vs documented at present.  CONSTITUTIONAL: Well-developed; well-nourished; in no acute distress.     NECK: Supple; non tender.  CARD: S1, S2, Regular rate and rhythm. there is tenderness to left side of chest   RESP: No wheezes, rales or rhonchi.

## 2022-09-26 NOTE — ED PROVIDER NOTE - ATTENDING APP SHARED VISIT CONTRIBUTION OF CARE
21 y.o. male, PMH of cervical fusion complicated by meningitis, had PICC line in LUE, since then intermittent discomfort in LUE. Comes in today because pain in LUE is getting worse. Pain is worse when arm is down, better when he elevates it. Today also developed radiation of pain into his CP associated with SOB. No fever/chills, abdominal pain, n/v/c/d, diaphoresis. No leg pain or swelling. On exam, pt in NAD, AAOx3, head NC/AT, CN II-XII intact, PEERL, EOMi, neck (-) midline tenderness, lungs CTA B/L, CV S1S2 regular, chest (+) TTP over rib 6 on the left, abdomen soft/NT/ND/(+)BS, ext (-) edema, motor 5/5x4, sensation intact, ambulating with steady gait. Will do labs, CT, XR, and reevaluate.

## 2022-10-29 ENCOUNTER — NON-APPOINTMENT (OUTPATIENT)
Age: 22
End: 2022-10-29

## 2022-11-03 ENCOUNTER — EMERGENCY (EMERGENCY)
Facility: HOSPITAL | Age: 22
LOS: 0 days | Discharge: HOME | End: 2022-11-03
Attending: EMERGENCY MEDICINE | Admitting: EMERGENCY MEDICINE

## 2022-11-03 VITALS
DIASTOLIC BLOOD PRESSURE: 94 MMHG | OXYGEN SATURATION: 98 % | HEIGHT: 78 IN | TEMPERATURE: 98 F | WEIGHT: 315 LBS | RESPIRATION RATE: 16 BRPM | HEART RATE: 86 BPM | SYSTOLIC BLOOD PRESSURE: 166 MMHG

## 2022-11-03 DIAGNOSIS — Z90.09 ACQUIRED ABSENCE OF OTHER PART OF HEAD AND NECK: ICD-10-CM

## 2022-11-03 DIAGNOSIS — Z88.5 ALLERGY STATUS TO NARCOTIC AGENT: ICD-10-CM

## 2022-11-03 DIAGNOSIS — R07.0 PAIN IN THROAT: ICD-10-CM

## 2022-11-03 DIAGNOSIS — Z98.1 ARTHRODESIS STATUS: Chronic | ICD-10-CM

## 2022-11-03 DIAGNOSIS — R05.9 COUGH, UNSPECIFIED: ICD-10-CM

## 2022-11-03 DIAGNOSIS — J02.9 ACUTE PHARYNGITIS, UNSPECIFIED: ICD-10-CM

## 2022-11-03 DIAGNOSIS — Z98.890 OTHER SPECIFIED POSTPROCEDURAL STATES: Chronic | ICD-10-CM

## 2022-11-03 DIAGNOSIS — F41.9 ANXIETY DISORDER, UNSPECIFIED: ICD-10-CM

## 2022-11-03 DIAGNOSIS — Z90.89 ACQUIRED ABSENCE OF OTHER ORGANS: Chronic | ICD-10-CM

## 2022-11-03 PROCEDURE — 99283 EMERGENCY DEPT VISIT LOW MDM: CPT

## 2022-11-03 RX ORDER — HYDROXYZINE HCL 10 MG
0 TABLET ORAL
Qty: 0 | Refills: 0 | DISCHARGE

## 2022-11-03 RX ORDER — CLONAZEPAM 1 MG
0 TABLET ORAL
Qty: 0 | Refills: 0 | DISCHARGE

## 2022-11-03 NOTE — ED ADULT NURSE NOTE - NSIMPLEMENTINTERV_GEN_ALL_ED
Implemented All Universal Safety Interventions:  [place] to call system. Call bell, personal items and telephone within reach. Instruct patient to call for assistance. Room bathroom lighting operational. Non-slip footwear when patient is off stretcher. Physically safe environment: no spills, clutter or unnecessary equipment. Stretcher in lowest position, wheels locked, appropriate side rails in place.

## 2022-11-03 NOTE — ED PROVIDER NOTE - PATIENT PORTAL LINK FT
You can access the FollowMyHealth Patient Portal offered by [facility] by registering at the following website: [email or web address]. By joining [employer]’s FollowMyHealth portal, you will also be able to view your health information using other applications (apps) compatible with our system.

## 2022-11-03 NOTE — ED PROVIDER NOTE - CLINICAL SUMMARY MEDICAL DECISION MAKING FREE TEXT BOX
22 yo man w/ hx of anxiety here for evaluation of throat pain and sensation of closing. States has been feeling burning drip in back of throat, mild cough. no fevers, no nasal congestion. No hx of allergies in past or allergic reactions. no rash.  states initially just felt the drip in back of throat and did feel a little anxious about it  no cp, sob, chavis, palpitations    wd/wn  nad  no incr RR or WOB  no trismus  no anterior neck ttp  no cervical LAD  OP + slight erythema, no tonsillar swelling (tonsils aout as child), uvula midline. no posterior swelling  rrr s1s2 wnl  cta b/l  nt/nd + BS  aao X 3    22 yo man w/ mild pharyngitis likely viral in nature or due to post-nasal drip  supportive care, d/c home w/ outpatient f/u

## 2022-11-03 NOTE — ED PROVIDER NOTE - NS ED ROS FT
Constitutional: no fever, chills, no recent weight loss, change in appetite or malaise  Eyes: no redness/discharge/pain/vision changes  ENT: + throat pain. no rhinorrhea/ear pain  Cardiac: No chest pain, SOB or edema.  Respiratory: No cough or respiratory distress  GI: No nausea, vomiting, diarrhea or abdominal pain.  : No dysuria, frequency, urgency or hematuria  MS: no pain to back or extremities, no loss of ROM, no weakness  Neuro: No headache or weakness. No LOC.  Skin: No skin rash.  Endocrine: No history of thyroid disease or diabetes.  Except as documented in the HPI, all other systems are negative.

## 2022-11-03 NOTE — ED PROVIDER NOTE - CARE PROVIDER_API CALL
Gavin Castaneda)  Otolaryngology  [address] [place] [address], [address] [place] [address]  Phone: [phone]  Fax: [phone]  Follow Up Time:

## 2022-11-03 NOTE — ED PROVIDER NOTE - OBJECTIVE STATEMENT
21-year-old male past medical history of anxiety presenting with 4 days of throat pain and tight sensation.  Patient admits to postnasal drip/burning and mild cough.  Went to PMD times few days ago and was instructed to take NSAIDs.  No fever, chills, voice change, difficulty speaking/hoarseness, difficulty swallowing or tolerating oral secretions, chest pain, sob, abd pain, nausea/vomiting.

## 2022-11-03 NOTE — ED PROVIDER NOTE - PHYSICAL EXAMINATION
CONSTITUTIONAL: Well-appearing; well-nourished; in no apparent distress.   EYES: PERRL; EOM intact.   ENT: normal nose; no rhinorrhea; normal pharynx with no tonsillar hypertrophy. no tonsillar or uvular ayan. uvula midline. Pt speaking full sentences.   NECK: Supple; non-tender; no cervical lymphadenopathy.   CARDIOVASCULAR: Normal S1, S2; no murmurs, rubs, or gallops.   RESPIRATORY: Normal chest excursion with respiration; breath sounds clear and equal bilaterally; no wheezes, rhonchi, or rales.  GI/: Normal bowel sounds; non-distended; non-tender; no palpable organomegaly.   MS: No evidence of trauma or deformity. Normal ROM in all four extremities; non-tender to palpation; distal pulses are normal.   SKIN: Normal for age and race; warm; dry; good turgor; no apparent lesions or exudate.   NEURO/PSYCH: A & O x 4; grossly unremarkable. mood and manner are appropriate.

## 2022-11-03 NOTE — ED ADULT TRIAGE NOTE - ACCOMPANIED BY
Clinic Care Coordination Contact  OUTREACH    Referral Information:  Referral Source: CTS  Reason for Contact: Follow up         Universal Utilization:   ED Visits in last year: 2  Hospital visits in last year: 2  Last PCP appointment: 05/08/17  Missed Appointments: 0  Concerns: none  Multiple Providers or Specialists: Yes (Rheumatology, dermatology, pulmonology)    Clinical Concerns:  Current Medical Concerns: Patient initially followed by RN CCC for concern for pleurisy. Patient has since been cleared by pulmonology and ENT, felt symptoms are r/t allergies.  Symptoms are much improved since last contact.      Current Behavioral Concerns: none    Education Provided to patient: 24 hour nurse line.    Clinical Pathway Name: None    Medication Management:  No recent changes.  No longer on coumadin.    Functional Status:  Mobility Status: Independent     Transportation: drives self    Psychosocial:  Current living arrangement:: lives in a private home  Financial/Insurance: BCBS/Medicare  No gaps identified     Resources and Interventions:  Current Resources: DME ([employer]);          Advanced Care Plans/Directives on file:: Yes       Patient/Caregiver understanding: Patient had no concerns or questions, denied need for care coordination at this time.   Frequency of Care Coordination: PRN  Upcoming appointment: 07/31/17 (Allergy)     Plan:   Will close to RN clinic care coordination at this time, no further needs identified.   A new CC referral may be placed if future needs arise.     Tamiko Knight, LUCILLEN, RN, PHN  Clinic Care Coordinator  [facility]  [phone]      
Self

## 2022-11-03 NOTE — ED PROVIDER NOTE - NSFOLLOWUPINSTRUCTIONS_ED_ALL_ED_FT
Please follow up with your primary care doctor within one week. Please follow up with your primary care doctor within one week.      Postnasal Drip      Postnasal drip is the feeling of mucus going down the back of your throat. Mucus is a slimy substance that moistens and cleans your nose and throat, as well as the air pockets in face bones near your forehead and cheeks (sinuses). Small amounts of mucus pass from your nose and sinuses down the back of your throat all the time. This is normal. When you produce too much mucus or the mucus gets too thick, you can feel it.    Some common causes of postnasal drip include:•Having more mucus because of:  •A cold or the flu.      •Allergies.      •Cold air.      •Certain medicines.      •Having more mucus that is thicker because of:  •A sinus or nasal infection.      •Dry air.      •A food allergy.          Follow these instructions at home:      Relieving discomfort      •Gargle with a salt-water mixture 3–4 times a day or as needed. To make a salt-water mixture, completely dissolve ½–1 tsp of salt in 1 cup of warm water.      •If the air in your home is dry, use a humidifier to add moisture to the air.      •Use a saline spray or container (neti pot) to flush out the nose (nasal irrigation). These methods can help clear away mucus and keep the nasal passages moist.      General instructions     •Take over-the-counter and prescription medicines only as told by your health care provider.      •Follow instructions from your health care provider about eating or drinking restrictions. You may need to avoid caffeine.      •Avoid things that you know you are allergic to (allergens), like dust, mold, pollen, pets, or certain foods.      •Drink enough fluid to keep your urine pale yellow.      •Keep all follow-up visits as told by your health care provider. This is important.        Contact a health care provider if:    •You have a fever.      •You have a sore throat.      •You have difficulty swallowing.      •You have headache.      •You have sinus pain.      •You have a cough that does not go away.      •The mucus from your nose becomes thick and is green or yellow in color.      •You have cold or flu symptoms that last more than 10 days.        Summary    •Postnasal drip is the feeling of mucus going down the back of your throat.      •If your health care provider approves, use nasal irrigation or a nasal spray 2?4 times a day.      •Avoid things that you know you are allergic to (allergens), like dust, mold, pollen, pets, or certain foods.

## 2022-11-04 NOTE — ED ADULT NURSE NOTE - NS ED NURSE DC INFO COMPLEXITY
Chief Complaint   Patient presents with    Ear Problem     Patient states that his left ear hurt and it started last Friday. HPI:  Ulices Allen is a 10 y.o. male seen in follow-up for his ears. He is s/p BMT back on 12/2/21. Last seen on 5/14/21 and both tubes were still in place and working well at that time. He p/w R sided otalgia and otorrhea last wk, and was seen at [facility] office and staretd on Amoc and ear drops. His ear feels better today but there still seems to be some moisture in that ear. No sxs on L side. Of note, this interview was performed w/ help of Hebrew-language interpretor. Past Medical History, Past Surgical History, Family history, Social History, and Medications were all reviewed with the patient today and updated as necessary. No Known Allergies  There is no problem list on file for this patient. Current Outpatient Medications   Medication Sig    ciprofloxacin-dexamethasone (CIPRODEX) 0.3-0.1 % otic suspension Place 4 drops in ear(s) 2 times daily     No current facility-administered medications for this visit.      Past Medical History:   Diagnosis Date    Asthma     COME (chronic otitis media with effusion)     Diaper dermatitis     GERD (gastroesophageal reflux disease)     Leucocytosis     Poor appetite     Toe deformity      Social History     Tobacco Use    Smoking status: Never    Smokeless tobacco: Never   Substance Use Topics    Alcohol use: No     Past Surgical History:   Procedure Laterality Date    ADENOIDECTOMY  08/15/2019    BMT/adenoidectomy- Sourav Barters    TYMPANOSTOMY TUBE PLACEMENT Bilateral 12/02/2021    BMT- Sourav Barters    TYMPANOSTOMY TUBE PLACEMENT Bilateral 11/15/2018    BMT- Sourav Bubbaers     Family History   Problem Relation Age of Onset    No Known Problems Paternal Grandfather     Other Neg Hx         BONE DISEASE & SKIN ISSUES    Allergy (Severe) Neg Hx     No Known Problems Paternal Grandmother     Hypertension Maternal Grandfather     No Known Problems Father     No Known Problems Mother     Hypertension Maternal Grandmother         ROS:    Review of Systems   Constitutional:  Negative for activity change and fever. Respiratory:  Negative for cough. Cardiovascular:  Negative for chest pain. Endocrine: Negative for cold intolerance. Genitourinary:  Negative for urgency. Musculoskeletal:  Negative for neck pain. Allergic/Immunologic: Negative for environmental allergies. Neurological:  Negative for headaches. Hematological:  Negative for adenopathy. Psychiatric/Behavioral:  Negative for behavioral problems. PHYSICAL EXAM:    Wt 62 lb (28.1 kg)     General: NAD, well-appearing  Neuro: No gross neuro deficits. No facial weakness. Eyes: No periorbital edema/ecchymosis. No nystagmus. Skin: No facial erythema, rashes or concerning lesions. Nose: No external deviations or saddling. Intranasally, septum is midline without perforations, nasal mucosa appears healthy with no erythema, mucopurulence, or polyps. Mouth: Moist mucus membranes, normal tongue/palate mobility, no concerning mucosal lesions. Oropharynx clear with no erythema/exudate, no tonsillar hypertrophy. Ears: Normal appearing auricles, no hematomas. R side- tube still in place but starting to lateralize, no drainage or moisture, clear ME space. L side- clear canal, patent and well-positioned RB tube, clear ME space. Neck: Soft, supple, no palpable neck masses. No palpable parotid or submandibular masses. No thyromegaly or palpable thyroid nodules. Lymphatics: No palpable cervical LAD. Resp: No audible stridor or wheezing. CV: No murmurs, no JVD. Extremities: No clubbing or cyanosis. ASSESSMENT and PLAN      ICD-10-CM    1. Tympanostomy tube check  Z45.89         Both tubes were still in place and working well. He has cleared his recent infection on the right side, but the right tube is starting to lateralize and may fall out soon.   They may stop using the eardrops as there was no further drainage. RTC in 4 months for another tube check.     Dorota Olivera MD  11/4/2022    Electronically signed by Dorota Olivera MD on 11/4/2022 at 10:53 AM Simple: Patient demonstrates quick and easy understanding

## 2022-11-13 ENCOUNTER — EMERGENCY (EMERGENCY)
Facility: HOSPITAL | Age: 22
LOS: 0 days | Discharge: HOME | End: 2022-11-13
Attending: EMERGENCY MEDICINE | Admitting: EMERGENCY MEDICINE

## 2022-11-13 VITALS
OXYGEN SATURATION: 100 % | HEART RATE: 88 BPM | DIASTOLIC BLOOD PRESSURE: 88 MMHG | SYSTOLIC BLOOD PRESSURE: 145 MMHG | RESPIRATION RATE: 18 BRPM

## 2022-11-13 VITALS
DIASTOLIC BLOOD PRESSURE: 96 MMHG | SYSTOLIC BLOOD PRESSURE: 148 MMHG | WEIGHT: 315 LBS | TEMPERATURE: 98 F | OXYGEN SATURATION: 96 % | RESPIRATION RATE: 20 BRPM | HEIGHT: 78 IN | HEART RATE: 99 BPM

## 2022-11-13 DIAGNOSIS — I10 ESSENTIAL (PRIMARY) HYPERTENSION: ICD-10-CM

## 2022-11-13 DIAGNOSIS — R51.9 HEADACHE, UNSPECIFIED: ICD-10-CM

## 2022-11-13 DIAGNOSIS — Z88.5 ALLERGY STATUS TO NARCOTIC AGENT: ICD-10-CM

## 2022-11-13 DIAGNOSIS — Z98.890 OTHER SPECIFIED POSTPROCEDURAL STATES: Chronic | ICD-10-CM

## 2022-11-13 DIAGNOSIS — R00.2 PALPITATIONS: ICD-10-CM

## 2022-11-13 DIAGNOSIS — F41.9 ANXIETY DISORDER, UNSPECIFIED: ICD-10-CM

## 2022-11-13 DIAGNOSIS — Z98.1 ARTHRODESIS STATUS: Chronic | ICD-10-CM

## 2022-11-13 DIAGNOSIS — Z90.89 ACQUIRED ABSENCE OF OTHER ORGANS: Chronic | ICD-10-CM

## 2022-11-13 LAB
ALBUMIN SERPL ELPH-MCNC: 4.7 G/DL — SIGNIFICANT CHANGE UP (ref 3.5–5.2)
ALP SERPL-CCNC: 61 U/L — SIGNIFICANT CHANGE UP (ref 30–115)
ALT FLD-CCNC: 34 U/L — SIGNIFICANT CHANGE UP (ref 0–41)
ANION GAP SERPL CALC-SCNC: 11 MMOL/L — SIGNIFICANT CHANGE UP (ref 7–14)
AST SERPL-CCNC: 25 U/L — SIGNIFICANT CHANGE UP (ref 0–41)
BASOPHILS # BLD AUTO: 0.04 K/UL — SIGNIFICANT CHANGE UP (ref 0–0.2)
BASOPHILS NFR BLD AUTO: 0.5 % — SIGNIFICANT CHANGE UP (ref 0–1)
BILIRUB SERPL-MCNC: 1 MG/DL — SIGNIFICANT CHANGE UP (ref 0.2–1.2)
BUN SERPL-MCNC: 17 MG/DL — SIGNIFICANT CHANGE UP (ref 10–20)
CALCIUM SERPL-MCNC: 9.7 MG/DL — SIGNIFICANT CHANGE UP (ref 8.4–10.5)
CHLORIDE SERPL-SCNC: 102 MMOL/L — SIGNIFICANT CHANGE UP (ref 98–110)
CO2 SERPL-SCNC: 24 MMOL/L — SIGNIFICANT CHANGE UP (ref 17–32)
CREAT SERPL-MCNC: 0.9 MG/DL — SIGNIFICANT CHANGE UP (ref 0.7–1.5)
EGFR: 125 ML/MIN/1.73M2 — SIGNIFICANT CHANGE UP
EOSINOPHIL # BLD AUTO: 0.07 K/UL — SIGNIFICANT CHANGE UP (ref 0–0.7)
EOSINOPHIL NFR BLD AUTO: 0.8 % — SIGNIFICANT CHANGE UP (ref 0–8)
GLUCOSE SERPL-MCNC: 106 MG/DL — HIGH (ref 70–99)
HCT VFR BLD CALC: 50.6 % — SIGNIFICANT CHANGE UP (ref 42–52)
HGB BLD-MCNC: 17.5 G/DL — SIGNIFICANT CHANGE UP (ref 14–18)
IMM GRANULOCYTES NFR BLD AUTO: 0.2 % — SIGNIFICANT CHANGE UP (ref 0.1–0.3)
LYMPHOCYTES # BLD AUTO: 2.53 K/UL — SIGNIFICANT CHANGE UP (ref 1.2–3.4)
LYMPHOCYTES # BLD AUTO: 29.1 % — SIGNIFICANT CHANGE UP (ref 20.5–51.1)
MAGNESIUM SERPL-MCNC: 1.9 MG/DL — SIGNIFICANT CHANGE UP (ref 1.8–2.4)
MCHC RBC-ENTMCNC: 29.6 PG — SIGNIFICANT CHANGE UP (ref 27–31)
MCHC RBC-ENTMCNC: 34.6 G/DL — SIGNIFICANT CHANGE UP (ref 32–37)
MCV RBC AUTO: 85.5 FL — SIGNIFICANT CHANGE UP (ref 80–94)
MONOCYTES # BLD AUTO: 0.83 K/UL — HIGH (ref 0.1–0.6)
MONOCYTES NFR BLD AUTO: 9.6 % — HIGH (ref 1.7–9.3)
NEUTROPHILS # BLD AUTO: 5.19 K/UL — SIGNIFICANT CHANGE UP (ref 1.4–6.5)
NEUTROPHILS NFR BLD AUTO: 59.8 % — SIGNIFICANT CHANGE UP (ref 42.2–75.2)
NRBC # BLD: 0 /100 WBCS — SIGNIFICANT CHANGE UP (ref 0–0)
PLATELET # BLD AUTO: 285 K/UL — SIGNIFICANT CHANGE UP (ref 130–400)
POTASSIUM SERPL-MCNC: 4.3 MMOL/L — SIGNIFICANT CHANGE UP (ref 3.5–5)
POTASSIUM SERPL-SCNC: 4.3 MMOL/L — SIGNIFICANT CHANGE UP (ref 3.5–5)
PROT SERPL-MCNC: 7.2 G/DL — SIGNIFICANT CHANGE UP (ref 6–8)
RBC # BLD: 5.92 M/UL — SIGNIFICANT CHANGE UP (ref 4.7–6.1)
RBC # FLD: 12.4 % — SIGNIFICANT CHANGE UP (ref 11.5–14.5)
SODIUM SERPL-SCNC: 137 MMOL/L — SIGNIFICANT CHANGE UP (ref 135–146)
TROPONIN T SERPL-MCNC: <0.01 NG/ML — SIGNIFICANT CHANGE UP
WBC # BLD: 8.68 K/UL — SIGNIFICANT CHANGE UP (ref 4.8–10.8)
WBC # FLD AUTO: 8.68 K/UL — SIGNIFICANT CHANGE UP (ref 4.8–10.8)

## 2022-11-13 PROCEDURE — 71045 X-RAY EXAM CHEST 1 VIEW: CPT | Mod: 26

## 2022-11-13 PROCEDURE — 93010 ELECTROCARDIOGRAM REPORT: CPT

## 2022-11-13 PROCEDURE — 99285 EMERGENCY DEPT VISIT HI MDM: CPT

## 2022-11-13 RX ORDER — SODIUM CHLORIDE 9 MG/ML
1000 INJECTION INTRAMUSCULAR; INTRAVENOUS; SUBCUTANEOUS ONCE
Refills: 0 | Status: COMPLETED | OUTPATIENT
Start: 2022-11-13 | End: 2022-11-13

## 2022-11-13 RX ADMIN — SODIUM CHLORIDE 1000 MILLILITER(S): 9 INJECTION INTRAMUSCULAR; INTRAVENOUS; SUBCUTANEOUS at 12:08

## 2022-11-13 NOTE — ED PROVIDER NOTE - CARE PROVIDER_API CALL
Russell Ha)  Cardiology; Interventional Cardiology  [address] [place] [address]  Phone: [phone]  Fax: [phone]  Follow Up Time:

## 2022-11-13 NOTE — ED PROVIDER NOTE - OBJECTIVE STATEMENT
21-year-old male with past medical history of anxiety, hypertension no longer on medication presenting to ER with 2 weeks of palpitations.  States symptoms occur when standing/activity with associated frontal headache.  Patient states when standing he notes his heart rate and his blood pressure increased. States has been following with cardiologist Dr. Henao and will be placed on Holter monitor in 1 week.  Patient denies any symptoms at rest.  No recent illness, fever, chills, cough, chest pain, shortness of breath, leg pain or swelling, syncope, tobacco/drug use, recent travel.

## 2022-11-13 NOTE — ED ADULT TRIAGE NOTE - NS ED TRIAGE AVPU SCALE
Appears chronic      Alert-The patient is alert, awake and responds to voice. The patient is oriented to time, place, and person. The triage nurse is able to obtain subjective information.

## 2022-11-13 NOTE — ED PROVIDER NOTE - NS ED ROS FT
Constitutional: no fever, chills, no recent weight loss, change in appetite or malaise  Eyes: no redness/discharge/pain/vision changes  ENT: no rhinorrhea/ear pain/sore throat  Cardiac:  +palpitations. No chest pain, SOB or edema.  Respiratory: No cough or respiratory distress  GI: No nausea, vomiting, diarrhea or abdominal pain.  : No dysuria, frequency, urgency or hematuria  MS: no pain to back or extremities, no loss of ROM, no weakness  Neuro: + frontal ha. No LOC.  Skin: No skin rash.  Endocrine: No history of thyroid disease or diabetes.  Except as documented in the HPI, all other systems are negative.

## 2022-11-13 NOTE — ED PROVIDER NOTE - ATTENDING APP SHARED VISIT CONTRIBUTION OF CARE
I was present for and supervised the key and critical aspects of the procedures performed during the care of the patient. Patient is a 21-year-old male presents for evaluation of palpitations onset of symptoms was worse present past states that the symptoms are provoked when going from standing position from a lying position denies any chest pain shortness of breath since chills no recent changes in medicines no over-the-counter counter supplements denies any abdominal pain back pain denies any extremity weakness    n/c at [facility] orohparyx clear cta b/l, rrr s1s2 noted abd-soft nt ndbs+ ext from with no focal deficits at this time.  no rash     a/p- patient presents for evaluation of palpitations we obtained EKG which is not associated with STEMI we obtained labs patient improved here in the emergency department he denies any chest pain back pain abdominal pain not associated with CAD unlikely to be ACS related this is not consistent with PE I will discharge patient has follow-up with cardiology we discussed indications to return to the emergency department

## 2022-11-13 NOTE — ED PROVIDER NOTE - CHILD ABUSE FACILITY
[facility] Counseling: Patient counseled that medicine may cause skin irritation and to avoid applying near the eyes.  In the event of skin irritation, the patient was advised to reduce the amount of the drug applied or use it less frequently.   The patient verbalized understanding of the proper use and possible adverse effects of azelaic acid.  All of the patient's questions and concerns were addressed.

## 2022-11-13 NOTE — ED ADULT TRIAGE NOTE - CHIEF COMPLAINT QUOTE
Pt states "My blood pressure is high.  I went to the cardiologist.  He did not put me on any medication."

## 2022-11-13 NOTE — ED PROVIDER NOTE - CLINICAL SUMMARY MEDICAL DECISION MAKING FREE TEXT BOX
patient presents for evaluation of palpitations we obtained EKG which is not associated with STEMI we obtained labs patient improved here in the emergency department he denies any chest pain back pain abdominal pain not associated with CAD unlikely to be ACS related this is not consistent with PE I will discharge patient has follow-up with cardiology we discussed indications to return to the emergency department

## 2022-11-13 NOTE — ED PROVIDER NOTE - PATIENT PORTAL LINK FT
You can access the FollowMyHealth Patient Portal offered by [facility] by registering at the following website: [email or web address]. By joining [employer]’s FollowMyHealth portal, you will also be able to view your health information using other applications (apps) compatible with our system.

## 2022-11-18 ENCOUNTER — EMERGENCY (EMERGENCY)
Facility: HOSPITAL | Age: 22
LOS: 0 days | Discharge: HOME | End: 2022-11-18
Attending: EMERGENCY MEDICINE | Admitting: EMERGENCY MEDICINE

## 2022-11-18 VITALS
HEART RATE: 78 BPM | OXYGEN SATURATION: 99 % | RESPIRATION RATE: 18 BRPM | SYSTOLIC BLOOD PRESSURE: 132 MMHG | DIASTOLIC BLOOD PRESSURE: 84 MMHG

## 2022-11-18 VITALS
OXYGEN SATURATION: 98 % | TEMPERATURE: 98 F | RESPIRATION RATE: 20 BRPM | DIASTOLIC BLOOD PRESSURE: 84 MMHG | WEIGHT: 315 LBS | SYSTOLIC BLOOD PRESSURE: 138 MMHG | HEIGHT: 78 IN | HEART RATE: 79 BPM

## 2022-11-18 DIAGNOSIS — I10 ESSENTIAL (PRIMARY) HYPERTENSION: ICD-10-CM

## 2022-11-18 DIAGNOSIS — R51.9 HEADACHE, UNSPECIFIED: ICD-10-CM

## 2022-11-18 DIAGNOSIS — Z98.1 ARTHRODESIS STATUS: Chronic | ICD-10-CM

## 2022-11-18 DIAGNOSIS — Z98.890 OTHER SPECIFIED POSTPROCEDURAL STATES: Chronic | ICD-10-CM

## 2022-11-18 DIAGNOSIS — Z88.5 ALLERGY STATUS TO NARCOTIC AGENT: ICD-10-CM

## 2022-11-18 DIAGNOSIS — F41.9 ANXIETY DISORDER, UNSPECIFIED: ICD-10-CM

## 2022-11-18 DIAGNOSIS — Z90.89 ACQUIRED ABSENCE OF OTHER ORGANS: Chronic | ICD-10-CM

## 2022-11-18 PROCEDURE — 70450 CT HEAD/BRAIN W/O DYE: CPT | Mod: 26,MA

## 2022-11-18 PROCEDURE — 99284 EMERGENCY DEPT VISIT MOD MDM: CPT

## 2022-11-18 RX ORDER — METHOCARBAMOL 500 MG/1
1500 TABLET, FILM COATED ORAL ONCE
Refills: 0 | Status: COMPLETED | OUTPATIENT
Start: 2022-11-18 | End: 2022-11-18

## 2022-11-18 RX ORDER — ACETAMINOPHEN 500 MG
650 TABLET ORAL ONCE
Refills: 0 | Status: COMPLETED | OUTPATIENT
Start: 2022-11-18 | End: 2022-11-18

## 2022-11-18 RX ADMIN — METHOCARBAMOL 1500 MILLIGRAM(S): 500 TABLET, FILM COATED ORAL at 18:18

## 2022-11-18 RX ADMIN — Medication 650 MILLIGRAM(S): at 18:18

## 2022-11-18 NOTE — ED PROVIDER NOTE - OBJECTIVE STATEMENT
20 yo male, pmh of htn recently started on metoprolol, presents to ed for ha, right sided, mild, aching, no radiation, [place] [facility] [place]. Denies fever, chills, cp, sob, neck pain, visual changes, nvd, dizziness, numbness, tingling.

## 2022-11-18 NOTE — ED PROVIDER NOTE - PATIENT PORTAL LINK FT
You can access the FollowMyHealth Patient Portal offered by [facility] by registering at the following website: [email or web address]. By joining [employer]’s FollowMyHealth portal, you will also be able to view your health information using other applications (apps) compatible with our system.

## 2022-11-18 NOTE — ED PROVIDER NOTE - PHYSICAL EXAMINATION
Physical Exam    Vital Signs: I have reviewed the initial vital signs.  Constitutional: appears stated age, no acute distress  Eyes: Conjunctiva pink, Sclera clear  Cardiovascular: S1 and S2, regular rate, regular rhythm, well-perfused extremities, radial pulses equal and 2+, pedal pulses 2+ and equal  Respiratory: unlabored respiratory effort, clear to auscultation bilaterally no wheezing, rales and rhonchi  Gastrointestinal: soft, non-tender abdomen, no pulsatile mass, normal bowl sounds  Musculoskeletal: supple neck, no lower extremity edema, no midline tenderness  Integumentary: warm, dry, no rash  Neurologic: awake, alert, cranial nerves II-XII grossly intact, extremities’ motor and sensory functions grossly intact, normal gait, normal speech, finger to nose intact.

## 2022-11-18 NOTE — ED PROVIDER NOTE - CLINICAL SUMMARY MEDICAL DECISION MAKING FREE TEXT BOX
21-year-old male with past medical history of anxiety, high blood pressure, seen in the emergency department on November 13 for palpitations, follows with Dr. Donovan for which she was placed on a Holter monitor for a week, had labs, EKG, chest x-ray done that was negative, presents today for generalized head pressure, mild to moderate in intensity, non radiating, has been gradual in onset for the past 4 days, not sudden, not thunderclap, not the worst headache, and states is not really a headache but more like pressure, associated with dizziness/lightheadedness.  Patient reports she was started on a beta-blocker 4 days ago by his cardiologist so was not sure if this has been causing his symptoms.   Extensive conversation had with patient and mom, aware of results, feeling better, NIHSS 0, no focal deficits.  No signs of infection.  Patient will discuss with cardiologist continuing his beta-blocker.  As well as follow-up with primary care doctor and neurologist.  Copy of results provided.  Aware signs and symptoms to return for.  will follow-up.

## 2022-11-18 NOTE — ED PROVIDER NOTE - ATTENDING APP SHARED VISIT CONTRIBUTION OF CARE
21-year-old male with past medical history of anxiety, high blood pressure, seen in the emergency department on November 13 for palpitations, follows with Dr. Donovan for which she was placed on a Holter monitor for a week, had labs, EKG, chest x-ray done that was negative, presents today for 21-year-old male with past medical history of anxiety, high blood pressure, seen in the emergency department on November 13 for palpitations, follows with Dr. Donovan for which she was placed on a Holter monitor for a week, had labs, EKG, chest x-ray done that was negative, presents today for generalized head pressure, mild to moderate in intensity, non radiating, has been gradual in onset for the past 4 days, not sudden, not thunderclap, not the worst headache, and states is not really a headache but more like pressure, associated with dizziness/lightheadedness.  Patient reports she was started on a beta-blocker 4 days ago by his cardiologist so was not sure if this has been causing his symptoms.  Patient went to an urgent care was sent into the ED for further evaluation.  denies fever, chills, n/v, cp, sob, pleuritic cp,  diaphoresis, cough, tinnitus, ear pain, hearing loss, neck pain/stiffness, back pain, photophobia/phonophobia, blurry vision/visual changes, abd pain, diarrhea, constipation, melena/brbpr, urinary symptoms, weakness, numbness/tingling,  syncope, sick contacts, recent travel or rash. Pt has seen an ent as well and reported no findings, had thryoid checked, and had follow up with neurology in January.     on exam:   Constitutional: non toxic appearing pt sitting on stretcher in nad.  Skin: no rash, no signs of trauma:  HEENT: PERRL, EOM intact, no nystagmus, mmm. No tongue deviation. NO facial pain to palpation including over sinus, no temporal pain to palpation. TM's visualized b/l with no erythema, effusions, or cerumen impaction.  NECK and BACK: neck supple, no spinous ttp to neck or back, FROM, no palpable shelves or step offs, no meningeal signs. Pt reports discomfort to R trapezius. but FROM.   CARDIO: regular rate, radial pulses 2/4 b/l, dp and pt pulses 2/4 b/l.  Lungs: Ctabl w/ breath sounds present b/l, no wheezing or crackles, no accessory muscle use, no tachypnea, no stridor  ABD: BS present throughout all 4 quadrants, abd soft, nd, nt, no rebound tenderness or guarding, no cvat,;  EXT: FROM of upper and lower ext, no drift, no calf pain/swelling/erythema.  NEURO: AAOx3. Motor 5/5 and sensation intact throughout upper and lower ext. CN II-XII intact. No facial droop or slurring of speech. (-) Pronator (-) Romberg, no dysmetria w/ ftn or rapid alternating fine movements, heel to shin intact, ambulating with no ataxia or difficulty, (-) Dior (-) Brudzinski. NIHSS O.    Pan: Pain meds, ct head, reassess.

## 2022-12-07 ENCOUNTER — NON-APPOINTMENT (OUTPATIENT)
Age: 22
End: 2022-12-07

## 2022-12-12 ENCOUNTER — APPOINTMENT (OUTPATIENT)
Dept: PAIN MANAGEMENT | Facility: CLINIC | Age: 22
End: 2022-12-12
Payer: COMMERCIAL

## 2022-12-12 PROBLEM — Z00.00 ENCOUNTER FOR PREVENTIVE HEALTH EXAMINATION: Status: ACTIVE | Noted: 2022-12-12

## 2022-12-12 PROCEDURE — 99204 OFFICE O/P NEW MOD 45 MIN: CPT

## 2022-12-12 NOTE — ASSESSMENT
[FreeTextEntry1] : 22 year old male presenting with neck pain and nonspecific bouts of dizziness. I will work up his symptoms with a MRI of the cervical spine as well as MRI of the brain and EMG of the upper extremities. He will also begin aggressive physical therapy. Follow up in 6 weeks will be made for reassessment.\par \par MRI of the brain ordered to rule out any intracranial structural lesions\par \par MRI of the cervical spine as ordered to delineate spine pathology such as disc displacement and stenosis.\par \par An Upper EMG/NCV was ordered to delineate radiculopathy vs neuropathies vs nerve damage.\par \par Physical therapy of the cervical spine 2-3 times a week for 4-8 weeks stressing a home exercise program of walking, shoulder griddle strengthening,  swimming, elliptical , recumbent bike, Adalid chi and Yoga. Use things that heat like hot shower or icy heat before rehab, exercising and at the beginning of the day, and ice (ice in a bag never directly on the skin) after activity and at the end of the day.\par \par Entered by Janell Browning, acting as scribe for Dr. Montoya.\par  \par The documentation recorded by the scribe, in my presence, accurately reflects the service I personally performed, and the decisions made by me with my edits as appropriate.\par  \par Best Regards, \par Kevin Montoya MD \par Board Certified, Anesthesiology \par Board Certified, Pain Medicine\par \par

## 2022-12-12 NOTE — HISTORY OF PRESENT ILLNESS
[FreeTextEntry1] : Mr. Aguayo is a 22 year old male presenting to our walk in clinic to establish care for his neck pain. He states he has had this pain for 5 months now. Over the last 2-3 months the pain has been severe. He denies any inciting event to have caused this pain.\par \par He states the pain is in the neck. He notes associated stiffness and spasms along with pain with rotational movements. He states there is a lot of swelling in the neck. He states the pain occasionally radiates into the shoulders and into the upper extremities at times. He currently rates his pain at a 7/10 on the pain scale. He does not some headaches which radiate from the neck. He denies any red flags. \par \par He does give a history of lower back pain.

## 2022-12-12 NOTE — PHYSICAL EXAM
[de-identified] : NECK - tenderness into the cervical paraspinals and upward into the head. ROM restricted. Pain with flexion and extension. Positive spurling bilaterally.

## 2022-12-28 ENCOUNTER — OUTPATIENT (OUTPATIENT)
Dept: OUTPATIENT SERVICES | Facility: HOSPITAL | Age: 22
LOS: 1 days | Discharge: HOME | End: 2022-12-28
Payer: COMMERCIAL

## 2022-12-28 DIAGNOSIS — Z98.890 OTHER SPECIFIED POSTPROCEDURAL STATES: Chronic | ICD-10-CM

## 2022-12-28 DIAGNOSIS — Z90.89 ACQUIRED ABSENCE OF OTHER ORGANS: Chronic | ICD-10-CM

## 2022-12-28 DIAGNOSIS — I42.7 CARDIOMYOPATHY DUE TO DRUG AND EXTERNAL AGENT: ICD-10-CM

## 2022-12-28 DIAGNOSIS — Z98.1 ARTHRODESIS STATUS: Chronic | ICD-10-CM

## 2022-12-28 PROCEDURE — 75561 CARDIAC MRI FOR MORPH W/DYE: CPT | Mod: 26

## 2023-01-01 ENCOUNTER — TRANSCRIPTION ENCOUNTER (OUTPATIENT)
Age: 23
End: 2023-01-01

## 2023-01-01 ENCOUNTER — EMERGENCY (EMERGENCY)
Facility: HOSPITAL | Age: 23
LOS: 0 days | Discharge: HOME | End: 2023-01-01
Attending: EMERGENCY MEDICINE | Admitting: EMERGENCY MEDICINE
Payer: COMMERCIAL

## 2023-01-01 VITALS
SYSTOLIC BLOOD PRESSURE: 172 MMHG | WEIGHT: 315 LBS | HEART RATE: 75 BPM | DIASTOLIC BLOOD PRESSURE: 108 MMHG | HEIGHT: 78 IN | OXYGEN SATURATION: 98 % | RESPIRATION RATE: 20 BRPM | TEMPERATURE: 97 F
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
## 2023-01-01 DIAGNOSIS — Z88.5 ALLERGY STATUS TO NARCOTIC AGENT: ICD-10-CM

## 2023-01-01 DIAGNOSIS — Z98.1 ARTHRODESIS STATUS: ICD-10-CM

## 2023-01-01 DIAGNOSIS — G90.A POSTURAL ORTHOSTATIC TACHYCARDIA SYNDROME [POTS]: ICD-10-CM
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
## 2023-01-01 DIAGNOSIS — Z90.09 ACQUIRED ABSENCE OF OTHER PART OF HEAD AND NECK: ICD-10-CM

## 2023-01-01 DIAGNOSIS — Z98.890 OTHER SPECIFIED POSTPROCEDURAL STATES: Chronic | ICD-10-CM

## 2023-01-01 DIAGNOSIS — Z98.890 OTHER SPECIFIED POSTPROCEDURAL STATES: ICD-10-CM

## 2023-01-01 DIAGNOSIS — M54.2 CERVICALGIA: ICD-10-CM

## 2023-01-01 DIAGNOSIS — S00.411A ABRASION OF RIGHT EAR, INITIAL ENCOUNTER: ICD-10-CM

## 2023-01-01 DIAGNOSIS — Z98.1 ARTHRODESIS STATUS: Chronic | ICD-10-CM

## 2023-01-01 DIAGNOSIS — Z90.89 ACQUIRED ABSENCE OF OTHER ORGANS: Chronic | ICD-10-CM

## 2023-01-01 DIAGNOSIS — F41.9 ANXIETY DISORDER, UNSPECIFIED: ICD-10-CM

## 2023-01-01 PROCEDURE — 71046 X-RAY EXAM CHEST 2 VIEWS: CPT | Mod: 26

## 2023-01-01 PROCEDURE — 99284 EMERGENCY DEPT VISIT MOD MDM: CPT

## 2023-01-01 RX ORDER — MUPIROCIN 20 MG/G
1 OINTMENT TOPICAL
Qty: 5 | Refills: 0
Start: 2023-01-01 | End: 2023-01-07

## 2023-01-01 NOTE — ED PROVIDER NOTE - CLINICAL SUMMARY MEDICAL DECISION MAKING FREE TEXT BOX
Patient with multiple complaints, primarily has some discomfort over the right retroauricular area where his glasses and mask strap rest, read about mastoiditis prompting visit, also feels prominence to his right clavicle after being  diagnosed with POTS, physical exam as above, chest x-ray appreciated, have no clinical concern for mastoiditis, will discharge with mupirocin and PMD follow-up.  Patient counseled regarding conditions which should prompt return.

## 2023-01-01 NOTE — ED PROVIDER NOTE - OBJECTIVE STATEMENT
23 y/o male presents to the ED for evaluation of pain to posterior right ear over past few days. patient denies any hearing loss, fevers. patient c/o pain to right side of neck worse with movement. patient with recent MRI of head, neck and heart within the past month. patient denies any difficulty swallowing or anterior neck swelling. no weight loss, night sweats.

## 2023-01-01 NOTE — ED ADULT NURSE NOTE - NSIMPLEMENTINTERV_GEN_ALL_ED
Implemented All Universal Safety Interventions:  [place] to call system. Call bell, personal items and telephone within reach. Instruct patient to call for assistance. Room bathroom lighting operational. Non-slip footwear when patient is off stretcher. Physically safe environment: no spills, clutter or unnecessary equipment. Stretcher in lowest position, wheels locked, appropriate side rails in place.

## 2023-01-01 NOTE — ED PROVIDER NOTE - PATIENT PORTAL LINK FT
You can access the FollowMyHealth Patient Portal offered by [facility] by registering at the following website: [email or web address]. By joining [employer]’s FollowMyHealth portal, you will also be able to view your health information using other applications (apps) compatible with our system.

## 2023-01-01 NOTE — ED PROVIDER NOTE - PHYSICAL EXAMINATION
Vital Signs: I have reviewed the initial vital signs.  Constitutional: well-nourished, no acute distress, normocephalic  Eyes: PERRLA, EOMI,  ENT: MMM, TM b/l clear , no nasal congestion, posterior right ear small abrasion with swelling  Cardiovascular: regular rate, regular rhythm, no murmur appreciated  Respiratory: unlabored respiratory effort, clear to auscultation bilaterally  Musculoskeletal: supple neck, no lower extremity edema, no bony tenderness  Integumentary: warm, dry, no rash  Neurologic: awake, alert, cranial nerves II-XII grossly intact, extremities’ motor and sensory functions grossly intact, no focal deficits

## 2023-01-13 ENCOUNTER — APPOINTMENT (OUTPATIENT)
Dept: NEUROLOGY | Facility: CLINIC | Age: 23
End: 2023-01-13

## 2023-06-05 ENCOUNTER — APPOINTMENT (OUTPATIENT)
Dept: NEUROSURGERY | Facility: CLINIC | Age: 23
End: 2023-06-05

## 2023-06-08 ENCOUNTER — APPOINTMENT (OUTPATIENT)
Dept: PAIN MANAGEMENT | Facility: CLINIC | Age: 23
End: 2023-06-08
Payer: COMMERCIAL

## 2023-06-08 VITALS
HEIGHT: 78 IN | BODY MASS INDEX: 36.45 KG/M2 | SYSTOLIC BLOOD PRESSURE: 138 MMHG | WEIGHT: 315 LBS | DIASTOLIC BLOOD PRESSURE: 85 MMHG | HEART RATE: 73 BPM

## 2023-06-08 DIAGNOSIS — M54.12 RADICULOPATHY, CERVICAL REGION: ICD-10-CM

## 2023-06-08 PROCEDURE — 99214 OFFICE O/P EST MOD 30 MIN: CPT

## 2023-06-08 NOTE — ASSESSMENT
[FreeTextEntry1] : Mr. Manohar Aguayo is a 22 year old male with a chief complaint of neck and lower back pain. He notes his pain refers into the left leg into the calf. He notes pain in his neck refers into the left clavicle and trapezius muscle. He notes occasional numbness and tingling in the hands. He has had this pain for about one year. He notes pain has worsened in the last 6 months. It should be noted that the patient had a lumbar surgery about 4 years ago with Dr. Reyes. Following the surgery he was diagnosed with meningitis. It should be noted that the patient had a recent diagnosis of endocarditis and POTS in September of 2022 for which he is  under the care of a cardiologist. His imaging was reviewed in detail during today's encounter. \par \par At this time we will order an EMG of the upper and lower extremities. An upper and lower extremity EMG/NCV was ordered to delineate radiculopathy vs neuropathies vs nerve damage.\par \par In regard to his neck pain, patient has documented myofascial spasms and trigger points in the muscle. Patient has trialed rehab (Home exercise, physical therapy or chiropractic care) and medications. Schedule a series of 1-3 left trapezius trigger points over 3-6 weeks. If 50% or greater relief for 6-8 weeks another can be repeated vs Botox. If no relief we would trial a Left C3-6 MBB. \par \par In regard to his lower back pain, patient may be a candidate for a left L5-S1 SNRI. The patient had a MRI that shows a radicular component along with pain referred into the lower extremity. Patient has trialed rehab (Home exercise, physical therapy or chiropractic care) and medications I will schedule a left L5-S1 SNRI. He also may be a candidate for a bilateral L2-4 MBB. He would like to consult with his spinal surgeon before proceeding. \par \par I, Becky Carreno, attest that this documentation has been prepared under the direction and in the presence of Provider Cedric Rosario DO\par The documentation recorded by the scribe, in my presence, accurately reflects the service I personally performed, and the decisions made by me with my edits as appropriate.\par \par Best Regards, \par Cedric Rosario D.O. \par Diplomat, American Board of Anesthesiology \par Diplomat, American Board of Pain Medicine \par Diplomat, American Board of Pain Management \par \par

## 2023-06-08 NOTE — REASON FOR VISIT
[Initial Visit] : an initial pain management visit [FreeTextEntry2] : NECK, BACK AND RT SHOULDER PAIN

## 2023-06-08 NOTE — PHYSICAL EXAM
[de-identified] : GENERAL APPEARANCE OF PATIENT IS WELL DEVELOPED, WELL NOURISHED, BODY HABITUS NORMAL, WELL GROOMED, NO DEFORMITIES NOTED. \par Head - Atraumatic and Normocephalic \par Eyes, Nose, and Throat: External inspection of ears and nose are normal overall without scars, lesions, or masses noted. Assessment of hearing is normal\par Neck-Examination of neck shows no masses, overall appearance is normal, neck is symmetric, tracheal position is midline, no crepitus is noted. Examination of thyroid shows no enlargement, tenderness or masses\par Respiratory- Assessment of respiratory effort shows no intercostal retractions, no use of accessory muscles, unlabored breathing, and normal diaphragmatic movement.\par Cardiovascular- Examination of extremities show no edema or varicosities\par Musculoskeletal. Examination of gait is not antalgic and station is normal\par Inspection and palpation of digits and nails shows no clubbing, cyanosis, nodules, drainage, fluctuance, petechiae\par \par • Spine – end range flexion and extension with pain. Facet tenderness at L3-S1 bilaterally. Sciatic notch tenderness on right. Straight leg positive bilaterally. \par \par \par • Neck- trapezius spasm worse on the right. Sternocleidomastoid tenderness on right. Facet tenderness at C3-6 on right. \par \par \par • RUE- inspection and palpation shows no misalignment, asymmetry, crepitation, defects, tenderness, masses, effusions. ROM is normal without crepitation or contracture. No instability or subluxation or laxity is noted. No abnormal movements.\par \par \par • LUE- inspection and palpation shows no misalignment, asymmetry, crepitation, defects, tenderness, masses, effusions. ROM is normal without crepitation or contracture. No instability or subluxation or laxity is noted. No abnormal movements.\par \par \par • RLE- inspection and palpation shows no misalignment, asymmetry, crepitation, defects, tenderness, masses, effusions. ROM is normal without crepitation or contracture. No instability or subluxation or laxity is noted. No abnormal movements.\par \par \par • LLE- inspection and palpation shows no misalignment, asymmetry, crepitation, defects, tenderness, masses, effusions. ROM is normal without crepitation or contracture. No instability or subluxation or laxity is noted. No abnormal movements.\par \par \par • Skin- Inspection of skin and subcutaneous tissue shows no rashes, lesions or ulcers. Palpation of skin and subcutaneous tissue shows no rashes, no indurations, subcutaneous nodules or tightening.\par \par \par • Abdomen- Nontender\par \par \par • Neurologic- CN 2-12 are grossly intact. No sensory or motor deficits in the upper and lower extremities. Adequate strength in upper and lower extremities \par \par \par • Psychiatric- Patient’s judgment and insight are intact. Oriented to time, place and person. Recent and remote memory intact.\par

## 2023-06-08 NOTE — HISTORY OF PRESENT ILLNESS
[FreeTextEntry1] : HISTORY OF PRESENT ILLNESS: This is a 22 year old man complaining of lower back and neck pain. The patient has had this pain for 1 year. The pain has worsened in the last 6 months. The pain started after treatment. Patient describes pain as moderate to severe. During the last month the pain has been nearly constant with symptoms worse in the morning and at night. Pain described as pressure-like, dull/aching, and shooting. Pain is associated with numbness and pins and needles into the buttocks and left lower extremity. Patient does not experience weakness. Pain is increased with lying down, standing, walking, exercise, and coughing/sneezing.  Pain is decreased with sitting. Pain is not changed with exercise and bowel movements. Bowel or bladder habits have not changed.\par \par It should be noted that the patient had a lumbar surgery about 4 years ago with Dr. Reyes. Following the surgery he was diagnosed with meningitis. It should be noted that the patient had a recent diagnosis of endocarditis and POTS in September of 2022 for which he is  under the care of a cardiologist. His imaging was reviewed in detail during today's encounter. \par \par  \par ACTIVITIES: Patient could walk 5 blocks before the pain starts. Patient can sit 6 hours  before pain starts. Patient can stand 40 minutes before pain starts. The patient sometimes lays down because of pain. Patient uses no assistive walking device at this time. Patient has difficulty going to work, doing yard work or shopping, participating in recreational activities, and exercising at this time.\par \par PRIOR PAIN TREATMENTS:  No relief with surgery, physical therapy, exercise.  Moderate relief with heat treatment and cold treatment.\par \par PRIOR PAIN MEDICATIONS:  Tylenol, aspirin, and gabapentin\par

## 2023-06-10 NOTE — ED PROVIDER NOTE - PHYSICAL EXAMINATION
VITAL SIGNS: I have reviewed nursing notes and confirm.  CONSTITUTIONAL: Well-developed; well-nourished; in no acute distress.  SKIN: Skin exam is warm and dry, no acute rash.  HEAD: Normocephalic; atraumatic.  EYES: PERRL, EOM intact; conjunctiva and sclera clear.  ENT: No nasal discharge; airway clear. TMs clear.  NECK: Supple; non tender.  CARD: S1, S2 normal; no murmurs, gallops, or rubs. Regular rate and rhythm.  RESP: No wheezes, rales or rhonchi.  ABD: Normal bowel sounds; soft; non-distended; non-tender; no hepatosplenomegaly.  EXT: Normal ROM. No clubbing, cyanosis or edema.  NEURO: Awake, alert, oriented. CN II-XII intact, 5/5 strength at upper and lower extremities, no pronator drift, intact finger to nose, steady gait and tandem gait, clear speech  PSYCH: Cooperative, appropriate.
Patient/Caregiver provided printed discharge information.

## 2023-06-20 ENCOUNTER — APPOINTMENT (OUTPATIENT)
Dept: ORTHOPEDIC SURGERY | Facility: CLINIC | Age: 23
End: 2023-06-20

## 2023-06-20 ENCOUNTER — APPOINTMENT (OUTPATIENT)
Dept: NEUROLOGY | Facility: CLINIC | Age: 23
End: 2023-06-20
Payer: COMMERCIAL

## 2023-06-20 PROCEDURE — 95911 NRV CNDJ TEST 9-10 STUDIES: CPT

## 2023-06-20 PROCEDURE — 95886 MUSC TEST DONE W/N TEST COMP: CPT

## 2023-06-22 ENCOUNTER — APPOINTMENT (OUTPATIENT)
Dept: PAIN MANAGEMENT | Facility: CLINIC | Age: 23
End: 2023-06-22
Payer: COMMERCIAL

## 2023-06-22 DIAGNOSIS — M79.18 MYALGIA, OTHER SITE: ICD-10-CM

## 2023-06-22 PROCEDURE — 76942 ECHO GUIDE FOR BIOPSY: CPT

## 2023-06-22 PROCEDURE — 20552 NJX 1/MLT TRIGGER POINT 1/2: CPT

## 2023-06-26 NOTE — PROCEDURE
[FreeTextEntry3] : Trigger Point Injection under Ultrasound Guidance\par \par Preoperative diagnosis: Cervical and trapezius Trigger points/ Myalgia                      \par Postoperative diagnosis:    Same  \par Procedure: Cervical and trapezius Trigger point injections with ultrasound guidance.\par \par  Trigger point injections, one or two muscles:\par \par Ultrasound Guidance:                                    \par Physician: Cedric Rosario D.O.                                                                                             \par \par Indications for Ultrasound:  Accurate percutaneous needle placement requires image guidance to prevent neuro/vascular injury or intravascular injection, as well as to avoid lung injury.\par Medical Necessity: Failure of conservative management\par \par Indication a palpable taut band of muscle with restriction of motion.  Noninvasive treatment modalities such as rest, heat/ice and stretching have been ineffective.  The pain has been present for greater than 3 months.\par \par CONSENT: The possible complications including infection, bleeding, nerve damage, pneumothorax (collapsed lung), hospital admission or failure of the procedure; though unusual, are theoretically possible. The patient was educated about the of the procedure and alternative therapies. All questions were answered and the patient freely gave consent to proceed.\par \par PROCEDURE NOTE:  After obtaining written consent, the patient was placed in the prone or sitting position. Areas of point tenderness in the muscles were identified and prepped with Hibiclens in a sterile fashion. Using continuous ultrasound guidance for needle localization, multiple trigger points in the cervical and trapezius were IDed. A mixture of 100cc of D5, 2cc of 2% lidocaine, with a .5cc of Bicarb 8.4 PH was made. Using  50 cc of the mixture were injected into multiple trigger areas with a 27-gauge 1/2" needle using was used. All the needles were removed intact.  \par \par Ultrasound findings: No calcifications\par \par Complications: None. The patient tolerated the procedure well.\par \par Disposition: I have examined the patient and there are no new physical findings since the original presentation.  Sensory and motor function were intact. The patient met discharge criteria see nurses notes.\par \par Comment: 1st trigger point today, depending on effectiveness and insurance would repeat in 2 week or follow up in office in 1 week. Call if any problems.\par \par Cedric Rosario D.O.\par Diplomat, American Board of Anesthesiology\par Diplomat, American Board of Pain Medicine\par Diplomat, American Board of Pain Management\par \par \par \par

## 2023-06-30 ENCOUNTER — APPOINTMENT (OUTPATIENT)
Dept: NEUROLOGY | Facility: CLINIC | Age: 23
End: 2023-06-30
Payer: COMMERCIAL

## 2023-06-30 PROCEDURE — 95886 MUSC TEST DONE W/N TEST COMP: CPT

## 2023-06-30 PROCEDURE — 95912 NRV CNDJ TEST 11-12 STUDIES: CPT

## 2023-07-11 ENCOUNTER — APPOINTMENT (OUTPATIENT)
Dept: PAIN MANAGEMENT | Facility: CLINIC | Age: 23
End: 2023-07-11
Payer: COMMERCIAL

## 2023-07-11 PROCEDURE — 76942 ECHO GUIDE FOR BIOPSY: CPT

## 2023-07-11 PROCEDURE — 20552 NJX 1/MLT TRIGGER POINT 1/2: CPT

## 2023-07-11 NOTE — PROCEDURE
[FreeTextEntry3] : Trigger Point Injection under Ultrasound Guidance\par \par Preoperative diagnosis: Cervical and trapezius Trigger points/ Myalgia                      \par Postoperative diagnosis: Same  \par Procedure: Cervical and trapezius Trigger point injections with ultrasound guidance.\par \par  Trigger point injections, one or two muscles:\par \par Ultrasound Guidance:                                    \par Physician: Cedric Rosario D.O.                                                                                             \par \par Indications for Ultrasound:  Accurate percutaneous needle placement requires image guidance to prevent neuro/vascular injury or intravascular injection, as well as to avoid lung injury.\par Medical Necessity: Failure of conservative management\par \par Indication a palpable taut band of muscle with restriction of motion.  Noninvasive treatment modalities such as rest, heat/ice and stretching have been ineffective.  The pain has been present for greater than 3 months.\par \par CONSENT: The possible complications including infection, bleeding, nerve damage, pneumothorax (collapsed lung), hospital admission or failure of the procedure; though unusual, are theoretically possible. The patient was educated about the of the procedure and alternative therapies. All questions were answered and the patient freely gave consent to proceed.\par \par PROCEDURE NOTE:  After obtaining written consent, the patient was placed in the prone or sitting position. Areas of point tenderness in the muscles were identified and prepped with Hibiclens in a sterile fashion. Using continuous ultrasound guidance for needle localization, multiple trigger points in the cervical and trapezius were IDed. A mixture of 100cc of D5, 2cc of 2% lidocaine, with a .5cc of Bicarb 8.4 PH was made. Using  70 cc of the mixture were injected into multiple trigger areas with a 27-gauge 1/2" needle using was used. All the needles were removed intact.  \par \par Ultrasound findings: No calcifications\par \par Complications: None. The patient tolerated the procedure well.\par \par Disposition: I have examined the patient and there are no new physical findings since the original presentation.  Sensory and motor function were intact. The patient met discharge criteria see nurses notes.\par \par Comment: Patient notes about 10% relief from the 1st trigger point. 2nd TPI today, depending on effectiveness and insurance would repeat in 2 week or follow up in office in 1 week. Call if any problems.\par \par Cedric Rosario D.O.\par Diplomat, American Board of Anesthesiology\par Diplomat, American Board of Pain Medicine\par Diplomat, American Board of Pain Management\par \par \par \par

## 2023-07-13 ENCOUNTER — APPOINTMENT (OUTPATIENT)
Dept: PAIN MANAGEMENT | Facility: CLINIC | Age: 23
End: 2023-07-13
Payer: COMMERCIAL

## 2023-07-13 ENCOUNTER — APPOINTMENT (OUTPATIENT)
Dept: PAIN MANAGEMENT | Facility: CLINIC | Age: 23
End: 2023-07-13

## 2023-07-13 PROCEDURE — 93770 DETERMINATION VENOUS PRESS: CPT

## 2023-07-13 PROCEDURE — 93040 RHYTHM ECG WITH REPORT: CPT | Mod: 79

## 2023-07-13 PROCEDURE — 94761 N-INVAS EAR/PLS OXIMETRY MLT: CPT

## 2023-07-13 PROCEDURE — 64483 NJX AA&/STRD TFRM EPI L/S 1: CPT | Mod: LT

## 2023-07-17 NOTE — PROCEDURE
[FreeTextEntry1] : SELECTIVE TRANSFORAMINAL LUMBAR EPIDURAL NERVE ROOT INJECTION UNDER FLUOROSCOPY [FreeTextEntry3] : SELECTIVE TRANSFORAMINAL LUMBAR EPIDURAL NERVE ROOT INJECTION UNDER FLUOROSCOPY\par  \par Preoperative Diagnosis: Lumbar radiculopathy\par Postoperative Diagnosis:Same\par Procedure: Selective /Left L5-[id number] Lumbar Epidural Nerve Root Injection under Fluoroscopy\par Physician: Cedric Rosario D.O. \par Anesthesia: See nurses notes, Local \par \par Medical Necessity: Failure of conservative management.\par \par Indication for Fluoroscopy: This procedure requires the precise placement of the spinal needle into the specific paravertebral foramen. It is the only way to accurately and safely perform the injection.\par \par CONSENT: The possible complications including infection, bleeding, nerve damage, Hospital admission, death or failure of the procedure; though unusual, are theoretically possible. The patient was educated about the of the procedure and alternative therapies. All questions were answered and the patient freely gave consent to proceed.\par Monitoring: Patient had continuous blood pressure, EKG, and pulse oximetry throughout the case. See nurse's notes\par \par PROCEDURE NOTE:\par After obtaining written consent, the patient was placed on the fluoroscopic table in the prone position with the pillow placed under the hips. The lumbar area was prepped with Betadine solution and draped in the usual manner. A time out was performed. Cold spray was used to localize the area. The fluoroscope was used to identify the L2//L3///L4///L5 vertebral body on the AP projection. It was then rotated into an oblique projection until the superior articulating process of the S1 (inferior) vertebra is projected beneath the 6 o'clock position of the L5 (superior) vertebrae. The 22 gauge 3 1/2 or 5 inch needle was inserted in the skin at a point overlying the superior articulating process of the inferior vertebra and aimed for the 6 o'clock position of the superior vertebrae's pedicle. After the needle contacted bone, a lateral projection was obtained to insure that the needle tip was in proximity with the vertebral body. Paresthesias were not noted. One ml of Omnipaque 240 was injected and a neurogram was obtained. Following demonstration of the neurogram, 1 ml of Preservative free normal saline and 1 ml of Methylprednisolone (80 mg) was injected. The small volume and relatively high concentration was chosen to preserve selectivity and diagnostic value of the injection. There was no CSF or heme identified. There were no signs of, intravascular block or hypotension. The needle was removed intact. A band aid was place on the site.\par \par Epidurogram: The nerve root was observed in its outline on the neurogram. Distal and proximal spread was noted pointing away from epidural fibrosis/scarring.\par \par \par \par Complications: None. The patient tolerated the procedure well. \par \par Disposition: I have examined the patient and there are no new physical findings since the original presentation. Sensory and motor function were intact. The patient met discharge criteria see nurses notes. The discharge instruction sheet was reviewed and given to the patient. The patient was discharged home with a . If patient gets sustained relief will have patient do modified planks 3 times a day on carpet or yoga mat starting at 5 seconds building up to 1 minute without grimacing/Valsalva and walking. \par \par Comment: 1st SNRI today, depending on effectiveness would schedule 2nd SNRI in 1-2 weeks vs caudal epidural steroid vs follow up in office depending on insurances. Follow up office. Call if any problems\par \par This document was electronically signed by: \par Cedric Rosario D.O.\par Diplomat, American Board of Anesthesiology\par Diplomat, American Board of Pain Medicine\par Diplomat, American Board of Pain Management\par \par

## 2023-07-27 ENCOUNTER — APPOINTMENT (OUTPATIENT)
Dept: PAIN MANAGEMENT | Facility: CLINIC | Age: 23
End: 2023-07-27

## 2023-09-05 ENCOUNTER — APPOINTMENT (OUTPATIENT)
Dept: PAIN MANAGEMENT | Facility: CLINIC | Age: 23
End: 2023-09-05

## 2023-09-19 ENCOUNTER — APPOINTMENT (OUTPATIENT)
Dept: PAIN MANAGEMENT | Facility: CLINIC | Age: 23
End: 2023-09-19

## 2023-09-26 ENCOUNTER — APPOINTMENT (OUTPATIENT)
Dept: PAIN MANAGEMENT | Facility: CLINIC | Age: 23
End: 2023-09-26
Payer: COMMERCIAL

## 2023-09-26 VITALS — WEIGHT: 315 LBS | HEIGHT: 78 IN | BODY MASS INDEX: 36.45 KG/M2

## 2023-09-26 DIAGNOSIS — M47.816 SPONDYLOSIS W/OUT MYELOPATHY OR RADICULOPATHY, LUMBAR REGION: ICD-10-CM

## 2023-09-26 DIAGNOSIS — M54.16 RADICULOPATHY, LUMBAR REGION: ICD-10-CM

## 2023-09-26 DIAGNOSIS — M99.63 OSSEOUS AND SUBLUXATION STENOSIS OF INTERVERTEBRAL FORAMINA OF LUMBAR REGION: ICD-10-CM

## 2023-09-26 DIAGNOSIS — M99.73 CONNECTIVE TISSUE AND DISC STENOSIS OF INTERVERTEBRAL FORAMINA OF LUMBAR REGION: ICD-10-CM

## 2023-09-26 DIAGNOSIS — M79.18 MYALGIA, OTHER SITE: ICD-10-CM

## 2023-09-26 DIAGNOSIS — M47.812 SPONDYLOSIS W/OUT MYELOPATHY OR RADICULOPATHY, CERVICAL REGION: ICD-10-CM

## 2023-09-26 DIAGNOSIS — M47.817 SPONDYLOSIS W/OUT MYELOPATHY OR RADICULOPATHY, LUMBOSACRAL REGION: ICD-10-CM

## 2023-09-26 PROCEDURE — 99214 OFFICE O/P EST MOD 30 MIN: CPT

## 2023-10-10 ENCOUNTER — APPOINTMENT (OUTPATIENT)
Dept: PAIN MANAGEMENT | Facility: CLINIC | Age: 23
End: 2023-10-10

## 2023-10-19 ENCOUNTER — APPOINTMENT (OUTPATIENT)
Dept: PAIN MANAGEMENT | Facility: CLINIC | Age: 23
End: 2023-10-19

## 2023-11-07 ENCOUNTER — APPOINTMENT (OUTPATIENT)
Dept: PAIN MANAGEMENT | Facility: CLINIC | Age: 23
End: 2023-11-07

## 2023-12-13 ENCOUNTER — NON-APPOINTMENT (OUTPATIENT)
Age: 23
End: 2023-12-13

## 2023-12-27 ENCOUNTER — NON-APPOINTMENT (OUTPATIENT)
Age: 23
End: 2023-12-27

## 2023-12-27 DIAGNOSIS — R42 DIZZINESS AND GIDDINESS: ICD-10-CM

## 2023-12-27 DIAGNOSIS — G90.A POSTURAL ORTHOSTATIC TACHYCARDIA SYNDROME [POTS]: ICD-10-CM

## 2023-12-27 DIAGNOSIS — F17.200 NICOTINE DEPENDENCE, UNSPECIFIED, UNCOMPLICATED: ICD-10-CM

## 2023-12-27 DIAGNOSIS — Z28.21 IMMUNIZATION NOT CARRIED OUT BECAUSE OF PATIENT REFUSAL: ICD-10-CM

## 2023-12-27 DIAGNOSIS — E66.3 OVERWEIGHT: ICD-10-CM

## 2023-12-27 DIAGNOSIS — F12.91 CANNABIS USE, UNSPECIFIED, IN REMISSION: ICD-10-CM

## 2023-12-27 DIAGNOSIS — G62.9 POLYNEUROPATHY, UNSPECIFIED: ICD-10-CM

## 2023-12-27 DIAGNOSIS — G24.3 SPASMODIC TORTICOLLIS: ICD-10-CM

## 2023-12-27 RX ORDER — TIZANIDINE 2 MG/1
2 TABLET ORAL
Refills: 0 | Status: ACTIVE | COMMUNITY

## 2023-12-27 NOTE — ED ADULT NURSE NOTE - WAS YOUR LAST COVID-19 VACCINE GREATER THAN OR EQUAL TO TWO MONTHS AGO?
Patient found in someone's home, noted to have drugs in home and his car. While in police car became altered and unresponsive. Was sternal rubbed and given Narcan.  UDS polypharmacy drug abuse w/ Amph/Meth, Cocaine, THS, Oxycodone  Supportive care and monitoring   Yes

## 2024-04-26 ENCOUNTER — EMERGENCY (EMERGENCY)
Facility: HOSPITAL | Age: 24
LOS: 0 days | Discharge: HOME | End: 2024-04-26
Attending: EMERGENCY MEDICINE
Payer: COMMERCIAL

## 2024-04-26 VITALS
WEIGHT: 315 LBS | DIASTOLIC BLOOD PRESSURE: 90 MMHG | OXYGEN SATURATION: 99 % | HEART RATE: 113 BPM | TEMPERATURE: 98 F | RESPIRATION RATE: 20 BRPM | SYSTOLIC BLOOD PRESSURE: 136 MMHG

## 2024-04-26 DIAGNOSIS — S51.852A OPEN BITE OF LEFT FOREARM, INITIAL ENCOUNTER: ICD-10-CM

## 2024-04-26 DIAGNOSIS — Z98.890 OTHER SPECIFIED POSTPROCEDURAL STATES: Chronic | ICD-10-CM
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
## 2024-04-26 DIAGNOSIS — Z90.89 ACQUIRED ABSENCE OF OTHER ORGANS: Chronic | ICD-10-CM
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
## 2024-04-26 DIAGNOSIS — Z98.1 ARTHRODESIS STATUS: Chronic | ICD-10-CM

## 2024-04-26 DIAGNOSIS — Z23 ENCOUNTER FOR IMMUNIZATION: ICD-10-CM

## 2024-04-26 PROCEDURE — 73090 X-RAY EXAM OF FOREARM: CPT | Mod: 26,LT

## 2024-04-26 PROCEDURE — 99283 EMERGENCY DEPT VISIT LOW MDM: CPT | Mod: 25

## 2024-04-26 PROCEDURE — 73090 X-RAY EXAM OF FOREARM: CPT | Mod: LT

## 2024-04-26 PROCEDURE — 90715 TDAP VACCINE 7 YRS/> IM: CPT

## 2024-04-26 PROCEDURE — 90471 IMMUNIZATION ADMIN: CPT

## 2024-04-26 PROCEDURE — 99284 EMERGENCY DEPT VISIT MOD MDM: CPT

## 2024-04-26 RX ORDER — IBUPROFEN 200 MG
600 TABLET ORAL ONCE
Refills: 0 | Status: COMPLETED | OUTPATIENT
Start: 2024-04-26 | End: 2024-04-26

## 2024-04-26 RX ORDER — TETANUS TOXOID, REDUCED DIPHTHERIA TOXOID AND ACELLULAR PERTUSSIS VACCINE, ADSORBED 5; 2.5; 8; 8; 2.5 [IU]/.5ML; [IU]/.5ML; UG/.5ML; UG/.5ML; UG/.5ML
0.5 SUSPENSION INTRAMUSCULAR ONCE
Refills: 0 | Status: COMPLETED | OUTPATIENT
Start: 2024-04-26 | End: 2024-04-26

## 2024-04-26 RX ADMIN — TETANUS TOXOID, REDUCED DIPHTHERIA TOXOID AND ACELLULAR PERTUSSIS VACCINE, ADSORBED 0.5 MILLILITER(S): 5; 2.5; 8; 8; 2.5 SUSPENSION INTRAMUSCULAR at 19:24

## 2024-04-26 RX ADMIN — Medication 600 MILLIGRAM(S): at 19:24

## 2024-04-26 RX ADMIN — Medication 1 TABLET(S): at 20:20

## 2024-04-26 NOTE — ED PROVIDER NOTE - OBJECTIVE STATEMENT
23-year-old male no pertinent past medical history presents for evaluation of dog bite.  Patient was bitten by his vaccinated dog on his left forearm sustaining 2 puncture wounds.  Now presents with mild aching pain localized to the forearm, no aggravating relieving factors.  Denies numbness, weakness.

## 2024-04-26 NOTE — ED PROVIDER NOTE - CLINICAL SUMMARY MEDICAL DECISION MAKING FREE TEXT BOX
24 Y/O WM with punctured wound left forearm from dog bite.  XR negative for fracture.  Given Augmentin and wound care instructions.  He received tdap in vet's office.  Instructed to return for worsening pain, swelling or fever.

## 2024-04-26 NOTE — ED PROVIDER NOTE - PHYSICAL EXAMINATION
CONST: Well appearing in NAD  MS: Normal ROM in all extremities. pulses 2 +. no swelling  SKIN: Bite to L forearm with two puncture wounds  NEURO: Strength 5/5 with no sensory deficits.

## 2024-04-26 NOTE — ED PROVIDER NOTE - ATTENDING APP SHARED VISIT CONTRIBUTION OF CARE
24 y/o WM, bit by pet dog on left forearm.  Dog is up to date with immunizations. Exam shows punctured wounds left forearm with mild swelling, FROM, good distal pulses.

## 2024-04-26 NOTE — ED ADULT NURSE NOTE - DOES PATIENT HAVE ADVANCE DIRECTIVE
Per Dr. Garcia, patient to receive toradol 30 mg injection in clinic. Writer confirmed this with patient prior to administration and verified if patient had this injection in the past and if she had any issues or concerns regarding it. Patient stated that she has had this injection in the past and denied any issues or concerns with it. Medication was administered. Patient tolerated well and had no questions or concerns.    No

## 2024-04-26 NOTE — ED PROVIDER NOTE - NSFOLLOWUPINSTRUCTIONS_ED_ALL_ED_FT
Follow up with PMD in 1-2 days.    Animal Bite    Animal bites can range from mild to serious. An animal bite can result in a scratch on the skin, a deep open cut, a puncture of the skin, a crush injury, or tearing away of the skin or a body part. Treatment includes wound care, updating your tetanus shot, and in some cases, administering a rabies vaccine. If you were prescribed an antibiotic, take it as told by your health care provider. Do not stop using the antibiotic even if your condition improves.      SEEK IMMEDIATE MEDICAL CARE IF YOU HAVE ANY OF THE FOLLOWING SYMPTOMS: red streaking away from the wound, fluid/blood/pus coming from the wound, fever or chills, trouble moving the injured area, numbness or tingling extending beyond the wound.

## 2024-04-26 NOTE — ED PROVIDER NOTE - PATIENT PORTAL LINK FT
You can access the FollowMyHealth Patient Portal offered by [facility] by registering at the following website: [email or web address]. By joining [employer]’s FollowMyHealth portal, you will also be able to view your health information using other applications (apps) compatible with our system.

## 2024-08-02 ENCOUNTER — NON-APPOINTMENT (OUTPATIENT)
Age: 24
End: 2024-08-02

## 2024-08-03 ENCOUNTER — EMERGENCY (EMERGENCY)
Facility: HOSPITAL | Age: 24
LOS: 0 days | Discharge: HOME | End: 2024-08-03
Attending: EMERGENCY MEDICINE
Payer: COMMERCIAL

## 2024-08-03 VITALS
RESPIRATION RATE: 18 BRPM | SYSTOLIC BLOOD PRESSURE: 130 MMHG | OXYGEN SATURATION: 98 % | HEART RATE: 88 BPM | TEMPERATURE: 98 F | DIASTOLIC BLOOD PRESSURE: 85 MMHG

## 2024-08-03 VITALS
RESPIRATION RATE: 18 BRPM | HEART RATE: 100 BPM | SYSTOLIC BLOOD PRESSURE: 129 MMHG | WEIGHT: 315 LBS | DIASTOLIC BLOOD PRESSURE: 91 MMHG | HEIGHT: 78 IN | OXYGEN SATURATION: 100 % | TEMPERATURE: 99 F

## 2024-08-03 DIAGNOSIS — R19.5 OTHER FECAL ABNORMALITIES: ICD-10-CM

## 2024-08-03 DIAGNOSIS — Z98.1 ARTHRODESIS STATUS: Chronic | ICD-10-CM

## 2024-08-03 DIAGNOSIS — Z98.890 OTHER SPECIFIED POSTPROCEDURAL STATES: Chronic | ICD-10-CM

## 2024-08-03 DIAGNOSIS — K52.9 NONINFECTIVE GASTROENTERITIS AND COLITIS, UNSPECIFIED: ICD-10-CM

## 2024-08-03 DIAGNOSIS — Z90.89 ACQUIRED ABSENCE OF OTHER ORGANS: Chronic | ICD-10-CM

## 2024-08-03 DIAGNOSIS — R10.31 RIGHT LOWER QUADRANT PAIN: ICD-10-CM

## 2024-08-03 DIAGNOSIS — R11.2 NAUSEA WITH VOMITING, UNSPECIFIED: ICD-10-CM

## 2024-08-03 DIAGNOSIS — Z88.5 ALLERGY STATUS TO NARCOTIC AGENT: ICD-10-CM

## 2024-08-03 LAB
ALBUMIN SERPL ELPH-MCNC: 4.1 G/DL — SIGNIFICANT CHANGE UP (ref 3.5–5.2)
ALP SERPL-CCNC: 65 U/L — SIGNIFICANT CHANGE UP (ref 30–115)
ALT FLD-CCNC: 44 U/L — HIGH (ref 0–41)
ANION GAP SERPL CALC-SCNC: 13 MMOL/L — SIGNIFICANT CHANGE UP (ref 7–14)
APPEARANCE UR: CLEAR — SIGNIFICANT CHANGE UP
AST SERPL-CCNC: 24 U/L — SIGNIFICANT CHANGE UP (ref 0–41)
BASOPHILS # BLD AUTO: 0.04 K/UL — SIGNIFICANT CHANGE UP (ref 0–0.2)
BASOPHILS NFR BLD AUTO: 0.3 % — SIGNIFICANT CHANGE UP (ref 0–1)
BILIRUB SERPL-MCNC: 0.5 MG/DL — SIGNIFICANT CHANGE UP (ref 0.2–1.2)
BILIRUB UR-MCNC: NEGATIVE — SIGNIFICANT CHANGE UP
BUN SERPL-MCNC: 16 MG/DL — SIGNIFICANT CHANGE UP (ref 10–20)
CALCIUM SERPL-MCNC: 9.2 MG/DL — SIGNIFICANT CHANGE UP (ref 8.4–10.5)
CHLORIDE SERPL-SCNC: 105 MMOL/L — SIGNIFICANT CHANGE UP (ref 98–110)
CO2 SERPL-SCNC: 22 MMOL/L — SIGNIFICANT CHANGE UP (ref 17–32)
COLOR SPEC: YELLOW — SIGNIFICANT CHANGE UP
CREAT SERPL-MCNC: 1 MG/DL — SIGNIFICANT CHANGE UP (ref 0.7–1.5)
DIFF PNL FLD: NEGATIVE — SIGNIFICANT CHANGE UP
EGFR: 108 ML/MIN/1.73M2 — SIGNIFICANT CHANGE UP
EGFR: 108 ML/MIN/1.73M2 — SIGNIFICANT CHANGE UP
EOSINOPHIL # BLD AUTO: 0.16 K/UL — SIGNIFICANT CHANGE UP (ref 0–0.7)
EOSINOPHIL NFR BLD AUTO: 1.4 % — SIGNIFICANT CHANGE UP (ref 0–8)
GLUCOSE SERPL-MCNC: 95 MG/DL — SIGNIFICANT CHANGE UP (ref 70–99)
GLUCOSE UR QL: NEGATIVE MG/DL — SIGNIFICANT CHANGE UP
HCT VFR BLD CALC: 46.1 % — SIGNIFICANT CHANGE UP (ref 42–52)
HGB BLD-MCNC: 15.8 G/DL — SIGNIFICANT CHANGE UP (ref 14–18)
IMM GRANULOCYTES NFR BLD AUTO: 0.3 % — SIGNIFICANT CHANGE UP (ref 0.1–0.3)
KETONES UR-MCNC: NEGATIVE MG/DL — SIGNIFICANT CHANGE UP
LEUKOCYTE ESTERASE UR-ACNC: NEGATIVE — SIGNIFICANT CHANGE UP
LIDOCAIN IGE QN: 17 U/L — SIGNIFICANT CHANGE UP (ref 7–60)
LYMPHOCYTES # BLD AUTO: 2.95 K/UL — SIGNIFICANT CHANGE UP (ref 1.2–3.4)
LYMPHOCYTES # BLD AUTO: 25.6 % — SIGNIFICANT CHANGE UP (ref 20.5–51.1)
MCHC RBC-ENTMCNC: 29.5 PG — SIGNIFICANT CHANGE UP (ref 27–31)
MCHC RBC-ENTMCNC: 34.3 G/DL — SIGNIFICANT CHANGE UP (ref 32–37)
MCV RBC AUTO: 86 FL — SIGNIFICANT CHANGE UP (ref 80–94)
MONOCYTES # BLD AUTO: 1.07 K/UL — HIGH (ref 0.1–0.6)
MONOCYTES NFR BLD AUTO: 9.3 % — SIGNIFICANT CHANGE UP (ref 1.7–9.3)
NEUTROPHILS # BLD AUTO: 7.26 K/UL — HIGH (ref 1.4–6.5)
NEUTROPHILS NFR BLD AUTO: 63.1 % — SIGNIFICANT CHANGE UP (ref 42.2–75.2)
NITRITE UR-MCNC: NEGATIVE — SIGNIFICANT CHANGE UP
NRBC # BLD: 0 /100 WBCS — SIGNIFICANT CHANGE UP (ref 0–0)
NRBC BLD-RTO: 0 /100 WBCS — SIGNIFICANT CHANGE UP (ref 0–0)
PH UR: 5.5 — SIGNIFICANT CHANGE UP (ref 5–8)
PLATELET # BLD AUTO: 244 K/UL — SIGNIFICANT CHANGE UP (ref 130–400)
PMV BLD: 9.8 FL — SIGNIFICANT CHANGE UP (ref 7.4–10.4)
POTASSIUM SERPL-MCNC: 4.6 MMOL/L — SIGNIFICANT CHANGE UP (ref 3.5–5)
POTASSIUM SERPL-SCNC: 4.6 MMOL/L — SIGNIFICANT CHANGE UP (ref 3.5–5)
PROT SERPL-MCNC: 6.8 G/DL — SIGNIFICANT CHANGE UP (ref 6–8)
PROT UR-MCNC: NEGATIVE MG/DL — SIGNIFICANT CHANGE UP
RBC # BLD: 5.36 M/UL — SIGNIFICANT CHANGE UP (ref 4.7–6.1)
RBC # FLD: 13 % — SIGNIFICANT CHANGE UP (ref 11.5–14.5)
SODIUM SERPL-SCNC: 140 MMOL/L — SIGNIFICANT CHANGE UP (ref 135–146)
SP GR SPEC: 1.02 — SIGNIFICANT CHANGE UP (ref 1–1.03)
UROBILINOGEN FLD QL: 0.2 MG/DL — SIGNIFICANT CHANGE UP (ref 0.2–1)
WBC # BLD: 11.52 K/UL — HIGH (ref 4.8–10.8)
WBC # FLD AUTO: 11.52 K/UL — HIGH (ref 4.8–10.8)

## 2024-08-03 PROCEDURE — 80053 COMPREHEN METABOLIC PANEL: CPT

## 2024-08-03 PROCEDURE — 74177 CT ABD & PELVIS W/CONTRAST: CPT | Mod: 26,MC

## 2024-08-03 PROCEDURE — 96361 HYDRATE IV INFUSION ADD-ON: CPT

## 2024-08-03 PROCEDURE — 74177 CT ABD & PELVIS W/CONTRAST: CPT | Mod: MC

## 2024-08-03 PROCEDURE — 83690 ASSAY OF LIPASE: CPT

## 2024-08-03 PROCEDURE — 36415 COLL VENOUS BLD VENIPUNCTURE: CPT

## 2024-08-03 PROCEDURE — 81003 URINALYSIS AUTO W/O SCOPE: CPT

## 2024-08-03 PROCEDURE — 85025 COMPLETE CBC W/AUTO DIFF WBC: CPT

## 2024-08-03 PROCEDURE — 99285 EMERGENCY DEPT VISIT HI MDM: CPT

## 2024-08-03 PROCEDURE — 96375 TX/PRO/DX INJ NEW DRUG ADDON: CPT

## 2024-08-03 PROCEDURE — 99284 EMERGENCY DEPT VISIT MOD MDM: CPT | Mod: 25

## 2024-08-03 PROCEDURE — 96374 THER/PROPH/DIAG INJ IV PUSH: CPT | Mod: XU

## 2024-08-03 RX ORDER — IOHEXOL 350 MG/ML
30 INJECTION, SOLUTION INTRAVENOUS ONCE
Refills: 0 | Status: COMPLETED | OUTPATIENT
Start: 2024-08-03 | End: 2024-08-03

## 2024-08-03 RX ORDER — METOPROLOL SUCCINATE 50 MG/1
0 TABLET, EXTENDED RELEASE ORAL
Refills: 0 | DISCHARGE

## 2024-08-03 RX ORDER — KETOROLAC TROMETHAMINE 30 MG/ML
30 INJECTION, SOLUTION INTRAMUSCULAR; INTRAVENOUS ONCE
Refills: 0 | Status: DISCONTINUED | OUTPATIENT
Start: 2024-08-03 | End: 2024-08-03

## 2024-08-03 RX ORDER — SACUBITRIL AND VALSARTAN 6; 6 MG/1; MG/1
1 PELLET ORAL
Refills: 0 | DISCHARGE

## 2024-08-03 RX ORDER — ONDANSETRON HCL/PF 4 MG/2 ML
4 VIAL (ML) INJECTION ONCE
Refills: 0 | Status: COMPLETED | OUTPATIENT
Start: 2024-08-03 | End: 2024-08-03

## 2024-08-03 RX ADMIN — KETOROLAC TROMETHAMINE 30 MILLIGRAM(S): 30 INJECTION, SOLUTION INTRAMUSCULAR; INTRAVENOUS at 18:07

## 2024-08-03 RX ADMIN — Medication 4 MILLIGRAM(S): at 18:06

## 2024-08-03 RX ADMIN — Medication 2000 MILLILITER(S): at 18:07

## 2024-08-03 RX ADMIN — IOHEXOL 30 MILLILITER(S): 350 INJECTION, SOLUTION INTRAVENOUS at 18:06

## 2024-08-03 RX ADMIN — Medication 1000 MILLILITER(S): at 19:00

## 2024-12-09 NOTE — ED ADULT NURSE NOTE - NS ED NURSE LEVEL OF CONSCIOUSNESS SPEECH
What Type Of Note Output Would You Prefer (Optional)?: Standard Output
Hpi Title: Evaluation of Skin Lesions
How Severe Are Your Spot(S)?: moderate
Have Your Spot(S) Been Treated In The Past?: has not been treated
Speaking Coherently

## 2024-12-23 NOTE — ED ADULT TRIAGE NOTE - PRO INTERPRETER NEED 2
Anesthesia Post-op Note    Patient: Maria Dolores Rizo  Procedure(s) Performed: PROCEDURE  Anesthesia type: General    Vitals Value Taken Time   Temp 36.5 °C (98.1 °F) 12/23/24 0839   Pulse 77 12/23/24 0839   Resp 20 12/23/24 0839   SpO2 100 % 12/23/24 0839   /48 12/23/24 0839         Patient Location: PACU Phase 1  Post-op Vital Signs:stable  Level of Consciousness: awake and alert  Respiratory Status: spontaneous ventilation  Cardiovascular stable  Hydration: euvolemic  Pain Management: adequately controlled  Handoff: Handoff to receiving clinician was performed and questions were answered  Vomiting: none  Nausea: None  Airway Patency:patent  Post-op Assessment: no complications, patient tolerated procedure well and No Corneal Abrasion      No notable events documented.                      
English

## 2025-03-17 NOTE — ED ADULT TRIAGE NOTE - STATUS:
Problem: At Risk for Falls  Goal: Patient does not fall  Outcome: Monitoring/Evaluating progress  Goal: Patient takes action to control fall-related risks  Outcome: Monitoring/Evaluating progress     Problem: At Risk for Injury Due to Fall  Goal: Patient does not fall  Outcome: Monitoring/Evaluating progress  Goal: Takes action to control condition specific risks  Outcome: Monitoring/Evaluating progress  Goal: Verbalizes understanding of fall-related injury personal risks  Description: Document education using the patient education activity  Outcome: Monitoring/Evaluating progress     Problem: Diabetes  Goal: Achieves glycemic balance  Description: Goal is to maintain blood sugar within range with no episodes of hypoglycemia  Outcome: Monitoring/Evaluating progress  Goal: Verbalizes/demonstrates understanding of NEW diagnosis of diabetes and management  Description: Document on Patient Education Activity  Outcome: Monitoring/Evaluating progress  Goal: Verbalizes understanding of diabetes management including how to use HbA1C to evaluate status of blood sugar over time (Diabetes is NOT a new diagnosis)  Description: Diabetes Education  Outcome: Monitoring/Evaluating progress  Goal: Demonstrates ability to self-administer insulin  Description: Document on Patient Education Activity  Outcome: Monitoring/Evaluating progress     Problem: Pain  Goal: Acceptable pain level achieved/maintained at rest using appropriate pain scale for the patient  Outcome: Monitoring/Evaluating progress  Goal: Acceptable pain level achieved/maintained with activity using appropriate pain scale for the patient  Outcome: Monitoring/Evaluating progress  Goal: Acceptable pain level achieved/maintained without oversedation  Outcome: Monitoring/Evaluating progress      Intact

## 2025-05-28 NOTE — ED PEDIATRIC NURSE NOTE - BREATHING, MLM
If you get worsening fever or chills  - proceed to the ER -   And if the redness is traveling up the leg - proceed to ER     Start the antibiotic asap     EDUCATION ABOUT TAKING ANTIBIOTICS:     It is very important to complete the entire course of antibiotics as directed.  This helps prevent antibiotic resistant forms of bacteria.     You may want to create a chart, and yun off the doses taken to remember them all.     Common side effects of antibiotics include yeast infections, diarrhea and nausea. Sometimes over-the-counter probiotics (such as eating yogurt or taking acidophilus or Culturelle)  may help prevent the diarrhea and yeast infections caused by antibiotics. If you develop persistent or bloody diarrhea after taking an antibiotic, please contact your provider about the possibility of a serious secondary infection of your colon caused by the antibiotic. Sometimes the nausea from antibiotics can be helped by taking the antibiotic with food unless otherwise specified not to by the pharmacist.     If you develop a rash while on the antibiotic, if could be from the antibiotic, from the illness itself, or could be from a response by some viral infections to the antibiotic. Please discuss this with your provider before assuming that you are allergic to the medication.    If it is determined that you have had an allergic reaction to the antibiotic, please make sure you make note of that for yourself to be sure to never get that antibiotic again as a more serious reaction - called anaphylaxis - may occur.   You should also ask about similar antibiotics that may be dangerous as well.    If you are a woman on birth control, it is important you use a back up form of contraception for the next month to prevent pregnancy as some antibiotics reduce the effectiveness of birth control. This could result in an unplanned pregnancy.   
Spontaneous, unlabored and symmetrical
show

## 2025-07-23 NOTE — ED PROVIDER NOTE - INTERNATIONAL TRAVEL
No Consent: The risks of atrophy were reviewed with the patient. How Many Mls Were Removed From The 40 Mg/Ml (1ml) Vial When Preparing The Injectable Solution?: 0 Kenalog Type Of Vial: Multiple Dose Expiration Date For Kenalog (Optional): Sep 2027 Include Z78.9 (Other Specified Conditions Influencing Health Status) As An Associated Diagnosis?: No Concentration Of Kenalog Solution Injected (Mg/Ml): 2.5 Show Inventory Tab: Hide Ndc# For Kenalog Only: 7137-3556-98-7 Kenalog Preparation: Kenalog Total Volume (Ccs): 0.4 Validate Note Data When Using Inventory: Yes Medical Necessity Clause: This procedure was medically necessary because the lesions that were treated were: Lot # For Kenalog (Optional): [id number] Detail Level: Detailed Administered By (Optional): DOUGLAS Torres

## 2025-08-11 ENCOUNTER — EMERGENCY (EMERGENCY)
Facility: HOSPITAL | Age: 25
LOS: 0 days | Discharge: HOME | End: 2025-08-11
Attending: EMERGENCY MEDICINE
Payer: COMMERCIAL

## 2025-08-11 VITALS
HEART RATE: 86 BPM | RESPIRATION RATE: 18 BRPM | DIASTOLIC BLOOD PRESSURE: 86 MMHG | TEMPERATURE: 98 F | OXYGEN SATURATION: 98 % | WEIGHT: 315 LBS | SYSTOLIC BLOOD PRESSURE: 120 MMHG

## 2025-08-11 DIAGNOSIS — I10 ESSENTIAL (PRIMARY) HYPERTENSION: ICD-10-CM

## 2025-08-11 DIAGNOSIS — M54.89 OTHER DORSALGIA: ICD-10-CM

## 2025-08-11 DIAGNOSIS — Z98.1 ARTHRODESIS STATUS: Chronic | ICD-10-CM

## 2025-08-11 DIAGNOSIS — M25.511 PAIN IN RIGHT SHOULDER: ICD-10-CM

## 2025-08-11 DIAGNOSIS — Z88.5 ALLERGY STATUS TO NARCOTIC AGENT: ICD-10-CM

## 2025-08-11 DIAGNOSIS — M54.2 CERVICALGIA: ICD-10-CM

## 2025-08-11 DIAGNOSIS — Z98.890 OTHER SPECIFIED POSTPROCEDURAL STATES: Chronic | ICD-10-CM

## 2025-08-11 DIAGNOSIS — Z98.890 OTHER SPECIFIED POSTPROCEDURAL STATES: ICD-10-CM

## 2025-08-11 DIAGNOSIS — Z90.89 ACQUIRED ABSENCE OF OTHER ORGANS: Chronic | ICD-10-CM

## 2025-08-11 DIAGNOSIS — S29.012A STRAIN OF MUSCLE AND TENDON OF BACK WALL OF THORAX, INITIAL ENCOUNTER: ICD-10-CM
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
## 2025-08-11 PROBLEM — Z86.79 PERSONAL HISTORY OF OTHER DISEASES OF THE CIRCULATORY SYSTEM: Chronic | Status: ACTIVE | Noted: 2024-08-03

## 2025-08-11 PROBLEM — G03.9 MENINGITIS, UNSPECIFIED: Chronic | Status: ACTIVE | Noted: 2024-08-03

## 2025-08-11 PROCEDURE — 99284 EMERGENCY DEPT VISIT MOD MDM: CPT

## 2025-08-11 PROCEDURE — 96372 THER/PROPH/DIAG INJ SC/IM: CPT

## 2025-08-11 PROCEDURE — 99283 EMERGENCY DEPT VISIT LOW MDM: CPT | Mod: 25

## 2025-08-11 RX ORDER — KETOROLAC TROMETHAMINE 30 MG/ML
60 INJECTION, SOLUTION INTRAMUSCULAR; INTRAVENOUS ONCE
Refills: 0 | Status: DISCONTINUED | OUTPATIENT
Start: 2025-08-11 | End: 2025-08-11

## 2025-08-11 RX ORDER — METHOCARBAMOL 500 MG/1
2 TABLET, FILM COATED ORAL
Qty: 18 | Refills: 0
Start: 2025-08-11 | End: 2025-08-13

## 2025-08-11 RX ORDER — LIDOCAINE HYDROCHLORIDE 20 MG/ML
1 JELLY TOPICAL
Qty: 1 | Refills: 0
Start: 2025-08-11

## 2025-08-11 RX ORDER — METHOCARBAMOL 500 MG/1
1500 TABLET, FILM COATED ORAL ONCE
Refills: 0 | Status: COMPLETED | OUTPATIENT
Start: 2025-08-11 | End: 2025-08-11

## 2025-08-11 RX ORDER — LIDOCAINE HYDROCHLORIDE 20 MG/ML
1 JELLY TOPICAL ONCE
Refills: 0 | Status: COMPLETED | OUTPATIENT
Start: 2025-08-11 | End: 2025-08-11

## 2025-08-11 RX ADMIN — LIDOCAINE HYDROCHLORIDE 1 PATCH: 20 JELLY TOPICAL at 09:23

## 2025-08-11 RX ADMIN — KETOROLAC TROMETHAMINE 60 MILLIGRAM(S): 30 INJECTION, SOLUTION INTRAMUSCULAR; INTRAVENOUS at 09:23

## 2025-08-11 RX ADMIN — METHOCARBAMOL 1500 MILLIGRAM(S): 500 TABLET, FILM COATED ORAL at 09:23
